# Patient Record
Sex: MALE | Race: WHITE | Employment: OTHER | URBAN - NONMETROPOLITAN AREA
[De-identification: names, ages, dates, MRNs, and addresses within clinical notes are randomized per-mention and may not be internally consistent; named-entity substitution may affect disease eponyms.]

---

## 2018-12-21 ENCOUNTER — HOSPITAL ENCOUNTER (OUTPATIENT)
Dept: CT IMAGING | Age: 63
Discharge: HOME | End: 2018-12-21
Payer: COMMERCIAL

## 2018-12-21 DIAGNOSIS — F17.209 NICOTINE DEPENDENCE WITH NICOTINE-INDUCED DISORDER, UNSPECIFIED NICOTINE PRODUCT TYPE: ICD-10-CM

## 2018-12-21 PROCEDURE — G0297 LDCT FOR LUNG CA SCREEN: HCPCS

## 2019-12-30 ENCOUNTER — HOSPITAL ENCOUNTER (OUTPATIENT)
Dept: CT IMAGING | Age: 64
Discharge: HOME | End: 2019-12-30
Payer: COMMERCIAL

## 2019-12-30 DIAGNOSIS — Z87.891 PERSONAL HISTORY OF TOBACCO USE, PRESENTING HAZARDS TO HEALTH: ICD-10-CM

## 2019-12-30 PROCEDURE — G0297 LDCT FOR LUNG CA SCREEN: HCPCS

## 2020-07-24 ENCOUNTER — HOSPITAL ENCOUNTER (OUTPATIENT)
Dept: AUDIOLOGY | Age: 65
Discharge: HOME | End: 2020-07-24
Payer: COMMERCIAL

## 2020-07-24 PROCEDURE — 92557 COMPREHENSIVE HEARING TEST: CPT | Performed by: AUDIOLOGIST

## 2020-07-24 PROCEDURE — 92567 TYMPANOMETRY: CPT | Performed by: AUDIOLOGIST

## 2020-07-24 NOTE — LETTER
[address] [facility]  [address] [place] [address] [address]  Phone: [address], Au[place]        July 24, 2020     Bekah Pennington, [address]    Patient: Piyush Garcia   MR Number: [id number]   YOB: 1955   Date of Visit: 7/24/2020       Dear Dr. Tete Casas:    Thank you for referring Angelito Flores to me for evaluation. Below are the relevant portions of my assessment and plan of care. AUDIOLOGICAL EVALUATION      REASON FOR TESTING:  The patient stated that he has a known bilateral high frequency hearing loss that was identified with occupational audiology testing. He is noticing increasing hearing difficulties during the last several years. Hearing in groups and background noise is difficult. He denies tinnitus, otalgia and dizziness. OTOSCOPY: Clear canal, bilaterally. AUDIOGRAM    Reliability: Good  Audiometer Used:  [id number]        COMMENTS: Normal hearing sloping to a slight to mild mostly high frequency sensorineural hearing loss, bilaterally. Good word recognition ability in both ears. Tympanometry revealed normal middle ear function in both ears. RECOMMENDATION(S): Bilateral hearing aids are recommended. Annual hearing testing is recommended to monitor hearing levels. The patient stated that he will consider scheduling an appointment for a hearing aid evaluation. If you have questions, please do not hesitate to call me. I look forward to following Samuel Garcia along with you.     Sincerely,          Betsy Stein, AuD

## 2020-07-24 NOTE — PROGRESS NOTES
AUDIOLOGICAL EVALUATION      REASON FOR TESTING:  The patient stated that he has a known bilateral high frequency hearing loss that was identified with occupational audiology testing. He is noticing increasing hearing difficulties during the last several years. Hearing in groups and background noise is difficult. He denies tinnitus, otalgia and dizziness. OTOSCOPY: Clear canal, bilaterally. AUDIOGRAM    Reliability: Good  Audiometer Used:  [id number]        COMMENTS: Normal hearing sloping to a slight to mild mostly high frequency sensorineural hearing loss, bilaterally. Good word recognition ability in both ears. Tympanometry revealed normal middle ear function in both ears. RECOMMENDATION(S): Bilateral hearing aids are recommended. Annual hearing testing is recommended to monitor hearing levels. The patient stated that he will consider scheduling an appointment for a hearing aid evaluation.

## 2020-09-15 ENCOUNTER — HOSPITAL ENCOUNTER (OUTPATIENT)
Dept: AUDIOLOGY | Age: 65
Discharge: HOME | End: 2020-09-15

## 2020-09-15 PROCEDURE — 9990000010 HC NO CHARGE VISIT: Performed by: AUDIOLOGIST

## 2020-09-15 NOTE — PROGRESS NOTES
New Hearing Aid Consultation:  Available hearing aid styles, technologies and options were discussed. The patient does have insurance coverage for hearing aids through [employer]. The patient is interested in pursuing Qwite hearing aids.   The patient's hearing aid fitting is scheduled for 10/14/2020

## 2020-09-25 ENCOUNTER — TELEPHONE (OUTPATIENT)
Dept: AUDIOLOGY | Age: 65
End: 2020-09-25

## 2020-09-25 NOTE — TELEPHONE ENCOUNTER
Patient needs to see an ENT for medical clearance for hearing aids as required by his insurance. Insurance will not cover this visit. I asked the patient to call us and we will help him schedule the appointment if needed. I will fit him with loaner hearing aids on the 14th if he cannot see a physician before than.

## 2020-10-07 ENCOUNTER — OFFICE VISIT (OUTPATIENT)
Dept: ENT CLINIC | Age: 65
End: 2020-10-07
Payer: COMMERCIAL

## 2020-10-07 VITALS
WEIGHT: 205.7 LBS | HEART RATE: 72 BPM | TEMPERATURE: 97.8 F | SYSTOLIC BLOOD PRESSURE: 112 MMHG | HEIGHT: 70 IN | BODY MASS INDEX: 29.45 KG/M2 | RESPIRATION RATE: 12 BRPM | DIASTOLIC BLOOD PRESSURE: 76 MMHG

## 2020-10-07 PROCEDURE — 99202 OFFICE O/P NEW SF 15 MIN: CPT | Performed by: OTOLARYNGOLOGY

## 2020-10-07 RX ORDER — FLUTICASONE PROPIONATE 50 MCG
SPRAY, SUSPENSION (ML) NASAL
COMMUNITY
Start: 2020-09-18

## 2020-10-07 RX ORDER — ALBUTEROL SULFATE 2.5 MG/3ML
2.5 SOLUTION RESPIRATORY (INHALATION) EVERY 6 HOURS PRN
COMMUNITY

## 2020-10-07 RX ORDER — DOXEPIN HYDROCHLORIDE 50 MG/1
50 CAPSULE ORAL NIGHTLY
COMMUNITY
End: 2022-01-10

## 2020-10-07 RX ORDER — NAPROXEN 500 MG/1
TABLET ORAL
COMMUNITY
Start: 2020-09-18 | End: 2020-10-07

## 2020-10-07 NOTE — PROGRESS NOTES
negative, except as noted in HPI. Objective:     /76 (Site: Left Upper Arm, Position: Sitting)   Pulse 72   Temp 97.8 °F (36.6 °C) (Infrared)   Resp 12   Ht 5' 10\" (1.778 m)   Wt 205 lb 11.2 oz (93.3 kg)   BMI 29.51 kg/m²     Physical Exam       On general physical exam the patient is a pleasant alert well oriented and cooperative adult male who appears approximately his stated age. His voice and speech pattern are both within normal limits for his age and gender. I heard no throat clearing coughing or inspiratory stridor. On otoscopy both the ears were within normal limits from his middle ear structures through his tympanic membranes, ear canals, and pinna. His extraocular movements were intact. Smooth pursuit and lateral gaze nystagmus were both within normal limits. Vitals reviewed. No results found. No results found for: NA, K, CL, CO2, BUN, CREATININE, CALCIUM, PROT, LABALBU, BILITOT, ALKPHOS, AST, ALT    All of the past medical history, past surgical history, family history,social history, allergies and current medications were reviewed with the patient. Assessment & Plan   Diagnoses and all orders for this visit:     Diagnosis Orders   1. Bilateral high frequency sensorineural hearing loss           The patient has a symmetrical high-frequency sensorineural hearing loss likely associated with age and loud noise exposure. He has no medical reason contraindicating amplification. He has no signs of retrocochlear pathology evident in his exam or in his audiologic evaluation. He is therefore cleared for hearing aids. I explained all this in detail to the patient to his satisfaction. I encouraged him to consider getting the hearing aids that are adjustable through his smart phone to increase the value of the technology and its utility under many noise environment circumstances. He will consider it. I will see the patient on an as-needed basis in the future.     I spent approximately 20 minutes of face-to-face time with the patient. Return if symptoms worsen or fail to improve; or new problems. **This report has been created using voice recognition software. It may contain minor errors which are inherent in voice recognition technology. **

## 2020-10-14 ENCOUNTER — HOSPITAL ENCOUNTER (OUTPATIENT)
Dept: AUDIOLOGY | Age: 65
Discharge: HOME | End: 2020-10-14
Payer: COMMERCIAL

## 2020-10-14 PROCEDURE — V5160 DISPENSING FEE BINAURAL: HCPCS | Performed by: AUDIOLOGIST

## 2020-10-14 PROCEDURE — V5261 HEARING AID, DIGIT, BIN, BTE: HCPCS | Performed by: AUDIOLOGIST

## 2020-10-14 NOTE — PROGRESS NOTES
[facility]#: [id number]    ACCT#: [de-identified]    DIAGNOSIS: H90.3    NEW HEARING AID FITTING: Dispensed Phonak P70-R JESSIE hearing aid(s) for the right and left ear(s). Explained care, use and insertion/removal.  Programmed. Hearing aids were paired to the patient's phone and TV streamer. AIDED TESTING:  Real ear to  measures were obtained during today's appointment. The [employer] Real Ear system was used to perform the RECD measures. The hearing aid was reprogrammed to match appropriate DSL targets for MPO, soft, medium and loud stimuli with speech mapping based on 07/24/2020 audiological data. The measures were as follows. Aided responses suggest: good audibility from appropriately fit amplification   Hearing aid fitting  recheck scheduled for 11/04/2020.

## 2020-10-22 ENCOUNTER — HOSPITAL ENCOUNTER (INPATIENT)
Age: 65
LOS: 1 days | Discharge: HOME | DRG: 247 | End: 2020-10-23
Attending: EMERGENCY MEDICINE | Admitting: INTERNAL MEDICINE
Payer: COMMERCIAL

## 2020-10-22 ENCOUNTER — APPOINTMENT (OUTPATIENT)
Dept: CARDIAC CATH/INVASIVE PROCEDURES | Age: 65
DRG: 247 | End: 2020-10-22
Payer: COMMERCIAL

## 2020-10-22 PROBLEM — I21.3 STEMI (ST ELEVATION MYOCARDIAL INFARCTION) (HCC): Status: ACTIVE | Noted: 2020-10-22

## 2020-10-22 LAB
ACTIVATED CLOTTING TIME: 241 SECONDS (ref 1–150)
ANION GAP SERPL CALCULATED.3IONS-SCNC: 14 MEQ/L (ref 8–16)
BASOPHILS # BLD: 0.3 %
BASOPHILS ABSOLUTE: 0 THOU/MM3 (ref 0–0.1)
BUN BLDV-MCNC: 21 MG/DL (ref 7–22)
CALCIUM SERPL-MCNC: 10.6 MG/DL (ref 8.5–10.5)
CHLORIDE BLD-SCNC: 105 MEQ/L (ref 98–111)
CO2: 24 MEQ/L (ref 23–33)
CREAT SERPL-MCNC: 1 MG/DL (ref 0.4–1.2)
EKG ATRIAL RATE: 62 BPM
EKG P AXIS: 64 DEGREES
EKG P-R INTERVAL: 142 MS
EKG Q-T INTERVAL: 394 MS
EKG QRS DURATION: 92 MS
EKG QTC CALCULATION (BAZETT): 399 MS
EKG R AXIS: 78 DEGREES
EKG T AXIS: 79 DEGREES
EKG VENTRICULAR RATE: 62 BPM
EOSINOPHIL # BLD: 1 %
EOSINOPHILS ABSOLUTE: 0.1 THOU/MM3 (ref 0–0.4)
ERYTHROCYTE [DISTWIDTH] IN BLOOD BY AUTOMATED COUNT: 13.4 % (ref 11.5–14.5)
ERYTHROCYTE [DISTWIDTH] IN BLOOD BY AUTOMATED COUNT: 48 FL (ref 35–45)
GFR SERPL CREATININE-BSD FRML MDRD: 75 ML/MIN/1.73M2
GLUCOSE BLD-MCNC: 119 MG/DL (ref 70–108)
HCT VFR BLD CALC: 45.7 % (ref 42–52)
HEMOGLOBIN: 15.7 GM/DL (ref 14–18)
IMMATURE GRANS (ABS): 0.03 THOU/MM3 (ref 0–0.07)
IMMATURE GRANULOCYTES: 0.3 %
LYMPHOCYTES # BLD: 35 %
LYMPHOCYTES ABSOLUTE: 3.6 THOU/MM3 (ref 1–4.8)
MCH RBC QN AUTO: 33.3 PG (ref 26–33)
MCHC RBC AUTO-ENTMCNC: 34.4 GM/DL (ref 32.2–35.5)
MCV RBC AUTO: 97 FL (ref 80–94)
MONOCYTES # BLD: 7.8 %
MONOCYTES ABSOLUTE: 0.8 THOU/MM3 (ref 0.4–1.3)
MRSA SCREEN RT-PCR: NEGATIVE
NUCLEATED RED BLOOD CELLS: 0 /100 WBC
OSMOLALITY CALCULATION: 289.1 MOSMOL/KG (ref 275–300)
PLATELET # BLD: 210 THOU/MM3 (ref 130–400)
PMV BLD AUTO: 10.1 FL (ref 9.4–12.4)
POTASSIUM REFLEX MAGNESIUM: 3.9 MEQ/L (ref 3.5–5.2)
PRO-BNP: 52.3 PG/ML (ref 0–900)
RBC # BLD: 4.71 MILL/MM3 (ref 4.7–6.1)
SEG NEUTROPHILS: 55.6 %
SEGMENTED NEUTROPHILS ABSOLUTE COUNT: 5.7 THOU/MM3 (ref 1.8–7.7)
SODIUM BLD-SCNC: 143 MEQ/L (ref 135–145)
TROPONIN T: < 0.01 NG/ML
VANCOMYCIN RESISTANT ENTEROCOCCUS: NEGATIVE
WBC # BLD: 10.3 THOU/MM3 (ref 4.8–10.8)

## 2020-10-22 PROCEDURE — B2111ZZ FLUOROSCOPY OF MULTIPLE CORONARY ARTERIES USING LOW OSMOLAR CONTRAST: ICD-10-PCS | Performed by: INTERNAL MEDICINE

## 2020-10-22 PROCEDURE — 87500 VANOMYCIN DNA AMP PROBE: CPT

## 2020-10-22 PROCEDURE — 6360000002 HC RX W HCPCS

## 2020-10-22 PROCEDURE — 93458 L HRT ARTERY/VENTRICLE ANGIO: CPT | Performed by: INTERNAL MEDICINE

## 2020-10-22 PROCEDURE — 85347 COAGULATION TIME ACTIVATED: CPT

## 2020-10-22 PROCEDURE — 6360000004 HC RX CONTRAST MEDICATION: Performed by: INTERNAL MEDICINE

## 2020-10-22 PROCEDURE — 92941 PRQ TRLML REVSC TOT OCCL AMI: CPT | Performed by: INTERNAL MEDICINE

## 2020-10-22 PROCEDURE — 36415 COLL VENOUS BLD VENIPUNCTURE: CPT

## 2020-10-22 PROCEDURE — 96374 THER/PROPH/DIAG INJ IV PUSH: CPT

## 2020-10-22 PROCEDURE — 6360000002 HC RX W HCPCS: Performed by: INTERNAL MEDICINE

## 2020-10-22 PROCEDURE — 87641 MR-STAPH DNA AMP PROBE: CPT

## 2020-10-22 PROCEDURE — C1769 GUIDE WIRE: HCPCS

## 2020-10-22 PROCEDURE — 93005 ELECTROCARDIOGRAM TRACING: CPT | Performed by: EMERGENCY MEDICINE

## 2020-10-22 PROCEDURE — 83880 ASSAY OF NATRIURETIC PEPTIDE: CPT

## 2020-10-22 PROCEDURE — 2709999900 HC NON-CHARGEABLE SUPPLY

## 2020-10-22 PROCEDURE — 99284 EMERGENCY DEPT VISIT MOD MDM: CPT

## 2020-10-22 PROCEDURE — 93010 ELECTROCARDIOGRAM REPORT: CPT | Performed by: NUCLEAR MEDICINE

## 2020-10-22 PROCEDURE — C1887 CATHETER, GUIDING: HCPCS

## 2020-10-22 PROCEDURE — 2580000003 HC RX 258: Performed by: INTERNAL MEDICINE

## 2020-10-22 PROCEDURE — C1894 INTRO/SHEATH, NON-LASER: HCPCS

## 2020-10-22 PROCEDURE — 2000000000 HC ICU R&B

## 2020-10-22 PROCEDURE — 4A023N7 MEASUREMENT OF CARDIAC SAMPLING AND PRESSURE, LEFT HEART, PERCUTANEOUS APPROACH: ICD-10-PCS | Performed by: INTERNAL MEDICINE

## 2020-10-22 PROCEDURE — B2151ZZ FLUOROSCOPY OF LEFT HEART USING LOW OSMOLAR CONTRAST: ICD-10-PCS | Performed by: INTERNAL MEDICINE

## 2020-10-22 PROCEDURE — C1874 STENT, COATED/COV W/DEL SYS: HCPCS

## 2020-10-22 PROCEDURE — 027034Z DILATION OF CORONARY ARTERY, ONE ARTERY WITH DRUG-ELUTING INTRALUMINAL DEVICE, PERCUTANEOUS APPROACH: ICD-10-PCS | Performed by: INTERNAL MEDICINE

## 2020-10-22 PROCEDURE — 6370000000 HC RX 637 (ALT 250 FOR IP)

## 2020-10-22 PROCEDURE — C1725 CATH, TRANSLUMIN NON-LASER: HCPCS

## 2020-10-22 PROCEDURE — 99223 1ST HOSP IP/OBS HIGH 75: CPT | Performed by: INTERNAL MEDICINE

## 2020-10-22 PROCEDURE — 85025 COMPLETE CBC W/AUTO DIFF WBC: CPT

## 2020-10-22 PROCEDURE — 87147 CULTURE TYPE IMMUNOLOGIC: CPT

## 2020-10-22 PROCEDURE — 87081 CULTURE SCREEN ONLY: CPT

## 2020-10-22 PROCEDURE — 6370000000 HC RX 637 (ALT 250 FOR IP): Performed by: INTERNAL MEDICINE

## 2020-10-22 PROCEDURE — 80048 BASIC METABOLIC PNL TOTAL CA: CPT

## 2020-10-22 PROCEDURE — 2500000003 HC RX 250 WO HCPCS

## 2020-10-22 PROCEDURE — 84484 ASSAY OF TROPONIN QUANT: CPT

## 2020-10-22 RX ORDER — HEPARIN SODIUM 1000 [USP'U]/ML
5000 INJECTION, SOLUTION INTRAVENOUS; SUBCUTANEOUS ONCE
Status: COMPLETED | OUTPATIENT
Start: 2020-10-22 | End: 2020-10-22

## 2020-10-22 RX ORDER — ASPIRIN 81 MG/1
81 TABLET ORAL DAILY
Status: DISCONTINUED | OUTPATIENT
Start: 2020-10-23 | End: 2020-10-23 | Stop reason: SDUPTHER

## 2020-10-22 RX ORDER — CARVEDILOL 3.12 MG/1
3.12 TABLET ORAL 2 TIMES DAILY WITH MEALS
Status: DISCONTINUED | OUTPATIENT
Start: 2020-10-22 | End: 2020-10-23 | Stop reason: HOSPADM

## 2020-10-22 RX ORDER — ONDANSETRON 2 MG/ML
4 INJECTION INTRAMUSCULAR; INTRAVENOUS EVERY 6 HOURS PRN
Status: DISCONTINUED | OUTPATIENT
Start: 2020-10-22 | End: 2020-10-23 | Stop reason: HOSPADM

## 2020-10-22 RX ORDER — ACETAMINOPHEN 325 MG/1
650 TABLET ORAL EVERY 4 HOURS PRN
Status: DISCONTINUED | OUTPATIENT
Start: 2020-10-22 | End: 2020-10-23 | Stop reason: HOSPADM

## 2020-10-22 RX ORDER — SODIUM CHLORIDE 0.9 % (FLUSH) 0.9 %
10 SYRINGE (ML) INJECTION EVERY 12 HOURS SCHEDULED
Status: DISCONTINUED | OUTPATIENT
Start: 2020-10-22 | End: 2020-10-23 | Stop reason: HOSPADM

## 2020-10-22 RX ORDER — ATORVASTATIN CALCIUM 80 MG/1
80 TABLET, FILM COATED ORAL NIGHTLY
Status: DISCONTINUED | OUTPATIENT
Start: 2020-10-22 | End: 2020-10-23 | Stop reason: HOSPADM

## 2020-10-22 RX ORDER — SODIUM CHLORIDE 0.9 % (FLUSH) 0.9 %
10 SYRINGE (ML) INJECTION PRN
Status: DISCONTINUED | OUTPATIENT
Start: 2020-10-22 | End: 2020-10-23 | Stop reason: HOSPADM

## 2020-10-22 RX ADMIN — HEPARIN SODIUM 5000 UNITS: 1000 INJECTION INTRAVENOUS; SUBCUTANEOUS at 15:08

## 2020-10-22 RX ADMIN — Medication 10 ML: at 20:17

## 2020-10-22 RX ADMIN — TICAGRELOR 90 MG: 90 TABLET ORAL at 20:15

## 2020-10-22 RX ADMIN — IOPAMIDOL 90 ML: 755 INJECTION, SOLUTION INTRAVENOUS at 15:57

## 2020-10-22 RX ADMIN — CARVEDILOL 3.12 MG: 3.12 TABLET, FILM COATED ORAL at 20:13

## 2020-10-22 ASSESSMENT — PAIN DESCRIPTION - PAIN TYPE: TYPE: ACUTE PAIN

## 2020-10-22 ASSESSMENT — ENCOUNTER SYMPTOMS
COLOR CHANGE: 0
PHOTOPHOBIA: 0
NAUSEA: 0
CHEST TIGHTNESS: 0
VOMITING: 0
ABDOMINAL PAIN: 0
BACK PAIN: 0
COUGH: 0
EYE REDNESS: 0
SINUS PRESSURE: 0
SORE THROAT: 0

## 2020-10-22 ASSESSMENT — PAIN DESCRIPTION - DESCRIPTORS: DESCRIPTORS: SHARP;SHOOTING

## 2020-10-22 ASSESSMENT — PAIN SCALES - GENERAL
PAINLEVEL_OUTOF10: 0
PAINLEVEL_OUTOF10: 8

## 2020-10-22 ASSESSMENT — PAIN DESCRIPTION - ORIENTATION: ORIENTATION: MID

## 2020-10-22 ASSESSMENT — PAIN DESCRIPTION - LOCATION: LOCATION: CHEST

## 2020-10-22 NOTE — ED PROVIDER NOTES
[facility] ENCOUNTER          Pt Name: Rajan Calix  MRN: [id number]  [place] 1955  Date of evaluation: 10/22/2020  Emergency Physician: Amaury Bolden, [facility]       Chief Complaint   Patient presents with    Chest Pain     History obtained from the patient. HISTORY OF PRESENT ILLNESS    HPI  Rajan Calix is a 63 y.o. male who presents to the emergency department for evaluation of chest pain. Patient with a past medical history of tobacco use. Denies history of hyperlipidemia hypertension or diabetes. No family history of coronary artery disease. No history of aneurysms or strokes. States chest pain started when he was pulling weeds outside today. Pain is midsternal.  It does not radiate  The patient has no other acute complaints at this time. REVIEW OF SYSTEMS   Review of Systems   Constitutional: Negative for activity change, chills and fever. HENT: Negative for congestion, sinus pressure and sore throat. Eyes: Negative for photophobia, redness and visual disturbance. Respiratory: Negative for cough and chest tightness. Cardiovascular: Positive for chest pain. Negative for palpitations and leg swelling. Gastrointestinal: Negative for abdominal pain, nausea and vomiting. Endocrine: Negative for polydipsia and polyuria. Genitourinary: Negative for decreased urine volume, difficulty urinating, dysuria, flank pain, frequency and urgency. Musculoskeletal: Negative for back pain, myalgias and neck pain. Skin: Negative for color change and rash. Neurological: Negative for weakness and headaches. Hematological: Negative for adenopathy. Does not bruise/bleed easily. Psychiatric/Behavioral: Negative for confusion and self-injury.          PAST MEDICAL AND SURGICAL HISTORY     Past Medical History:   Diagnosis Date    Hyperlipidemia      Past Surgical History:   Procedure Laterality Date  BACK SURGERY           MEDICATIONS   No current facility-administered medications for this encounter. Current Outpatient Medications:     albuterol (PROVENTIL) (2.5 MG/3ML) 0.083% nebulizer solution, Take 2.5 mg by nebulization every 6 hours as needed for Wheezing, Disp: , Rfl:     doxepin (SINEQUAN) 50 MG capsule, Take 50 mg by mouth nightly, Disp: , Rfl:     fluticasone (FLONASE) 50 MCG/ACT nasal spray, , Disp: , Rfl:     PROAIR  (90 Base) MCG/ACT inhaler, , Disp: , Rfl:     ADVAIR DISKUS 250-50 MCG/DOSE AEPB, , Disp: , Rfl:       SOCIAL HISTORY     Social History     Social History Narrative    Not on file     Social History     Tobacco Use    Smoking status: Passive Smoke Exposure - Never Smoker    Smokeless tobacco: Never Used   Substance Use Topics    Alcohol use: No    Drug use: Yes     Types: Marijuana     Comment: last used cannabis on 3/15/13         ALLERGIES   No Known Allergies      FAMILY HISTORY     Family History   Problem Relation Age of Onset    Heart Disease Maternal Grandmother     Heart Disease Maternal Grandfather     Cancer Maternal Grandfather     Heart Disease Paternal Grandmother     Substance Abuse Maternal Uncle          PHYSICAL EXAM     ED Triage Vitals [10/22/20 1507]   BP Temp Temp Source Pulse Resp SpO2 Height Weight   118/77 97.9 °F (36.6 °C) Oral 62 20 100 % -- 205 lb (93 kg)         Additional Vital Signs:  Vitals:    10/22/20 1507   BP: 118/77   Pulse: 62   Resp: 20   Temp: 97.9 °F (36.6 °C)   SpO2: 100%       Physical Exam  Vitals signs and nursing note reviewed. Constitutional:       General: He is in acute distress. Appearance: He is well-developed. He is not diaphoretic. HENT:      Head: Normocephalic. Eyes:      Pupils: Pupils are equal, round, and reactive to light. Neck:      Musculoskeletal: Normal range of motion and neck supple. Vascular: No JVD. Cardiovascular:      Rate and Rhythm: Normal rate and regular rhythm. Pulses: Normal pulses. Heart sounds: Normal heart sounds. No murmur. Comments: Equal radial pulses bilateral  Pulmonary:      Effort: Pulmonary effort is normal.      Breath sounds: Normal breath sounds. Abdominal:      General: Bowel sounds are normal. There is no distension. Palpations: Abdomen is soft. Tenderness: There is no abdominal tenderness. Musculoskeletal: Normal range of motion. General: No tenderness or deformity. Skin:     General: Skin is warm and dry. Capillary Refill: Capillary refill takes less than 2 seconds. Neurological:      Mental Status: He is alert and oriented to person, place, and time. GCS: GCS eye subscore is 4. GCS verbal subscore is 5. GCS motor subscore is 6. Cranial Nerves: No cranial nerve deficit. Sensory: No sensory deficit. Motor: No abnormal muscle tone. Initial vital signs and nursing assessment reviewed and normal. Pulsoximetry is normal per my interpretation. MEDICAL DECISION MAKING   Initial Assessment: Patient presented for evaluation of chest pain. The vitals signs and physical exam were notable for patient appears in discomfort. He has equal pulses bilateral.  No murmurs  Given these findings the emergent condition that needed addressed/further defined were ECG shows ST elevation MI. Given the patient ECG shows ST elevation MI, unlikely dissection, pulmonary embolus given history  Plan: To further define disposition and risk stratification will obtain CBC, BMP, ECG, troponin, activate Cath Lab.         ED RESULTS   Laboratory results:  Labs Reviewed   BASIC METABOLIC PANEL W/ REFLEX TO MG FOR LOW K   BRAIN NATRIURETIC PEPTIDE   CBC WITH AUTO DIFFERENTIAL   TROPONIN       Radiologic studies results:  No orders to display       ED Medications administered this visit:   Medications   heparin (porcine) injection 5,000 Units (5,000 Units Intravenous Given 10/22/20 5774)         ED COURSE      Dr. Yaneli Chowdhury

## 2020-10-22 NOTE — PLAN OF CARE
Problem: Discharge Planning:  Goal: Discharged to appropriate level of care  Description: Discharged to appropriate level of care  Outcome: Ongoing  Note: Discharge planning in process and discussed with patient/family. Social work consulted for any additional needs. Care manager aware of discharge needs. Problem: Pain - Acute:  Goal: Pain level will decrease  Description: Pain level will decrease  Outcome: Ongoing     Problem: Fluid Volume - Imbalance:  Goal: Will show no signs and symptoms of excessive bleeding  Description: Will show no signs and symptoms of excessive bleeding  Outcome: Ongoing  Note: No signs of excessive bleeding observed. Vascband remains in place, 4ml remain at the end of this shift. Continue to monitor for signs of bleeding and for fluid volume imbalance. Problem: Fluid Volume - Imbalance:  Goal: Absence of imbalanced fluid volume signs and symptoms  Description: Absence of imbalanced fluid volume signs and symptoms  Outcome: Ongoing     Problem: Anxiety:  Goal: Level of anxiety will decrease  Description: Level of anxiety will decrease  Outcome: Ongoing  Note: No anxiety observed. Continue to monitor. Problem: Cardiac Output - Decreased:  Goal: Cardiac output within specified parameters  Description: Cardiac output within specified parameters  Outcome: Ongoing  Note: CO adequate at this time. BP adequate, UOP adequate. Continue to monitor. Problem: Serum Glucose Level - Abnormal:  Goal: Ability to maintain appropriate glucose levels will improve  Description: Ability to maintain appropriate glucose levels will improve  Outcome: Ongoing  Note: Glucose levels within normal limits at bedside. Problem: Tissue Perfusion - Cardiopulmonary, Altered:  Goal: Absence of angina  Description: Absence of angina  Outcome: Ongoing  Note: No chest pain for this shift.       Problem: Tissue Perfusion - Cardiopulmonary, Altered:  Goal: Hemodynamic stability will improve  Description: Hemodynamic stability will improve  Outcome: Ongoing  Note: Remains hemodynamically stable for this shift. Problem: Tissue Perfusion - Peripheral, Altered:  Goal: Absence of hematoma at arterial access site  Description: Absence of hematoma at arterial access site  Outcome: Ongoing  Note: No hematoma at access site. Vascband remains in place. Removing air per protocol. Problem: Tissue Perfusion - Renal, Altered:  Goal: Electrolytes within specified parameters  Description: Electrolytes within specified parameters  Outcome: Ongoing  Note: No issues with electrolytes for this shift. Care plan reviewed with patient. Patient verbalizes understanding of the plan of care and contribute to goal setting.

## 2020-10-22 NOTE — H&P
[facility]'s  Cardiology Consult        Patient:  April Banegas  YOB: 1955  MRN: [id number]     Acct: [de-identified]    PCP: Beth Calles MD    Date of Admission: 10/22/2020      Reason for Consultation:  Chest pain/STEMI      History Of Present Illness:    63 y.o. pleasant male c hx of HLD who presented to the hospital with complaints of chest pain. The chest pain started about 2 hours prior to the arrival.  The pain was substernal, pressure like, associated with 10/10 pain, nonradiating, associated shortness of breath and diaphoresis, no aggravating or alleviating factors, no previous similar episodes. No prior cardiac work up. EKG in the field and in ED showed ST elevations in the inferior leads and ST depression in the anterior leads. Past Medical History:          Diagnosis Date    Hyperlipidemia        Past Surgical History:          Procedure Laterality Date    BACK SURGERY         Medications Prior to Admission:      Prior to Admission medications    Medication Sig Start Date End Date Taking? Authorizing Provider   albuterol (PROVENTIL) (2.5 MG/3ML) 0.083% nebulizer solution Take 2.5 mg by nebulization every 6 hours as needed for Wheezing    Historical Provider, MD   doxepin (SINEQUAN) 50 MG capsule Take 50 mg by mouth nightly    Historical Provider, MD   fluticasone [facility]) 50 MCG/ACT nasal spray  9/18/20   Historical Provider, MD   PROAIR  (30 Base) MCG/ACT inhaler  7/20/20   Historical Provider, MD Rosana Jones 250-50 MCG/DOSE AEPB  9/25/20   Historical Provider, MD       No current facility-administered medications for this encounter.       Current Outpatient Medications   Medication Sig Dispense Refill    albuterol (PROVENTIL) (2.5 MG/3ML) 0.083% nebulizer solution Take 2.5 mg by nebulization every 6 hours as needed for Wheezing      doxepin (SINEQUAN) 50 MG capsule Take 50 mg by mouth nightly      fluticasone (FLONASE) 50 MCG/ACT rashes, no suspicious skin lesions noted      LABS:    No results for input(s): CKTOTAL, CKMB, CKMBINDEX, TROPONINT in the last 72 hours. CBC: No results found for: WBC, RBC, HGB, HCT, MCV, MCH, MCHC, RDW, PLT, MPV  BMP:  No results found for: NA, K, CL, CO2, BUN, LABALBU, CREATININE, CALCIUM, GFRAA, LABGLOM, GLUCOSE  Hepatic Function Panel:  No results found for: ALKPHOS, ALT, AST, PROT, BILITOT, BILIDIR, IBILI, LABALBU  Magnesium:  No results found for: MG  Warfarin PT/INR:  No components found for: PTPATWAR, PTINRWAR  HgBA1c:  No results found for: LABA1C  FLP:  No results found for: TRIG, HDL, LDLCALC, LDLDIRECT, LABVLDL  TSH:  No results found for: TSH  BNP: No components found for: PRO-BNP      Assessment/Plan:    Patient Active Problem List   Diagnosis    Marijuana dependence ([facility] [address]     Chest pain/STEMI  HLD  Family hx of CAD    IV heparin  Emergent cath to evaluate coronaries  Aspirin  P2Y12 inhibitor  High intensity statin  Transfer to CCU/ICU post procedure  2D Echo  IV fluids  The indication, risks and benefits of the procedure and possible therapeutic consequences and alternatives were discussed with the patient. The patient was given the opportunity to ask questions and to have them answered to his/her satisfaction. Risks of the procedure include but are not limited to the following: Bleeding, hematoma including retroperitoneal hematoma, infection, pain and discomfort, injury to the aorta and other blood vessels, rhythm disturbance, low blood pressure, myocardial infarction, stroke, kidney damage/failure, myocardial perforation, allergic reactions to sedatives/contrast material, loss of pulse/vascular compromise requiring surgery, aneurysm/pseudoaneurysm formation, possible loss of a limb/hand/leg, death. Alternatives to and omission of the suggested procedure were discussed. The patient had no further questions and wished to proceed; the consent form was signed.   Discussed with daughter who was

## 2020-10-22 NOTE — PROCEDURES
[address] [place] [address]                            CARDIAC CATHETERIZATION    PATIENT NAME: Elizabeth Torre               :        1955  MED REC NO:   [id number]                           ROOM:       0016  ACCOUNT NO:   [de-identified]                           ADMIT DATE: 10/22/2020  PROVIDER:     Rick Campos MD    DATE OF PROCEDURE:  10/22/2020    PROCEDURES PERFORMED:  Left heart catheterization, LV gram, PCI of left  circumflex. INDICATION FOR STUDY:  STEMI. DESCRIPTION OF PROCEDURE:  After informed consent was obtained, the  patient was brought to the cardiac catheterization laboratory emergently  due to ongoing chest pain and EKG showing ST-segment elevation in the  inferolateral leads. Preprocedure timeout was performed and 2%  lidocaine was used to anesthetize the subcutaneous tissue overlying the  right radial artery. Using a modified Seldinger technique, a 6-Mosotho  Slender arterial sheath was placed in the right radial artery. Once the  sheath was in place, radial cocktail was given to reduce radial artery  spasm/occlusion. EQUIPMENTS USED:  Standard 6-Mosotho EBU 3.5 guide catheter, JL4 guide  catheter, pigtail catheter. ESTIMATED BLOOD LOSS:  Less than 20 mL. MEDICATIONS:  Please see EMR. CORONARY ANATOMY:  1. Right coronary artery appears to be a codominant system. There are  mild luminal irregularities in the proximal, mid, and distal portions of  the vessel. Distally, there is a moderate-size PDA and PL branches,  both of which are patent. 2.  Left main is patent and gives rise to LAD and left circumflex  arteries. 3.  Left circumflex artery is patent in the proximal portion beyond the  OM1 vessel. There is total occlusion thrombotic of the left circumflex  artery. 4.   LAD has a mildly calcified proximal to mid stenosis of about 70% to  80% followed by mild luminal irregularities at the distal portion of the  LAD. Based on angiographic findings, we decided to pursue percutaneous  coronary intervention of the left circumflex artery. IV heparin was  given. ACT was maintained above 250 seconds. Due to the extensive  nature of the clot, IV Aggrastat bolus was also given. Coronary  Runthrough wire was used to cross the critical stenosis in the left  circumflex artery. Tip of the wire was placed in the distal OM vessel. We first predilated the lesion with a 2.5 x 12 mm compliant balloon. After adequate predilatation, we then placed a 3.5 x 23 mm Xience Asmita  drug-eluting stent from normal-to-normal segment fashion. Once the  stent was deployed, it was postdilated at high pressures with a 4.0 x 20  mm noncompliant balloon at high pressures to ensure good stent  apposition. Repeat angiogram showed good CRISTOBAL-3 flow throughout the entire main  vessel as well as the side branches. There was no dissection,  perforation, distal embolizations, or side branch closures anywhere. There was good postdilatation of the stent. LVEDP was measured to be 11 mmHg. There is no obvious gradient across  the aortic valve and LV systolic function was estimated at 40% to 45%  with inferior apical hypokinesis. Hemostasis of the right radial artery was achieved with a Vasc Band  device. He was transferred to ICU for further monitoring. SUMMARY:  Successful PCI of totally occluded thrombotic left circumflex  artery with Xience Asmita drug-eluting stent. RECOMMENDATIONS:  1. DAPT with aspirin plus Brilinta for one year. 2.  Lipid-lowering therapy. 3.  Get 2D echocardiogram.  4.  IV fluids. 5.  Monitor radial artery access site for bleeding. 6.  Monitor for arrhythmias. 7.  Needs staged intervention of the proximal to mid LAD in a.m. All procedure details and management plans were discussed with the  patient and family members. They were in agreement with plan.         [facility]

## 2020-10-23 ENCOUNTER — TELEPHONE (OUTPATIENT)
Dept: CARDIOLOGY CLINIC | Age: 65
End: 2020-10-23

## 2020-10-23 ENCOUNTER — APPOINTMENT (OUTPATIENT)
Dept: CARDIAC CATH/INVASIVE PROCEDURES | Age: 65
DRG: 247 | End: 2020-10-23
Payer: COMMERCIAL

## 2020-10-23 VITALS
BODY MASS INDEX: 28.41 KG/M2 | SYSTOLIC BLOOD PRESSURE: 126 MMHG | WEIGHT: 197.97 LBS | RESPIRATION RATE: 18 BRPM | HEART RATE: 62 BPM | OXYGEN SATURATION: 95 % | DIASTOLIC BLOOD PRESSURE: 79 MMHG | TEMPERATURE: 98 F

## 2020-10-23 LAB
ACTIVATED CLOTTING TIME: 285 SECONDS (ref 1–150)
ACTIVATED CLOTTING TIME: 450 SECONDS (ref 1–150)
CHOLESTEROL, TOTAL: 200 MG/DL (ref 100–199)
HDLC SERPL-MCNC: 38 MG/DL
LDL CHOLESTEROL CALCULATED: 125 MG/DL
LV EF: 43 %
LVEF MODALITY: NORMAL
TRIGL SERPL-MCNC: 184 MG/DL (ref 0–199)

## 2020-10-23 PROCEDURE — 85347 COAGULATION TIME ACTIVATED: CPT

## 2020-10-23 PROCEDURE — C1769 GUIDE WIRE: HCPCS

## 2020-10-23 PROCEDURE — 6360000004 HC RX CONTRAST MEDICATION: Performed by: INTERNAL MEDICINE

## 2020-10-23 PROCEDURE — 99238 HOSP IP/OBS DSCHRG MGMT 30/<: CPT | Performed by: NURSE PRACTITIONER

## 2020-10-23 PROCEDURE — 6370000000 HC RX 637 (ALT 250 FOR IP): Performed by: INTERNAL MEDICINE

## 2020-10-23 PROCEDURE — 2500000003 HC RX 250 WO HCPCS

## 2020-10-23 PROCEDURE — 6370000000 HC RX 637 (ALT 250 FOR IP): Performed by: NURSE PRACTITIONER

## 2020-10-23 PROCEDURE — 80061 LIPID PANEL: CPT

## 2020-10-23 PROCEDURE — C1887 CATHETER, GUIDING: HCPCS

## 2020-10-23 PROCEDURE — 36415 COLL VENOUS BLD VENIPUNCTURE: CPT

## 2020-10-23 PROCEDURE — 92928 PRQ TCAT PLMT NTRAC ST 1 LES: CPT | Performed by: INTERNAL MEDICINE

## 2020-10-23 PROCEDURE — 93306 TTE W/DOPPLER COMPLETE: CPT

## 2020-10-23 PROCEDURE — C1874 STENT, COATED/COV W/DEL SYS: HCPCS

## 2020-10-23 PROCEDURE — 2709999900 HC NON-CHARGEABLE SUPPLY

## 2020-10-23 PROCEDURE — C1725 CATH, TRANSLUMIN NON-LASER: HCPCS

## 2020-10-23 PROCEDURE — 6360000002 HC RX W HCPCS

## 2020-10-23 PROCEDURE — C1894 INTRO/SHEATH, NON-LASER: HCPCS

## 2020-10-23 RX ORDER — ASPIRIN 81 MG/1
81 TABLET ORAL DAILY
Qty: 30 TABLET | Refills: 3 | Status: SHIPPED | OUTPATIENT
Start: 2020-10-24 | End: 2021-02-25

## 2020-10-23 RX ORDER — LISINOPRIL 5 MG/1
5 TABLET ORAL DAILY
Qty: 30 TABLET | Refills: 3 | Status: SHIPPED | OUTPATIENT
Start: 2020-10-24 | End: 2021-02-25

## 2020-10-23 RX ORDER — ASPIRIN 81 MG/1
81 TABLET ORAL DAILY
Status: DISCONTINUED | OUTPATIENT
Start: 2020-10-23 | End: 2020-10-23 | Stop reason: HOSPADM

## 2020-10-23 RX ORDER — ATORVASTATIN CALCIUM 80 MG/1
80 TABLET, FILM COATED ORAL NIGHTLY
Qty: 30 TABLET | Refills: 3 | Status: SHIPPED | OUTPATIENT
Start: 2020-10-23 | End: 2021-02-25

## 2020-10-23 RX ORDER — CARVEDILOL 3.12 MG/1
3.12 TABLET ORAL 2 TIMES DAILY WITH MEALS
Qty: 60 TABLET | Refills: 3 | Status: SHIPPED | OUTPATIENT
Start: 2020-10-23 | End: 2021-02-25

## 2020-10-23 RX ORDER — NITROGLYCERIN 0.4 MG/1
TABLET SUBLINGUAL
Qty: 25 TABLET | Refills: 1 | Status: SHIPPED | OUTPATIENT
Start: 2020-10-23

## 2020-10-23 RX ORDER — LISINOPRIL 5 MG/1
5 TABLET ORAL DAILY
Status: DISCONTINUED | OUTPATIENT
Start: 2020-10-23 | End: 2020-10-23 | Stop reason: HOSPADM

## 2020-10-23 RX ADMIN — CARVEDILOL 3.12 MG: 3.12 TABLET, FILM COATED ORAL at 10:29

## 2020-10-23 RX ADMIN — ATORVASTATIN CALCIUM 80 MG: 80 TABLET, FILM COATED ORAL at 00:21

## 2020-10-23 RX ADMIN — LISINOPRIL 5 MG: 5 TABLET ORAL at 13:09

## 2020-10-23 RX ADMIN — IOPAMIDOL 60 ML: 755 INJECTION, SOLUTION INTRAVENOUS at 07:41

## 2020-10-23 RX ADMIN — ASPIRIN 81 MG: 81 TABLET ORAL at 06:25

## 2020-10-23 RX ADMIN — TICAGRELOR 90 MG: 90 TABLET ORAL at 06:25

## 2020-10-23 ASSESSMENT — PAIN SCALES - GENERAL
PAINLEVEL_OUTOF10: 0
PAINLEVEL_OUTOF10: 0

## 2020-10-23 NOTE — FLOWSHEET NOTE
[facility] PROGRESS NOTE      Patient: Kira Hampton  Room #: 4F-20/346-N            YOB: 1955  Age: 63 y.o. Gender: male            Admit Date & Time: 10/22/2020  3:02 PM    Assessment:  Nilda Elder is a 63year old male who is in bed on 3B. His wife was a former nurse and once she saw me in the hallway she told me of her 's condition. Nilda Elder had his children in the room with him. He is in good spirits but wants to go home soon  Interventions:  Prayer with his family    Outcomes:  encouraged    Plan: 1. Care Plan:  Continue spiritual and emotional care for patient and family. Including prayers.      Electronically signed by Odalys Suh on 10/23/2020 at 2:20 PM.  Messi Gao  [phone]

## 2020-10-23 NOTE — CARE COORDINATION
10/23/20, 9:55 AM EDT  DISCHARGE PLANNING EVALUATION:    Kira Hampton       Admitted from: ED 10/22/2020/ [address] day: 1   Location: [address] [place] [address] Reason for admit: STEMI (ST elevation myocardial infarction) ([facility] [address] [I21.3] Status: IP  Admit order signed?: no  PMH:  has a past medical history of Hyperlipidemia. Procedure:   10/22 Cardiac Cath with PCI of totally occluded thrombotic LCX with TYRA  10/23 Cardiac Cath with PCI to LAD  Pertinent abnormal Imaging:none  Medications:  Scheduled Meds:   aspirin  81 mg Oral Daily    sodium chloride flush  10 mL Intravenous 2 times per day    carvedilol  3.125 mg Oral BID WC    atorvastatin  80 mg Oral Nightly    ticagrelor  90 mg Oral BID     Continuous Infusions:   Pertinent Info/Orders/Treatment Plan: Presented with c/o chest pain with SOB and diaphoresis that began 2 hours prior to presentation. STEMI and taken to cath lab with stents to LCX. Returned to cath lab today for staged PCI to LAD. Echo ordered. Afebrile. NSR. On room air. Ox4. Puncture site WNL. Cardiac Rehab and Dietitian consulted. Telemetry, I&O, daily weight, n/v checks, wound care. Asa, lipitor, coreg, lisinopril, brilinta. Trop neg. Diet: DIET CARDIAC;   Smoking status:  reports that he is a non-smoker but has been exposed to tobacco smoke. He has never used smokeless tobacco.   PCP: Roger Corrigan MD  Readmission 30 days or less: no  Readmission Risk Score: 9%    Discharge Planning Evaluation  Current Residence:     Living Arrangements:      Support Systems:     Current Services PTA:     Potential Assistance Needed:     Potential Assistance Purchasing Medications:     Does patient want to participate in local refill/ meds to beds program?     Type of Home Care Services:     Patient expects to be discharged to:     Expected Discharge date:      Follow Up Appointment: Best Day/ Time:      Patient Goals/Plan/Treatment Preferences: Spoke with Enedina Reagan; states he lives at home with his wife and did not use any DME or have any HH services PTA. He drives, cares for himself independently, and has a PCP. Jackelin Birch states he plans to return home with his wife at discharge, denies needs, and declines HH. Transportation/Food Security/Housekeeping Addressed:  No issues identified.  Evaluation: no     10/23/20, 12:34 PM EDT    Anticipated discharge home later today or tomorrow. Patient goals/plan/ treatment preferences discussed by  and . Patient goals/plan/ treatment preferences reviewed with patient/ family. Patient/ family verbalize understanding of discharge plan and are in agreement with goal/plan/treatment preferences. Understanding was demonstrated using the teach back method. AVS provided by RN at time of discharge, which includes all necessary medical information pertaining to the patients current course of illness, treatment, post-discharge goals of care, and treatment preferences.

## 2020-10-23 NOTE — PROGRESS NOTES
Cardiology Progress Note      Patient:  Giovanni Silver  YOB: 1955  MRN: [id number]   Acct: [de-identified]  Admit Date:  10/22/2020  Primary Cardiologist: none seen by Dr. Yaneli Chowdhury    Per prior cardiology consult note-  Reason for Consultation:  Chest pain/STEMI        History Of Present Illness:    63 y.o. pleasant male c hx of HLD who presented to the hospital with complaints of chest pain. The chest pain started about 2 hours prior to the arrival.  The pain was substernal, pressure like, associated with 10/10 pain, nonradiating, associated shortness of breath and diaphoresis, no aggravating or alleviating factors, no previous similar episodes. No prior cardiac work up.   EKG in the field and in ED showed ST elevations in the inferior leads and ST depression in the anterior leads      Subjective (Events in last 24 hours):     Pt is sp staged LAD PCI this am     Pt is up in chair - he has no cardiac c/o  No SOB     RT radial cath site - no ecchymosis or hematoma noted - vascband in place - Pulses present - neurovascular check WNL       Family in room - questions answered     Objective:   /81   Pulse 65   Temp 98.4 °F (36.9 °C) (Oral)   Resp 18   Wt 197 lb 15.6 oz (89.8 kg)   SpO2 94%   BMI 28.41 kg/m²        TELEMETRY: SR no ectopy    Physical Exam:  General Appearance: alert and oriented to person, place and time, in no acute distress  Cardiovascular: normal rate, regular rhythm, normal S1 and S2, no murmurs, rubs, clicks, or gallops, distal pulses intact,   Pulmonary/Chest: clear to auscultation bilaterally- no wheezes, rales or rhonchi, normal air movement, no respiratory distress  Abdomen: soft, non-tender, non-distended, normal bowel sounds, no masses Extremities: no cyanosis, clubbing or edema, pulses present    Skin: warm and dry, no rash or erythema  Head: normocephalic and atraumatic   Musculoskeletal: normal range of motion, no joint swelling, deformity or tenderness  Neurological: alert, oriented, normal speech, no focal findings or movement disorder noted    Medications:    aspirin  81 mg Oral Daily    sodium chloride flush  10 mL Intravenous 2 times per day    carvedilol  3.125 mg Oral BID WC    atorvastatin  80 mg Oral Nightly    ticagrelor  90 mg Oral BID       sodium chloride flush, 10 mL, PRN  acetaminophen, 650 mg, Q4H PRN  ondansetron, 4 mg, Q6H PRN        Diagnostics:  EK-OCT-2020 15:05:01 [facility] ROUTINE RETRIEVAL  Sinus rhythm with Premature supraventricular complexes  Inferior infarct Possibly acute  Lateral injury pattern  **  ACUTE MI / STEMI **   Abnormal ECG  No previous ECGs available  Confirmed by Silas Mckeon ([id number]) on 10/22/2020 3:39:38 PM        Echo: pending    Left Heart Cath:   CORONARY ANATOMY:  1. Right coronary artery appears to be a codominant system. There are  mild luminal irregularities in the proximal, mid, and distal portions of  the vessel. Distally, there is a moderate-size PDA and PL branches,  both of which are patent. 2.  Left main is patent and gives rise to LAD and left circumflex  arteries. 3.  Left circumflex artery is patent in the proximal portion beyond the  OM1 vessel. There is total occlusion thrombotic of the left circumflex  artery. 4. LAD has a mildly calcified proximal to mid stenosis of about 70% to  80% followed by mild luminal irregularities at the distal portion of the  LAD.     SUMMARY:  Successful PCI of totally occluded thrombotic left circumflex  artery with Xience Asmita drug-eluting stent.     RECOMMENDATIONS:  1. DAPT with aspirin plus Brilinta for one year. 2.  Lipid-lowering therapy. 3.  Get 2D echocardiogram.  4.  IV fluids. 5.  Monitor radial artery access site for bleeding. 6.  Monitor for arrhythmias.   7.  Needs staged intervention of the proximal to mid LAD in a.m.     All procedure details and management plans were discussed with the  patient and family members. They were in agreement with plan.           Arleen Casillas MD  D: 10/22/2020        - STEMI, Staged PCI of LAD    Lab Data:    Cardiac Enzymes:  No results for input(s): CKTOTAL, CKMB, CKMBINDEX, TROPONINI in the last 72 hours.     CBC:   Lab Results   Component Value Date    WBC 10.3 10/22/2020    RBC 4.71 10/22/2020    HGB 15.7 10/22/2020    HCT 45.7 10/22/2020     10/22/2020       CMP:    Lab Results   Component Value Date     10/22/2020    K 3.9 10/22/2020     10/22/2020    CO2 24 10/22/2020    BUN 21 10/22/2020    CREATININE 1.0 10/22/2020    LABGLOM 75 10/22/2020    GLUCOSE 119 10/22/2020    CALCIUM 10.6 10/22/2020       Hepatic Function Panel:  No results found for: ALKPHOS, ALT, AST, PROT, BILITOT, BILIDIR, IBILI, LABALBU    Magnesium:  No results found for: MG    PT/INR:  No results found for: PROTIME, INR    HgBA1c:  No results found for: LABA1C    FLP:  No results found for: TRIG, HDL, LDLCALC, LDLDIRECT, LABVLDL    TSH:  No results found for: TSH      Assessment:    Inferior-lateral STEMI    S\p cardiac cath 10/22/2020: Successful PCI of totally occluded thrombotic left circumflex artery with Xience Asmita drug-eluting stent    S\p cardiac cath 10/23/2020:   Staged PCI of LAD    ICDMP EF 40-45%    HLP        Plan:  · Echo pending   · Lipid pending   · BP stable Tele SR -stable  · Add ACE for ICDMP   · Pt may be able to DC later this afternoon if stable - will re-assess later          Electronically signed by JON Newsome CNP on 10/23/2020 at 10:41 AM

## 2020-10-23 NOTE — DISCHARGE SUMMARY
Cardiology Discharge Note        Patient:  Sandro Delgado  YOB: 1955  MRN: [id number]         Acct: [de-identified]  Admit Date:  10/22/2020  Primary Cardiologist: none seen by Dr. Yeni Rodriguez     Per prior cardiology consult note-  Reason for Consultation:  Chest pain/STEMI        History Of Present Illness:    63 y. o. pleasant male c hx of HLD who presented to the hospital with complaints of chest pain.  The chest pain started about 2 hours prior to the arrival.  The pain was substernal, pressure like, associated with 10/10 pain, nonradiating, associated shortness of breath and diaphoresis, no aggravating or alleviating factors, no previous similar episodes.  No prior cardiac work up. [facility] [place] in the field and in ED showed ST elevations in the inferior leads and ST depression in the anterior leads        Subjective (Events in last 24 hours):      Pt is sp staged LAD PCI this am      Pt is up in chair - he has no cardiac c/o  No SOB      RT radial cath site - no ecchymosis or hematoma noted - vascband in place - Pulses present - neurovascular check WNL         Family in room - questions answered      Objective:   /81   Pulse 65   Temp 98.4 °F (36.9 °C) (Oral)   Resp 18   Wt 197 lb 15.6 oz (89.8 kg)   SpO2 94%   BMI 28.41 kg/m²          TELEMETRY: SR no ectopy     Physical Exam:  General Appearance: alert and oriented to person, place and time, in no acute distress  Cardiovascular: normal rate, regular rhythm, normal S1 and S2, no murmurs, rubs, clicks, or gallops, distal pulses intact,   Pulmonary/Chest: clear to auscultation bilaterally- no wheezes, rales or rhonchi, normal air movement, no respiratory distress  Abdomen: soft, non-tender, non-distended, normal bowel sounds, no masses Extremities: no cyanosis, clubbing or edema, pulses present    Skin: warm and dry, no rash or erythema  Head: normocephalic and atraumatic   Musculoskeletal: normal range of motion, no joint swelling, deformity or procedure details and management plans were discussed with the  patient and family members. Jose Epaula Morris were in agreement with plan.           Braulio Richardson MD  D: 10/22/2020           - STEMI, Staged PCI of LAD     Lab Data:     Cardiac Enzymes:  No results for input(s): CKTOTAL, CKMB, CKMBINDEX, TROPONINI in the last 72 hours.     CBC:         Lab Results   Component Value Date     WBC 10.3 10/22/2020     RBC 4.71 10/22/2020     HGB 15.7 10/22/2020     HCT 45.7 10/22/2020      10/22/2020         CMP:          Lab Results   Component Value Date      10/22/2020     K 3.9 10/22/2020      10/22/2020     CO2 24 10/22/2020     BUN 21 10/22/2020     CREATININE 1.0 10/22/2020     LABGLOM 75 10/22/2020     GLUCOSE 119 10/22/2020     CALCIUM 10.6 10/22/2020         Hepatic Function Panel:  No results found for: ALKPHOS, ALT, AST, PROT, BILITOT, BILIDIR, IBILI, LABALBU     Magnesium:  No results found for: MG     PT/INR:  No results found for: PROTIME, INR     HgBA1c:  No results found for: LABA1C     FLP:  No results found for: TRIG, HDL, LDLCALC, LDLDIRECT, LABVLDL     TSH:  No results found for: TSH        Assessment:     Inferior-lateral STEMI     S\p cardiac cath 10/22/2020: Successful PCI of totally occluded thrombotic left circumflex artery with Xience Asmita drug-eluting stent     S\p cardiac cath 10/23/2020:   Staged PCI of LAD     ICDMP EF 40-45%     HLP           Plan:  Ok to DC to home   Follow with Dr. Dmitriy Mccartney 2 weeks   No work for 2 weeks          Electronically signed by JON Hanks CNP on 10/23/2020 at 10:41 AM

## 2020-10-23 NOTE — PROGRESS NOTES
Sedation/Analgesia History & Physical      Pt Name: Xochilt Silver  MRN: [id number]  YOB: 1955  Provider Performing Procedure: Wenceslao Purcell MD  Primary Care Physician: Amber Guillory MD      PRE-PROCEDURE   DNR-CCA/DNR-CC []Yes [x]No      PLANNED PROCEDURE     [x]Cath  [x]PCI                []Pacemaker/AICD  []POWER             []Cardioversion []Peripheral angiography/PTA  []Other:        Consent:   The indication, risks and benefits of the procedure and possible therapeutic consequences and alternatives were discussed with the patient. The patient was given the opportunity to ask questions and to have them answered to his/her satisfaction. Risks of the procedure include but are not limited to the following: Bleeding, hematoma including retroperitoneal hematoma, infection, pain and discomfort, injury to the aorta and other blood vessels, rhythm disturbance, low blood pressure, myocardial infarction, stroke, kidney damage/failure, myocardial perforation, allergic reactions to sedatives/contrast material, loss of pulse/vascular compromise requiring surgery, aneurysm/pseudoaneurysm formation, possible loss of a limb/hand/leg, death. Alternatives to and omission of the suggested procedure were discussed. The patient had no further questions and wished to proceed; the consent form was signed.       Indications for the Procedure:     CAD Presentation:  STEMI, Staged PCI of LAD    Anginal Classification within 2 weeks:  CCS III - Symptoms with everyday living activities, i.e., moderate limitation    NYHA Heart Failure Class within 2 weeks: No symptoms      Anti- Anginal Meds within 2 weeks:   ANTI-ANGINAL MEDS: Yes: Beta Blockers, Aspirin and Statin (Any)    Stress or Imaging Studies Performed:  None    CABG:no            MEDICAL HISTORY        Past Medical History:   Diagnosis Date    Hyperlipidemia          Past Surgical History:   Procedure Laterality Date    BACK SURGERY No Known Allergies      MEDICATIONS   Coumadin Use Last 5 Days [x]No []Yes  Antiplatelet drug therapy use last 5 days  []No [x]Yes  Other anticoagulant use last 5 days  [x]No []Yes      No current facility-administered medications on file prior to encounter.       Current Outpatient Medications on File Prior to Encounter   Medication Sig Dispense Refill    albuterol (PROVENTIL) (2.5 MG/3ML) 0.083% nebulizer solution Take 2.5 mg by nebulization every 6 hours as needed for Wheezing      doxepin (SINEQUAN) 50 MG capsule Take 50 mg by mouth nightly      fluticasone (FLONASE) 50 MCG/ACT nasal spray       PROAIR  (90 Base) MCG/ACT inhaler       ADVAIR DISKUS 250-50 MCG/DOSE AEPB          Current Facility-Administered Medications   Medication Dose Route Frequency Provider Last Rate Last Dose    aspirin EC tablet 81 mg  81 mg Oral Daily Félix De Jesus MD   81 mg at 10/23/20 6053    sodium chloride flush 0.9 % injection 10 mL  10 mL Intravenous 2 times per day Betty Enciso MD   10 mL at 10/22/20 2017    sodium chloride flush 0.9 % injection 10 mL  10 mL Intravenous PRN Betty Enciso MD        acetaminophen (TYLENOL) tablet 650 mg  650 mg Oral Q4H PRN Betty Enciso MD        ondansetron (ZOFRAN) injection 4 mg  4 mg Intravenous Q6H PRN Betty Enciso MD        carvedilol (COREG) tablet 3.125 mg  3.125 mg Oral BID  Félix De Jesus MD   3.125 mg at 10/22/20 2013    atorvastatin (LIPITOR) tablet 80 mg  80 mg Oral Nightly Félix De Jesus MD   80 mg at 10/23/20 0021    ticagrelor (BRILINTA) tablet 90 mg  90 mg Oral BID Betty Enciso MD   90 mg at 10/23/20 9061             PHYSICAL:     /76   Pulse 64   Temp 98.1 °F (36.7 °C) (Oral)   Resp 11   Wt 197 lb 15.6 oz (89.8 kg)   SpO2 93%   BMI 28.41 kg/m²     Heart:  [x]Regular rate and rhythm  []Other:    Lungs:  [x]Clear    []Other:    Abdomen: [x]Soft    []Other:    Mental Status: [x]Alert & Oriented  []Other:   Ext:                [x]No edema       []Other:         No results for input(s): CKTOTAL, CKMB, CKMBINDEX, TROPONINI in the last 72 hours. Lab Results   Component Value Date    WBC 10.3 10/22/2020    RBC 4.71 10/22/2020    HGB 15.7 10/22/2020    HCT 45.7 10/22/2020    MCV 97.0 10/22/2020    MCH 33.3 10/22/2020    MCHC 34.4 10/22/2020     10/22/2020    MPV 10.1 10/22/2020       Lab Results   Component Value Date     10/22/2020    K 3.9 10/22/2020     10/22/2020    CO2 24 10/22/2020    BUN 21 10/22/2020    CREATININE 1.0 10/22/2020    CALCIUM 10.6 10/22/2020    LABGLOM 75 10/22/2020    GLUCOSE 119 10/22/2020       No results found for: ALKPHOS, ALT, AST, PROT, BILITOT, BILIDIR, IBILI, LABALBU    No results found for: MG    No components found for: PTPATWAR, PTINRWAR    No results found for: LABA1C    No results found for: TRIG, HDL, LDLCALC, LDLDIRECT, LABVLDL    No results found for: TSH         SEDATION  Planned agent:[x]Midazolam []Meperidine [x]Sublimaze []Morphine []Diazepam  []Other:         ASA Classification:  []1 [x]2 []3 []4 []5  Class 1: A normal healthy patient  Class 2: Pt with mild to moderate systemic disease  Class 3: Severe systemic disease or disturbance  Class 4: Severe systemic disorders that are already life threatening. Class 5: Moribund pt with little chances of survival, for more than 24 hours. Mallampati I Airway Classification:   []1 [x]2 []3 []4      [x]Pre-procedure diagnostic studies complete and results available. Comment:    [x]Previous sedation/anesthesia experiences assessed. Comment:    [x]The patient is an appropriate candidate to undergo the planned procedure sedation and anesthesia. (Refer to nursing sedation/analgesia documentation record)  [x]Formulation and discussion of sedation/procedure plan, risks, and expectations with patient and/or responsible adult completed. [x]Patient examined immediately prior to the procedure.  (Refer to nursing sedation/analgesia documentation

## 2020-10-23 NOTE — PROGRESS NOTES
Patient and wife given discharge instructions via teach back method. Patient and wife verbalize understanding of new medications and side effects. They verbalize understanding of follow up appointments and care of his cath site. MI folder given and reviewed. Patient taken to the discharge lobby in stable condition with belongings.

## 2020-10-23 NOTE — FLOWSHEET NOTE
Pt resting in bed. A&Ox4. Respirations regular and unlabored. Bilateral lung sounds clear. Pt denies pain at this time. Pt requests to go on a walk when assessment complete. Bed alarm on. Call light within reach.

## 2020-10-23 NOTE — PLAN OF CARE
Problem: Pain - Acute:  Goal: Pain level will decrease  Description: Pain level will decrease  10/23/2020 0441 by Jessica Lerma RN  Outcome: Met This Shift  Note: Pt denies pain. Problem: Fluid Volume - Imbalance:  Goal: Will show no signs and symptoms of excessive bleeding  Description: Will show no signs and symptoms of excessive bleeding  10/23/2020 0441 by Jessica Lerma RN  Outcome: Met This Shift  Note: Pt shows no s/s of excessive bleeding. Problem: Fluid Volume - Imbalance:  Goal: Absence of imbalanced fluid volume signs and symptoms  Description: Absence of imbalanced fluid volume signs and symptoms  10/23/2020 0441 by Jessica Lerma RN  Outcome: Met This Shift  Note: Pt is maintaining adequate urinary output and shows no evidence of edema. Lung sounds remain clear. Problem: Anxiety:  Goal: Level of anxiety will decrease  Description: Level of anxiety will decrease  10/23/2020 0441 by Jessica Lerma RN  Outcome: Met This Shift  Note: Pt denies anxiety at this time. Problem: Tissue Perfusion - Cardiopulmonary, Altered:  Goal: Absence of angina  Description: Absence of angina  10/23/2020 0441 by Jessica Lerma RN  Outcome: Met This Shift  Note: Pt denies angina at this time. Problem: Tissue Perfusion - Cardiopulmonary, Altered:  Goal: Hemodynamic stability will improve  Description: Hemodynamic stability will improve  10/23/2020 0441 by Jessica Lerma RN  Outcome: Met This Shift  Note: Pt's blood pressure remains WDL. Heart rate ranges from 45 (when asleep) to low/mid 70's when awake. Problem: Tissue Perfusion - Peripheral, Altered:  Goal: Absence of hematoma at arterial access site  Description: Absence of hematoma at arterial access site  10/23/2020 0441 by Jessica Lerma RN  Outcome: Met This Shift  Note: There is no evidence of a hematoma at the arterial access site at this time.       Problem: Tissue Perfusion - Peripheral, Altered:  Goal: Circulatory function of lower extremities is within specified parameters  Description: Circulatory function of lower extremities is within specified parameters  Outcome: Met This Shift  Note: Circulatory function is WDL in all extremities. Problem: Discharge Planning:  Goal: Discharged to appropriate level of care  Description: Discharged to appropriate level of care  10/23/2020 0441 by Curtis Madera RN  Outcome: Ongoing  Note: Working with interdisciplinary team to determine appropriate level of care after discharge from ICU. Care plan reviewed with patient. Patient verbalizes understanding and acceptance of the plan of care and contributes to goal setting.

## 2020-10-23 NOTE — PLAN OF CARE
Problem: Discharge Planning:  Goal: Discharged to appropriate level of care  Description: Discharged to appropriate level of care  10/23/2020 1528 by Astrid Schumacher RN  Outcome: Ongoing  Note: Patient lives at home with wife, denies additional needs at this time. Problem: Pain - Acute:  Goal: Pain level will decrease  Description: Pain level will decrease  10/23/2020 1528 by Astrid Schumacher RN  Outcome: Ongoing  Note: Patient denies pain will continue to assess       Problem: Fluid Volume - Imbalance:  Goal: Will show no signs and symptoms of excessive bleeding  Description: Will show no signs and symptoms of excessive bleeding  10/23/2020 1528 by Astrid Schumacher RN  Outcome: Ongoing  Note: No signs of bleeding, will continue to assess     Problem: Tissue Perfusion - Cardiopulmonary, Altered:  Goal: Absence of angina  Description: Absence of angina  10/23/2020 1528 by Astrid Schumacher RN  Outcome: Ongoing  Note: Denies chest pain, will continue to assess. Problem: Tissue Perfusion - Peripheral, Altered:  Goal: Absence of hematoma at arterial access site  Description: Absence of hematoma at arterial access site  10/23/2020 1528 by Astrid Schumacher RN  Outcome: Ongoing  Note: No signs of hematoma, will continue to assess  Care plan reviewed with patient and family. Patient and family verbalize understanding of the plan of care and contribute to goal setting.

## 2020-10-23 NOTE — TELEPHONE ENCOUNTER
JULISSA [facility] 10/24/20 ALEX DX STEMI RS 9% DC HOME     PATIENT NEEDS TO BE SEEN WITHIN 2 WEEKS PLEASE ADVISE [phone]

## 2020-10-23 NOTE — PROGRESS NOTES
Ambulated patient in [place], denies chest pain, dizziness of lightheadedness. Will continue to assess.

## 2020-10-23 NOTE — CONSULTS
Nutrition Education    Provided patient and family with Heart Healthy diet education. Reviewed lipid panel with patient from 10/23. Pt reports planned weight loss over the past year or so of ~50 lbs and has maintained his weight around ~200 lbs over the past year. Encouraged patient to consume low-fat dairy products, lean protein sources, whole grains, and ~5 servings of fruits and vegetables daily. Pt states he usually eats one meal daily around supper time and skips breakfast and lunch. Pt states he is active ad enjoys playing golf. Spouse states they usually eat sausage & pancakes or grilled chicken with mashed potatoes for supper. Pt states he uses Michelle spray instead of frying food in butter or oil. Encouraged patient to reduce foods high in saturated and trans fat and replace it with heart healthy fats. Discussed drinking enough water throughout the day and ways to reduce sodium in the diet. Answered all questions at this time. · Verbally reviewed information with Patient & Family  · Educated on Heart Healthy, Low Sodium diet education. · Written educational materials provided. · Contact name and number provided. · Refer to Patient Education activity for more details.     Electronically signed by Farzaneh Castillo RD, LD on 10/23/20 at 2:46 PM EDT    Contact: [phone]

## 2020-10-24 LAB — MRSA SCREEN: NORMAL

## 2020-10-26 NOTE — PLAN OF CARE
Hospital Facility-Based Program  [facility]/Traditional Cardiac Rehab  PHYSICIAN ORDER  Class I Level B based on research  Medical Director:  Dr. Kyle Simons MD     Patient Name: Nevin Flores : 1955  Referring Physician: Dr. Leander Malloy  Date: 10/26/2020  Allergies: Allergies as of 2020    (No Known Allergies)        Diagnosis:  PCI STEMI on 10/22/2020    [] [facility] with telemetry monitoring, resting and exercise        BPs & HRs with each session. Hospital setting for patient safety. [] 72 sessions: 36 exercise sessions, 36 education sessions   [] 36 sessions: 18 exercise sessions, 18 education sessions  [x] Traditional Cardiac Rehab with telemetry monitoring, resting and exercise BPs &       HRs with each session. Hospital setting for patient safety. [x] 36 sessions:  32 exercise sessions, 4 education sessions     Per Patient symptoms, proceed with:   [x]Nitroglycerine 0.4mg SL every 5 minutes prn, maximum of 3, for chest pain   [x]12-lead EKG for symptoms of chest pain or noted change in heart rhythm   [x]Administer O2 per nasal cannula for symptoms of chest pain or acute dyspnea    Physician Prescribed Exercise:  Plan of Care:  Patient to attend exercise sessions with aerobic endurance and strength training for a total of 31-60 min/day, 3 days/week with supplemented 30+ minutes of aerobic exercise at home on days not participating in Cardiac Rehab. Aerobic Endurance Training  Aerobic Endurance mode (TM, AD, NS) starting at 5-8 minutes progressing by 2-3 minutes each week to a total of 15-30 minutes 2-3x/week. Arms only 5 min  Stair step increasing to 2 min  Resistance/strength training:  Hand weights starting at 1-5 lbs increasing in weight by 1-2 lbs and/or per patient tolerance weekly. Start with 8 repetitions and increase the repetitions each exercise session per patient tolerance for a total of 15 repetitions.   Measurable Endurance Goal:  Aerobic endurance goal to be measured in minutes. Start endurance training per patient tolerance at 1-8 minutes per exercise type, progressing to a total of 31-60 minutes using various modes of training (see Exercise Prescription). Progression:    [x] Weekly 5-10% intensity progression, as tolerated, during cardiac rehab sessions. [x] 13-17 Rate of Perceived Exertion on the Deisy Scale (6-20). Measurable Muscular Strength Goal:  Starting at 1-5 lbs x 8 reps, progressing to 5-25 lbs x 15 reps per patient tolerance.       Electronically signed by Kalpana Riojas on 10/26/2020 at 3:11 PM    Exercise Physiologist/Date/Time

## 2020-10-30 ENCOUNTER — OFFICE VISIT (OUTPATIENT)
Dept: CARDIOLOGY CLINIC | Age: 65
End: 2020-10-30
Payer: COMMERCIAL

## 2020-10-30 VITALS
WEIGHT: 206 LBS | HEART RATE: 59 BPM | SYSTOLIC BLOOD PRESSURE: 118 MMHG | DIASTOLIC BLOOD PRESSURE: 60 MMHG | HEIGHT: 70 IN | BODY MASS INDEX: 29.49 KG/M2

## 2020-10-30 PROCEDURE — 99213 OFFICE O/P EST LOW 20 MIN: CPT | Performed by: INTERNAL MEDICINE

## 2020-10-30 PROCEDURE — 93000 ELECTROCARDIOGRAM COMPLETE: CPT | Performed by: INTERNAL MEDICINE

## 2020-10-30 NOTE — PROGRESS NOTES
[address] [place] [address] [address] [place] [address]  Dept: [phone]  Dept Fax: [phone]  Loc: [phone]    Visit Date: 10/30/2020    Mr. Suhas Malloy is a 63 y.o. male  who presented for:  Chief Complaint   Patient presents with    Follow-Up from [address] 2 stents       HPI:   64 yo M c hx HLD with recent STEMI of RCA, EF 40-45%, smoker is here for afollow up. States he is feeling well. Denies any chest pain, sob, palpitations, lightheadedness, dizziness, orthopnea, PND or pedal edema. Current Outpatient Medications:     aspirin 81 MG EC tablet, Take 1 tablet by mouth daily, Disp: 30 tablet, Rfl: 3    atorvastatin (LIPITOR) 80 MG tablet, Take 1 tablet by mouth nightly, Disp: 30 tablet, Rfl: 3    lisinopril (PRINIVIL;ZESTRIL) 5 MG tablet, Take 1 tablet by mouth daily, Disp: 30 tablet, Rfl: 3    carvedilol (COREG) 3.125 MG tablet, Take 1 tablet by mouth 2 times daily (with meals), Disp: 60 tablet, Rfl: 3    ticagrelor (BRILINTA) 90 MG TABS tablet, Take 1 tablet by mouth 2 times daily, Disp: 60 tablet, Rfl: 3    nitroGLYCERIN (NITROSTAT) 0.4 MG SL tablet, up to max of 3 total doses. If no relief after 1 dose, call 911., Disp: 25 tablet, Rfl: 1    albuterol (PROVENTIL) (2.5 MG/3ML) 0.083% nebulizer solution, Take 2.5 mg by nebulization every 6 hours as needed for Wheezing, Disp: , Rfl:     doxepin (SINEQUAN) 50 MG capsule, Take 50 mg by mouth nightly, Disp: , Rfl:     fluticasone (FLONASE) 50 MCG/ACT nasal spray, , Disp: , Rfl:     PROAIR  (90 Base) MCG/ACT inhaler, , Disp: , Rfl:     ADVAIR DISKUS 250-50 MCG/DOSE AEPB, , Disp: , Rfl:     Past Medical History  Del Munoz  has a past medical history of Hyperlipidemia. Social History  Del Munoz  reports that he is a non-smoker but has been exposed to tobacco smoke. He has never used smokeless tobacco. He reports current drug use. Drug: Marijuana.  He Abdominal: Soft. Bowel sounds are normal. No distension. There is no tenderness. Musculoskeletal: Normal range of motion. No edema. Neurological: Alert and oriented to person, place, and time. No cranial nerve deficit. Coordination normal.   Skin: Skin is warm and dry. Psychiatric: Normal mood and affect.        No results found for: CKTOTAL, CKMB, CKMBINDEX    Lab Results   Component Value Date    WBC 10.3 10/22/2020    RBC 4.71 10/22/2020    HGB 15.7 10/22/2020    HCT 45.7 10/22/2020    MCV 97.0 10/22/2020    MCH 33.3 10/22/2020    MCHC 34.4 10/22/2020     10/22/2020    MPV 10.1 10/22/2020       Lab Results   Component Value Date     10/22/2020    K 3.9 10/22/2020     10/22/2020    CO2 24 10/22/2020    BUN 21 10/22/2020    CREATININE 1.0 10/22/2020    CALCIUM 10.6 10/22/2020    LABGLOM 75 10/22/2020    GLUCOSE 119 10/22/2020       No results found for: ALKPHOS, ALT, AST, PROT, BILITOT, BILIDIR, IBILI, LABALBU    No results found for: MG    No results found for: INR, PROTIME      No results found for: LABA1C    Lab Results   Component Value Date    TRIG 184 10/23/2020    HDL 38 10/23/2020    LDLCALC 125 10/23/2020       No results found for: TSH      Testing Reviewed:      I haveindividually reviewed the below cardiac tests    EKG:    ECHO:   Results for orders placed during the hospital encounter of 10/22/20   ECHO Complete 2D W Doppler W Color    Narrative Transthoracic Echocardiography Report (TTE)     Demographics      Patient Name   [facility]        Gender              Male                  Camila Buitrago      MR #           [id number]         Race                                                       Ethnicity      Account #      [de-identified]         Room Number         1158      Accession      [id number]        Date of Study       10/23/2020   Number      Date of Birth  1955        Referring Physician Basia Clark MD Reba Swain MD      Age            63 year(s)        Sonographer         Cody Dale [facility],                                                        T                                       Interpreting        Reba Swain MD                                    Physician     Procedure    Type of Study      TTE procedure:ECHOCARDIOGRAM COMPLETE 2D W DOPPLER W COLOR. Procedure Date  Date: 10/23/2020 Start: 06:28 AM    Study Location: Bedside  Technical Quality: Limited visualization due to poor acoustical window. Indications:STEMI. Additional Medical History:Chest Pain, Marijuana Dependence. Patient Status: Routine    Height: 70 inches Weight: 200 pounds BSA: 2.09 m^2 BMI: 28.7 kg/m^2    BP: 112/76 mmHg    Allergies    - No Known Allergies.      - No Known Allergies. Conclusions      Summary   Technically difficult study due to poor acoustic windows. Left ventricular size is normal and systolic function is moderately   reduced. Ejection fraction was estimated at 40-45%. LV wall thickness is   within normal limits. There is mild inferolateral wall hypokinesis. The right ventricular size appears normal with normal systolic function   and wall thickness. Mild mitral regurgitation is present. Signature      ----------------------------------------------------------------   Electronically signed by Reba Swain MD (Interpreting   physician) on 10/25/2020 at 12:48 PM   ----------------------------------------------------------------      Findings      Mitral Valve   The mitral valve structure is normal with normal leaflet separation. DOPPLER: The transmitral velocity was within the normal range with no   evidence for mitral stenosis. Mild mitral regurgitation is present. Aortic Valve   The aortic valve leaflets were not well visualized. DOPPLER: Transaortic velocity was within the normal range with no evidence   of aortic stenosis. There was no evidence of aortic regurgitation. Tricuspid Valve   The tricuspid valve structure is normal with normal leaflet separation. DOPPLER: There is no evidence of tricuspid stenosis. There was no evidence   of tricuspid regurgitation. Pulmonic Valve   The pulmonic valve was not well visualized. Left Atrium   Left atrial size is normal.      Left Ventricle   Left ventricular size is normal and systolic function is moderately   reduced. Ejection fraction was estimated at 40-45%. LV wall thickness is   within normal limits. There is mild inferolateral wall hypokinesis. Right Atrium   Right atrial size was normal.      Right Ventricle   The right ventricular size appears normal with normal systolic function   and wall thickness. Pericardial Effusion   The pericardium appears normal with no evidence of a pericardial effusion. Pleural Effusion   No evidence of pleural effusion. Aorta / Great Vessels   -Aortic root dimension within normal limits. -IVC size is within normal limits with normal respiratory phasic changes.      M-Mode/2D Measurements & Calculations      LV Diastolic   LV Systolic Dimension:    AV Cusp Separation: 2.9 cmLA   Dimension: 5.1 3.8 cm                    Dimension: 3.6 cmAO Root   cm             LV Volume Diastolic: 56.9 Dimension: 3.6 cmLA Area: 15.6   LV FS:25.5 %   ml                        cm^2   LV PW          LV Volume Systolic: 42.0   Diastolic: 1.4 ml   cm             LV EDV/LV EDV Index: 73.3   Septum         ml/35 m^2LV ESV/LV ESV    RV Diastolic Dimension: 2.6 cm   Diastolic: 1   Index: 93.4 ml/18 m^2   cm             EF Calculated: 47.8 %     LA/Aorta: 1                     LV Length: 8 cm           LA volume/Index: 36.3 ml /17m^2      LV Area   Diastolic:   19.3 cm^2   LV Area   Systolic: 54.2   cm^2     Doppler Measurements & Calculations      MV Peak E-Wave: 62.6  AV Peak Velocity: 140  LVOT Peak Velocity: 91.8 cm/s   cm/s                  cm/s                   LVOT Mean Velocity: 64 cm/s   MV Peak A-Wave: 42.8  AV Peak Gradient: 7.84 LVOT Peak Gradient: 3   cm/s                  mmHg                   mmHgLVOT Mean Gradient: 2   MV E/A Ratio: 1.46    AV Mean Velocity: 86.5 mmHg   MV Peak Gradient:     cm/s   1.57 mmHg             AV Mean Gradient: 4    TV Peak E-Wave: 54.4 cm/s                         mmHg                   TV Peak A-Wave: 38.6 cm/s   MV Deceleration Time: AV VTI: 27.9 cm   373 msec                                     TV Peak Gradient: 1.18 mmHg                            LVOT VTI: 18.6 cm      PV Peak Velocity: 55.3 cm/s   MV E' Septal          IVRT: 116 msec         PV Peak Gradient: 1.22 mmHg   Velocity: 7.7 cm/s   MV A' Septal   Velocity: 8.8 cm/s    AV DVI (VTI): 0.67AV   MV E' Lateral         DVI (Vmax):0.66   Velocity: 10.1 cm/s   MV A' Lateral   Velocity: 11.4 cm/s   E/E' septal: 8.13   E/E' lateral: 6.2   MR Velocity: 481 cm/s     [id number]       STRESS:    CATH:    Assessment/Plan       Diagnosis Orders   1. ST elevation myocardial infarction (STEMI), unspecified artery (HCC)  EKG 12 lead    Echo 2D w doppler w color complete     Recent STEMI 10/22/20 LCX and LAD PCI  EF 40-45%  Smoker    Doing well  Continue Aspirin, Brilinta, Statin  Continue lisinopirl, Coreg  Continue Statin  Will repeat Echo in 4 months, follow up  The patient is asked to make an attempt to improve diet and exercise patterns to aid in medical management of this problem. Advised more plant based nutrition/meditarrean diet   Advised patient to call office or seek immediate medical attention if there is any new onset of  any chest pain, sob, palpitations, lightheadedness, dizziness, orthopnea, PND or pedal edema. All medication side effects were discussed in details. Thank youfor allowing me to participate in the care of this patient.    Please do not hesitate to contact me for any further questions. Return in about 4 months (around 2/28/2021) for Regular follow up, Review testing.        Electronically signed by Jonh Meade MD [facility]  10/30/2020 at 1:18 PM EDT

## 2020-11-04 ENCOUNTER — HOSPITAL ENCOUNTER (OUTPATIENT)
Dept: AUDIOLOGY | Age: 65
Discharge: HOME | End: 2020-11-04

## 2020-11-04 PROCEDURE — 9990000010 HC NO CHARGE VISIT: Performed by: AUDIOLOGIST

## 2020-11-04 NOTE — PROGRESS NOTES
Hearing Aid Fitting Recheck:  The patient is very happy with his hearing aids. I reinstructed him on how to change his filters and he was able to change them. He is not currently using his TV streamer. He will follow up as needed.

## 2020-11-12 ENCOUNTER — HOSPITAL ENCOUNTER (OUTPATIENT)
Dept: CARDIAC REHAB | Age: 65
Setting detail: THERAPIES SERIES
Discharge: HOME | End: 2020-11-12
Payer: COMMERCIAL

## 2020-11-12 VITALS — HEIGHT: 69 IN | BODY MASS INDEX: 30.48 KG/M2 | WEIGHT: 205.8 LBS

## 2020-11-12 PROCEDURE — 93798 PHYS/QHP OP CAR RHAB W/ECG: CPT

## 2020-11-12 NOTE — PROGRESS NOTES
[facility]  Individualized Cardiac Treatment Plan    Patient Name:  Gayle Maldonado  :  1955  Age:  63 y.o. MRN: [id number]  Diagnosis: STEMI/PCI to LCX  Date of Event: 10/22/2020   Physician:  Dr. Beverly Ji  Next Office Visit:  2021  Date Entered Program: 20  Risk Stratifications: [] Low [] Intermediate [x] High  Allergies: No Known Allergies    COVID -19 Screen  Do you have any of the following symptoms:  [] Fever [] Cough [] SOB [] Muscle/Body Ache [] Loss of taste/smell [x] None    Have you traveled outside of the [place]? [] Yes      [x] No    Have you been around anyone who has tested Positive for COVID 19?  [] Yes      [x] No    The following Education has been completed with patient. [x] Wait in the lobby until we call you back to rehab. [x] You must wear a mask while in the medical center, and in cardiac rehab. Please bring your own. [x] Bring your own bottle of water with you. [x] You can bring a visitor with you, however, they will need to sit in the waiting room while you are in cardiac rehab.     Individual Cardiac Treatment Plan -EXERCISE  INITIAL 30 DAY 60 DAY 90 DAY FINAL DAY   EXERCISE  ASSESSMENT/PLAN EXERCISE  REASSESSMENT EXERCISE   REASSESSMENT EXERCISE   REASSESSMENT EXERCISE  DISCHARGE/FOLLOW-UP   Date: 2020 Date: Date: Date: Date:   Session #1 Session # Session # Session # Session #   Stages of Change Stages of Change Stages of Change Stages of Change Stages of Change   [] Pre Contemplation  [] Contemplation  [x] Preparation  [] Action  [] Maintenance  [] Relapse [] Pre Contemplation  [] Contemplation  [] Preparation  [] Action  [] Maintenance  [] Relapse [] Pre Contemplation  [] Contemplation  [] Preparation  [] Action  [] Maintenance  [] Relapse [] Pre Contemplation  [] Contemplation  [] Preparation  [] Action  [] Maintenance  [] Relapse [] Pre Contemplation  [] Contemplation  [] Preparation  [] Action  [] Maintenance  [] Relapse   EXERCISE ASSESSMENT EXERCISE ASSESSMENT EXERCISE ASSESSMENT EXERCISE ASSESSMENT EXERCISE ASSESSMENT   6 Min Walk Test  Distance walked:   0.33 miles  1742 ft  3.53 METs  Max HR:88 BPM     RPE:  13    THR:  103-121  Rhythm:  NSR    6 Min Walk Test  Distance walked:   ** miles  ** ft  ** METs  Max HR:** BPM      RPE:  **    %Changeft = **  Rhythm:  **   DASI: 6.70 METs DASI: ** METs DASI: ** METs DASI: ** METs DASI: ** METs   Return to Work  [place] on returning to work? [x] Yes              [] No   [] Disabled     [] Retired    If yes:  *Job description: [employer]   Return to work: Has Mackenzie Newberry returned to work? [] Yes    [] No    Return to work date set? [] Yes, **    [] No    Mackenzie Newberry is doing ** at work. Return to work: Has Mackenzie Newberry returned to work? [] Yes    [] No    Return to work date set? [] Yes, **    [] No    Mackenzie Newberry is doing ** at work. Return to work: Has Mackenzie Newberry returned to work? [] Yes    [] No    Return to work date set? [] Yes, **    [] No    Mackenzie Newberry is doing ** at work. Return to work: Has Mackenzie Newberry returned to work? [] Yes    [] No    Return to work date set? [] Yes, **    [] No    *Required MET Level achieved for job duties? [] Yes    [] No   Orthopedic Limitations/  [] Yes    [x] No       Orthopedic Limitations  *If patient has orthopedic issue:   Actions/  accomodations needed to make Mackenzie Newberry successful : Orthopedic Limitations   Orthopedic Limitations   Orthopedic Limitations     Fall Risk  Fall risk assessed? [x] Yes      [] No    Balance Issues?   [] Yes      [x] No     [] Walker [] Cane    [x] Safety issues reviewed      Fall Risk  *If patient is a fall risk, action needed to accommodate:  Fall Risk Fall Risk Fall Risk   Home Exercise  [] Yes    [x] No   Home Exercise  [] Yes    [] No  Type: **  Frequency:**  Duration: ** Home Exercise  [] Yes    [] No  Type: **  Frequency: **  Duration: ** Home Exercise  [] Yes    [] No  Type: **  Frequency: **  Duration: ** Home Exercise  [] Yes    [] No  Type: **  Frequency: **  Duration: **   Angina with Activity? [] Yes    [x] No   Angina with Activity? [] Yes    [] No  Angina Management: ** Angina with Activity? [] Yes    [] No  Angina Management: ** Angina with Activity? [] Yes    [] No  Angina Management: ** Angina with Activity?   [] Yes    [] No  Angina Management: **   EXERCISE PLAN EXERCISE PLAN EXERCISE PLAN EXERCISE PLAN EXERCISE PLAN   *Interventions* *Interventions* *Interventions* *Interventions* *Interventions*   Exercise Prescription  (per physician & CR staff) Exercise Prescription  (per physician & CR staff) Exercise Prescription  (per physician & CR staff) Exercise Prescription  (per physician & CR staff) Exercise Prescription  (per physician & CR staff)   Cardiovascular Cardiovascular Cardiovascular Cardiovascular Cardiovascular   Mode:    [x] Treadmill (TM)  [x] Schwinn Airdyne (AD)  [x] Arms Ergometer (AE)  [] NuStep  [] Elliptical (E) MODE:    [] Treadmill (TM)  [] Schwinn Airdyne (AD)  [] Arms Ergometer (AE)  [] NuStep  [] Elliptical (E) MODE:    [] Treadmill (TM)  [] Schwinn Airdyne (AD)  [] Arms Ergometer (AE)  [] NuStep  [] Elliptical (E) MODE:    [] Treadmill (TM)  [] Schwinn Airdyne (AD)  [] Arms Ergometer (AE)  [] NuStep  [] Elliptical (E) MODE:    [] Treadmill (TM)  [] Schwinn Airdyne (AD)  [] Arms Ergometer (AE)  [] NuStep  [] Elliptical (E)   Initial Workloads  TM: [email or web address] 3.6 METs  AD: 1.6 level = 3.5 METs  NS: 104  Marino= 3.5 METs  AE: 0.8 level = 2.2 METs Current Workloads  TM: @ %=  METs  AD:  level =  METs  NS:  Marino=  METs  AE:  level =  METs Current Workloads  TM: @ %=  METs  AD:  level =  METs  NS:  Marino=  METs  AE:  level =  METs Current Workloads  TM: @ %=  METs  AD:  level =  METs  NS:  Marino=  METs  AE:  level =  METs Current Workloads  TM: @ %=  METs  AD:  level =  METs  NS:  Marino=  METs  AE:  level =  METs   Frequency:    ICR: 3x/week  Home: 2-3x/wk Frequency:   ICR: 3x/week  Home: 3x/wk Frequency:  ICR: 3x/week  Home: 3-4x/wk Frequency:  ICR: 3x/week  Home: 3-4x/wk Frequency:  Emerald Cooper will continue exercise at  5-7 days/week   Duration:   Total aerobic exercise = 30-45 min    8 min/mode Duration:  Total aerobic exercises = ** min     **min/mode Duration:  Total aerobic exercises = ** min     **min/mode Duration:  Total aerobic exercises = ** min     **min/mode Duration:  Total erobic exercise =  60-90 min   Intensity:   MET Level = 3.6  RPE = 12-15 Intensity:  Max MET Level = **  RPE = 12-15 Intensity:  Max MET Level = **  RPE = 12-15 Intensity:  Max MET Level = **  RPE = 12-15 Intensity:  Max MET Level = ** RPE = 12-15   Progression: increase aerobic activity up to 15 min over next 4 weeks by increasing time 2-3 min/week. Progression:   Progression:   Progression: Progression:  Increase time/intensity when RPE <13, and HR is in [facility]   [x] Yes      [] No  Upper and Lower body strength training 2x/wk    Wt: 2#       Reps:  8-15    *Increase wt. after completing 15 reps with an RPE of <12/13. [] Yes      [] No  Upper and Lower body strength training 2x/wk    Wt: **#       Reps:  8-15    *Increase wt. after completing 15 reps with an RPE of <12/13. [] Yes      [] No  Upper and Lower body strength training 2x/wk    Wt: **#       Reps:  8-15    *Increase wt. after completing 15 reps with an RPE of <12/13. [] Yes      [] No  Upper and Lower body strength training 2x/wk    Wt: **#       Reps:  8-15    *Increase wt. after completing 15 reps with an RPE of <12/13. Continue Strength Training at home   [] Exercise Log & Strength training handout given     Wt: **#       Reps:  8-15    *Increase wt. after completing 15 reps with an RPE of <12/13.    Flexibility Flexibility Flexibility Flexibility Flexibility   [x] Yes      [] No  25 min session of Core Strength & Flexibility 1x/per week  Attends Core Strength & Flexibility   [] Yes      [] No Attends Core Strength & Flexibility   [] Yes      [] No Attends Core Strength & Flexibility   [] Yes      [] No Continue Core Strength & Flexibility at home   Home Exercise  *Milton verbalizes planning to initiate home walking 3-4 days/week for 20-30 min on days not in rehab. Home Exercise  *Milton verbalizes planning to ** ** days/week for ** min on days not in rehab. Home Exercise  *Milton verbalizes planning to ** ** days/week for ** min on days not in rehab. Home Exercise  *Milton verbalizes planning to ** ** days/week for ** min on days not in rehab. Home Exercise  *Milton verbalizes his/her plan to ** ** days/week for ** min @ **   *Education* *Education* *Education* *Education* *Education*   RPE Scale  [x] Yes      [] No  Exercise Safety  [x] Yes      [] No  Equipment Orientation  [x] Yes      [] No  S/S to Report  [x] Yes      [] No  Warm Up/Cool Down  [x] Yes      [] No  Home Exercise  [x] Yes      [] No    All Exercise Education Completed  [] Yes      [] No   *Goals* *Goals* *Goals* *Goals* *Goals*   Milton plans to:  [x] Attend exercise sessions 3x/wk  [x] initiate home exercise 2-3x/wk for 10-20 min  [x] Increase 6 min walk distance by 10%  [] ** Milton plans to:  [] Attend exercise sessions 3x/wk  [] continue home exercise 2-3x/wk for 20-30 min  [] ** Milton's plans to:  [x] Attend exercise sessions 3x/wk  [x] continue home exercise 2-3x/wk for 30-45 min  [x] Determine plan of exercise following rehab  [] ** Milton plans to:  [] Attend exercise sessions 3x/wk  [] continue home exercise 2-3x/wk for 20-30 min  [] ** Milton achieved exercise goals?    Yes    [] No  If no, why?  **  [] Increased 6 min walk distance by 10%  [] Currently exercising 30-60 min/day, 5-7days/wk   [] Plans to continue exercise on own  [] Plans to join a local fitness center to continue exercise  [] Does not plan to continue to exercise after Plan  Changes: Eating Plan  Changes: Eating Plan Improvements:   Alcohol Use  [] none          [] daily  [] weekly      [x] special          Diet Assessment Tool:  RATE YOUR PLATE  *Given to patient to complete and return. Diet Assessment Tool:    Score: **/69      Diet Assessment Tool: RATE YOUR PLATE  Score: **/20   NUTRITION PLAN NUTRITION PLAN NUTRITION PLAN NUTRITION PLAN NUTRITION PLAN   *Interventions* *Interventions* *Interventions* *Interventions* *Interventions*   Initial Survey given Goal Setting Discussion:   [] Yes      [] No       Follow Up Survey Reviewed & Goals Updated:     Professional Referral  Please check if needed. []Dietician Consult   []Wt. Management Referral  []Other:  Professional Referral  Please check if needed. []Dietician Consult   []Wt. Management Referral  []Other:  Professional Referral  Please check if needed. []Dietician Consult   []Wt. Management Referral  []Other:  Professional Referral  Please check if needed. []Dietician Consult   []Wt. Management Referral  []Other: Professional Referral  Please check if needed. []Dietician Consult   []Wt. Management Referral  []Other:    *Education* *Education* *Education* *Education* *Education*   Nutritional Education Recommended    [x] Overview of The Pritikin Eating Plan video Nutritional Education Attended/Date    See Education Videos under Risk Factors Nutritional Education Attended/Date    See Education Videos under Risk Factors Nutritional Education Attended/Date    See Education Videos under Risk Factors Video Completed? [] Yes  [] No   *Goals* *Goals* *Goals* *Goals* *Goals*   Milton's nutritional goals are as follows:  Complete and return diet survey [] Completed Video  [] Complete and return diet survey [] Completed Video  [] Complete and return diet survey  Milton achieved nutritional goals   [] Yes    [] No  If no, why?      Individual Cardiac Treatment Plan - Psychosocial  PSYCHOSOCIAL  ASSESSMENT/PLAN PSYCHOSOCIAL  REASSESSMENT PSYCHOSOCIAL   REASSESSMENT PSYCHOSOCIAL   REASSESSMENT PSYCHOSOCIAL  DISCHARGE/FOLLOW-UP   Stages of Change Stages of Change Stages of Change Stages of Change Stages of Change   [] Pre Contemplation  [] Contemplation  [] Preparation  [] Action  [x] Maintenance  [] Relapse [] Pre Contemplation  [] Contemplation  [] Preparation  [] Action  [] Maintenance  [] Relapse [] Pre Contemplation  [] Contemplation  [] Preparation  [] Action  [] Maintenance  [] Relapse [] Pre Contemplation  [] Contemplation  [] Preparation  [] Action  [] Maintenance  [] Relapse [] Pre Contemplation  [] Contemplation  [] Preparation  [] Action  [] Maintenance  [] Relapse   PSYCHOSOCIAL ASSESSMENT PSYCHOSOCIAL ASSESSMENT PSYCHOSOCIAL ASSESSMENT PSYCHOSOCIAL ASSESSMENT PSYCHOSOCIAL ASSESSMENT   Behavioral Outcomes Behavioral Outcomes Behavioral Outcomes Behavioral Outcomes Behavioral Outcomes   Tool Used:  Sharad & Alexander, Quality of Life Index, Cardiac Version IV  *Given to patient to complete. Tool Used:    Sharad Munoz, Quality of Life Index, Cardiac Version IV   QOL Index Score: **    HF:**  S&E:**  P&S: **  Family: **   Tool Used:     Sharad & Alexander, Quality of Life Index, Cardiac Version IV  QOL Index Score: **    HF:**  S&E:**  P&S: **  Family: **   PHQ-9 score 0  Depression Severity  [x]Minimal  []Mild   []Moderate  []Moderately Severe  []Severe    PHQ-9 score **  Depression Severity  []Minimal  []Mild   []Moderate  []Moderately Severe   []Severe   Does patient have Family Support? [x] Yes      [] No  No signs of marital/family distress       Within the Past Month:  *Have you wished you were dead or wished you could go to sleep and not wake up? [] Yes      [x] No  *Have you had any thoughts of killing yourself?   [] Yes      [x] No         Using a scale of 0-10, 0=none, 10=very:   Rate your depression: 0  Rate your anxiety:  0  Using a scale of 0-10, 0=none, 10=very:   Rate your depression: **  Rate your anxiety:  ** Using a scale of 0-10, 0=none, 10=very:   Rate your depression: **  Rate your anxiety:  ** Using a scale of 0-10, 0=none, 10=very:   Rate your depression: **  Rate your anxiety:  ** Using a scale of 0-10, 0=none, 10=very:   Rate your depression: **  Rate your anxiety:  **   Signs and Symptoms of Depression Present? [] Yes      [x] No   Signs and Symptoms of Depression Present? [] Yes      [] No  If yes, please explain:  ** Signs and Symptoms of Depression Present? [] Yes      [] No  If yes, please explain:  ** Signs and Symptoms of Depression Present? [] Yes      [] No  If yes, please explain:  ** Signs and Symptoms of Depression Present? [] Yes      [] No  If yes, please explain:  **   Signs and Symptoms of Anxiety Present? [] Yes      [x] No  If yes, please explain:  Thinks he may have had a couple anxiety attacks, but no issues on regular basis Signs and Symptoms of Anxiety Present? [] Yes      [] No  If yes, please explain:  ** Signs and Symptoms of Anxiety Present? [] Yes      [] No  If yes, please explain:  ** Signs and Symptoms of Anxiety Present? [] Yes      [] No  If yes, please explain:  ** Signs and Symptoms of Anxiety Present? [] Yes      [] No  If yes, please explain:  **   [] Patient has poor eye contact   [] Flat affect present. [] Signs of anxiety, anger or hostility    [] Signs social isolation present.    []  Signs of alcohol or substance abuse       PSYCHOSOCIAL PLAN PSYCHOSOCIAL PLAN PSYCHOSOCIAL PLAN PSYCHOSOCIAL PLAN PSYCHOSOCIAL PLAN   *Interventions* *Interventions* *Interventions* *Interventions* *Interventions*   *Please check if needed  [] Psych Consult  [] Physician Referral  [] Stress Management Skills *Please check if needed  [] Psych Consult  [] Physician Referral  [] Stress Management Skills *Please check if needed  [] Psych Consult  [] Physician Referral  [] Stress Management Skills *Please check if needed  [] Psych Consult  [] Physician Referral  [] Stress Management Skills *Please check if needed  [] Psych Consult  [] Physician Referral  [] Stress Management Skills   Is patient currently taking anti-depressant or anti-anxiety medications? [x] Yes      [] No  If yes, please list medications:  doxepin Change in anti-depressant or anti-anxiety medications? [] Yes      [] No  If yes, please list medications:  ** Change in anti-depressant or anti-anxiety medications? [] Yes      [] No  If yes, please list medications:  ** Change in anti-depressant or anti-anxiety medications? [] Yes      [] No  If yes, please list medications:  ** Change in anti-depressant or anti-anxiety medications? [] Yes      [] No  If yes, please list medications:  **   *Education* *Education* *Education* *Education* *Education*   Healthy Mind Education Recommended    [x] Healthy Minds, Bodies,Hearts video See Education Videos under Risk Factor See Education Videos under Risk Factor See Education Videos under Risk Factor Video Completed? [] Yes  [] No   *Goals* *Goals* *Goals* *Goals* *Goals*   Milton's psychosocial goals are as follows:  Complete HADS & Sharad & Alexander, Quality of Life Index, Cardiac Version IV [] Reduce depression symptom severity by 1 level [] Reduce depression symptom severity by 1 level [] Reduce depression symptom severity by 1 level Milton achieved psychosocial goals?   [] Yes    [] No  If no, why?  **  [] Use methods learned to continue to reduce depression symptom severity by 1 level     Individual Cardiac Treatment Plan - Other:  Risk Factor/Education  RISK FACTOR  ASSESSMENT/PLAN RISK FACTOR  REASSESSMENT  RISK FACTOR  REASSESSMENT RISK FACTOR  REASSESSMENT RISK FACTOR   DISCHARGE/FOLLOW-UP   Stages of Change Stages of Change Stages of Change Stages of Change Stages of Change   [] Pre Contemplation  [] Contemplation  [x] Preparation  [] Action  [] Maintenance  [] Relapse [] Pre Contemplation  [] Contemplation  [] Preparation  [] Action  [] Maintenance  [] Relapse [] Pre Contemplation  [] Contemplation  [] Preparation  [] Action  [] Maintenance  [] Relapse [] Pre Contemplation  [] Contemplation  [] Preparation  [] Action  [] Maintenance  [] Relapse [] Pre Contemplation  [] Contemplation  [] Preparation  [] Action  [] Maintenance  [] Relapse   RISK FACTOR/EDUCATION ASSESSMENT RISK FACTOR/EDUCATION ASSESSMENT RISK FACTOR/EDUCATION ASSESSMENT RISK FACTOR/EDUCATION ASSESSMENT RISK FACTOR /EDUCATION ASSESSMENT   Hypertension  [x] Yes      [] No    Resting BP: 122/80  Peak Ex BP:140/76  Medication: lisinopril, carvedilol   Hypertension  Resting BP: **  Peak Ex BP:**  Medication Changes:  [] Yes      [] No Hypertension  Resting BP: **  Peak Ex BP:**  Medication Changes:  [] Yes      [] No Hypertension  Resting BP: **  Peak Ex BP:**  Medication Changes:  [] Yes      [] No Hypertension  Resting BP: **  Peak Ex BP:**  Medication Changes:  [] Yes      [] No   Lipids  HLD/DLD  [x] Yes      [] No  TOTAL CHOL: 200  HDL:  38  LDL:  125  TRI  Medication: atorvastatin Lipids  Medication Changes:  [] Yes      [] No     Lipids  Medication Changes:  [] Yes      [] No     Lipids  Medication Changes:  [] Yes      [] No     Lipids    TOTAL CHOL: **  HDL:  **  LDL:  **  TRIG:  **  Medication Changes:  [] Yes      [] No   Diabetes  [] Yes      [x] No  FBS: 117            Diabetes  Most Recent BS:  BS have been in range  [] Yes      [] No  Medication Changes  [] Yes      [] No   Diabetes  Most Recent BS:  BS have been in range  [] Yes      [] No  Medication Changes  [] Yes      [] No     Diabetes  Most Recent BS:  BS have been in range  [] Yes      [] No  Medication Changes  [] Yes      [] No     Diabetes  Most Recent BS:  BS have been in range  [] Yes      [] No  Medication Changes  [] Yes      [] No       Tobacco Use  [] Current  [] Former  [x] Never    Smokeless Tobacco use:   [] Yes      [x] No   Tobacco Use  Change in smoking status   [] Yes      [] No    Quit date: **   Tobacco Use  Change in smoking status   [] Yes      [] No    Quit date: **   Tobacco Use  Change in smoking status   [] Yes      [] No    Quit date: ** Tobacco Use  Change in smoking status   [] Yes      [] No    Quit date: **            Learning Barriers  Please select one:  []Speech  []Literacy  []Hearing  []Cognitive  []Vision  [x]Ready to Learn Learning Barriers Addressed:   [] Yes      [] No   Learning Barriers Addressed:   [] Yes      [] No   Learning Barriers Addressed:  [] Yes      [] No Learning Barriers Addressed:  [] Yes      [] No     RISK FACTOR/EDUCATION PLAN RISK FACTOR/EDUCATION PLAN RISK FACTOR/EDUCATION PLAN RISK FACTOR/EDUCATION PLAN RISK FACTOR/EDUCATION PLAN   *Interventions* *Interventions* *Interventions* *Interventions* *Interventions*   Recommended Educational Videos    [x] Heart Disease Risk Reduction  [x] Overview of The Pritikin Eating Plan  [x] Move It! [x] Healthy Minds, Bodies, Hearts       Completed Videos       Completed Videos Completed Videos Recommended Educational Videos Completed    [] Yes      [] No    **If not completed, Why? **          Smoking Cessation/Relaspe Prevention Intervention needed? [] Yes      [x] No        Smoking Counseling attended  [] Yes      [] No  **If not completed, Why? **   Professional Referrals:  *Please check if needed  [] Diabetes Clinic  [] Lipid Clinic   [] Other:     Professional Referrals:  *Please check if needed  [] Diabetes Clinic  [] Lipid Clinic   [] Other:   Preventative Medication Preventative Medication Preventative Medication Preventative Medication Preventative Medication   Aspirin  [x] Yes    [] No  Blood Thinner: Clopidogrel/Effient/Brillinta  [x] Yes    [] No  Beta Blocker  [x] Yes    [] No  Ace Inhibitor  [x] Yes    [] No  Statin/Lipid Lowering  [x] Yes    [] No Medication Changes? [] Yes    [] No Medication Changes? [] Yes    [] No Medication Changes? [] Yes    [] No Medication Changes?   [] Yes    [] No   *Education* *Education* *Education* *Education* *Education*   Does Lenny Aguilera require any additional education? [] Yes    [x] No   Does Lenny Aguilera require any additional education? [] Yes    [] No Does Lenny Aguilera require any additional education? [] Yes    [] No Does Lenny Aguilera require any additional education? [] Yes    [] No Does Lenny Aguilera require any additional education?   [] Yes    [] No   Recommended Additional Educational Videos    Medical  [] Diseases of Our Time-Part 1  [] Diseases of Our Times-Part 2  [] Metabolic Syndrome & Belly Fat  [] Hypertension & Heart Disease  [] Decoding Lab Results  [] Aging Enhancing Your QoL  [] Sleep Disorders  Exercise  [] Biomechanical Limitations  [] Body Composition  [] Improve Performance  [] Exercise Action Plan  [] Intro to Yoga  Behavioral  [] How Our Thoughts Can Heal Our Hearts  [] Smoking Cessation  Nutrition  [] Becoming A Pritikin   [] Calorie Density  [] Label Reading-Part 1  [] Facts on Fat  [] Biology of Weight Control  [] Nurtition Action Plan  [] Planning Your Eating Strategy  [] Lable Reading- Part 2  [] Dining Out-Part 1  [] Dining Out - Part 2  [] Targeting Your Nutrition Priorities  [] Fueling a Healthy Body  [] Menu Workshop  [employer]   [] Breakfast & Snacks  [] [employer] Salads & Dressing  [] [address] [place] [address]  [] Soups & Desserts Additional Educational Videos Completed           Additional Educational Videos Completed Additional Educational Videos Completed Additional Educational Videos Completed    [] Yes    [] No   *Goals* *Goals* *Goals* *Goals* *Goals*   Milton's risk factor/education goals are as follows:    [x] Optimal BP <140/90  [] Blood Sugar <120  [x] Attend recommended video education sessions  [x] Takes medications as prescribed 100% of the time   [] **   Milton's risk factor/education goals are as follows:    [x] Optimal BP <140/90  [] Blood Sugar <120  [x] Attend recommended video education sessions  [x] Takes medications as prescribed 100% of the time   [] ** Milton's risk factor/education goals are as follows:    [x] Optimal BP <140/90  [] Blood Sugar <120  [x] Attend recommended video education sessions  [x] Takes medications as prescribed 100% of the time   [] ** Milton's risk factor/education goals are as follows:    [x] Optimal BP <140/90  [] Blood Sugar <120  [x] Attend recommended video education sessions  [x] Takes medications as prescribed 100% of the time   [] ** Milton achieved risk factor goals?   [] Yes    [] No  If no, why?  **       Monitored telemetry has revealed nsr  [] documented arrhythmia at increasing workloads  [] associated symptoms  Monitored telemetry has revealed **  [] documented arrhythmia at increasing workloads  [] associated symptoms ** Monitored telemetry has revealed  [] documented arrhythmia at increasing workloads  [] associated symptoms ** Monitored telemetry has revealed **  [] documented arrhythmia at increasing workloads  [] associated symptoms ** Monitored telemetry has revealed **  [] documented arrhythmia at increasing workloads  [] associated symptoms **   Physician Response    [x] Cardiac rehab is reasonably and medically necessary for continuous cardiac monitoring surveillance  of patient's cardiac activity  [x] Initiate continuous telemerty monitoring and notify me with any concerns  [] Other   Physician Response    [x] Cardiac rehab is reasonably and medically necessary for continuous cardiac monitoring surveillance  of patient's cardiac activity  [x] Continue continuous telemerty monitoring and notify me with any concerns  [] Other     Physician Response      [x] Cardiac rehab is reasonably and medically necessary for continuous cardiac monitoring surveillance  of patient's cardiac activity  [x] Continue continuous telemerty monitoring and notify me with any concerns   [] Other     Physician Response    [x] Cardiac rehab is reasonably and medically necessary for continuous cardiac monitoring surveillance of patient's cardiac activity  [x] Continue continuous telemerty monitoring and notify me with any concerns   [] Other

## 2020-11-16 ENCOUNTER — HOSPITAL ENCOUNTER (OUTPATIENT)
Dept: CARDIAC REHAB | Age: 65
Setting detail: THERAPIES SERIES
Discharge: HOME | End: 2020-11-16
Payer: COMMERCIAL

## 2020-11-16 PROCEDURE — 93798 PHYS/QHP OP CAR RHAB W/ECG: CPT

## 2020-11-16 PROCEDURE — 93797 PHYS/QHP OP CAR RHAB WO ECG: CPT

## 2020-11-16 NOTE — PROGRESS NOTES
Video Education Report - ICR/CR  Name:  Giovanni Silver     Date:  11/16/2020  MRN: [id number]     Session #:  3  Session Length: 40 min    Core Videos        [x]Heart Disease Risk Reduction       []Overview of Pritikin Eating Plan      []Move it          []Calorie Density         []Healthy Minds, Bodies, Hearts        []Label Reading - Part 1       []Metabolic Syndrome and Belly Fat        []How Our Thoughts Can Heal Our Hearts   []Dining Out - Part 1      []Biomechanical Limitations  []Facts on Fat        []Hypertension & Heart Disease    []Diseases of Our Time - Focusing on Diabetes   []Body Composition  []Nutrition Action Plan    []Exercise Action Plan    Exercise      Healthy Mind-Set  []Improving Performance    []Smoking Cessation    []Introduction to [address]  []Aging-Enhancing the Quality of Your Life  []Becoming a Pritikin   []Biology of Weight Control    []Cooking Breakfasts   []Decoding Lab Results    and Snacks  []Diseases of Our Time - Overview   []Cooking Dinner and   []Sleep Disorders     Sides  []Targeting Your Nutrition Priorities   []Healthy Salads &   Dressings  Nutrition      []Cooking Soups and   []Dining Out - Part 2     Desserts  []Fueling a Healthy Body   []Menu Workshop     Overview  []Planning Your Eating Strategy   []The Pritikin Solution  []Vitamins and Minerals    Comments:  Video completed, group discussion

## 2020-11-18 ENCOUNTER — HOSPITAL ENCOUNTER (OUTPATIENT)
Dept: CARDIAC REHAB | Age: 65
Setting detail: THERAPIES SERIES
Discharge: HOME | End: 2020-11-18
Payer: COMMERCIAL

## 2020-11-18 PROCEDURE — 93798 PHYS/QHP OP CAR RHAB W/ECG: CPT

## 2020-11-20 ENCOUNTER — HOSPITAL ENCOUNTER (OUTPATIENT)
Dept: CARDIAC REHAB | Age: 65
Setting detail: THERAPIES SERIES
Discharge: HOME | End: 2020-11-20
Payer: COMMERCIAL

## 2020-11-20 PROCEDURE — 93798 PHYS/QHP OP CAR RHAB W/ECG: CPT

## 2020-11-23 ENCOUNTER — HOSPITAL ENCOUNTER (OUTPATIENT)
Dept: CARDIAC REHAB | Age: 65
Setting detail: THERAPIES SERIES
Discharge: HOME | End: 2020-11-23
Payer: COMMERCIAL

## 2020-11-23 PROCEDURE — 93798 PHYS/QHP OP CAR RHAB W/ECG: CPT

## 2020-11-23 NOTE — PROGRESS NOTES
[facility] Facility[facility] Program  Phase 2 Cardiac Rehab Weekly Progress Report      Patient prescribed exercise:  12:00 class. 3 times per week in rehab, 1-4 times per week at home for the amount of sessions/weeks specified by insurance. Current Levels: Treadmill: 2. 8mph/1% for 10 minutes, Schwinn Airdyne: 64 mcclure for 10 minutes, UBE: 44 mcclure for 5 minutes. Progression Discussion: Maintain/Increase Aerobic exercise 12 minutes to work on endurance. Attempt to increase intensity by 5-20% for each modality this week. Try to increase intensities until Cher Abbott rates the exercises a 13-17 on Deisy RPE.

## 2020-11-25 ENCOUNTER — HOSPITAL ENCOUNTER (OUTPATIENT)
Dept: CARDIAC REHAB | Age: 65
Setting detail: THERAPIES SERIES
Discharge: HOME | End: 2020-11-25
Payer: COMMERCIAL

## 2020-11-25 PROCEDURE — 93798 PHYS/QHP OP CAR RHAB W/ECG: CPT

## 2020-11-27 ENCOUNTER — HOSPITAL ENCOUNTER (OUTPATIENT)
Dept: CARDIAC REHAB | Age: 65
Setting detail: THERAPIES SERIES
End: 2020-11-27
Payer: COMMERCIAL

## 2020-11-30 ENCOUNTER — HOSPITAL ENCOUNTER (OUTPATIENT)
Dept: CARDIAC REHAB | Age: 65
Setting detail: THERAPIES SERIES
Discharge: HOME | End: 2020-11-30
Payer: COMMERCIAL

## 2020-11-30 PROCEDURE — 93798 PHYS/QHP OP CAR RHAB W/ECG: CPT

## 2020-11-30 NOTE — PROGRESS NOTES
[facility]ospital Facility-Based Program  Phase 2 Cardiac Rehab Weekly Progress Report      Patient prescribed exercise:  12:00 class. 3 times per week in rehab, 1-4 times per week at home for the amount of sessions/weeks specified by insurance. Current Levels: Treadmill: 3mph/1% for 15 minutes, Schwinn Airdyne: 64w for 15 minutes, UBE: 50w for 5 minutes. Progression Discussion: Maintain/Increase Aerobic exercise 35 minutes to work on endurance. Attempt to increase intensity by 5-20% for each modality this week. Try to increase intensities until Mackenzie Newberry rates the exercises a 13-17 on Deisy RPE.

## 2020-12-02 ENCOUNTER — HOSPITAL ENCOUNTER (OUTPATIENT)
Dept: CARDIAC REHAB | Age: 65
Setting detail: THERAPIES SERIES
Discharge: HOME | End: 2020-12-02
Payer: COMMERCIAL

## 2020-12-02 PROCEDURE — 93798 PHYS/QHP OP CAR RHAB W/ECG: CPT

## 2020-12-04 ENCOUNTER — HOSPITAL ENCOUNTER (OUTPATIENT)
Dept: CARDIAC REHAB | Age: 65
Setting detail: THERAPIES SERIES
Discharge: HOME | End: 2020-12-04
Payer: COMMERCIAL

## 2020-12-04 PROCEDURE — 93798 PHYS/QHP OP CAR RHAB W/ECG: CPT

## 2020-12-07 ENCOUNTER — HOSPITAL ENCOUNTER (OUTPATIENT)
Dept: CARDIAC REHAB | Age: 65
Setting detail: THERAPIES SERIES
Discharge: HOME | End: 2020-12-07
Payer: COMMERCIAL

## 2020-12-07 PROCEDURE — 93798 PHYS/QHP OP CAR RHAB W/ECG: CPT

## 2020-12-07 NOTE — PROGRESS NOTES
[facility]ospital Facility-Based Program  Phase 2 Cardiac Rehab Weekly Progress Report      Patient prescribed exercise:  12:00 class. 3 times per week in rehab, 1-4 times per week at home for the amount of sessions/weeks specified by insurance. Current Levels: Treadmill: 3. 2mph/1% for 15 minutes, Schwinn Airdyne: Level 70 for 15 minutes, UBE: 56W for 5 minutes. Progression Discussion: Maintain/Increase Aerobic exercise 15 minutes to work on endurance. Attempt to increase intensity by 5-20% for each modality this week. Try to increase intensities until Chad Dandy rates the exercises a 13-17 on Deisy RPE.

## 2020-12-09 ENCOUNTER — HOSPITAL ENCOUNTER (OUTPATIENT)
Dept: CARDIAC REHAB | Age: 65
Setting detail: THERAPIES SERIES
Discharge: HOME | End: 2020-12-09
Payer: COMMERCIAL

## 2020-12-09 PROCEDURE — 93798 PHYS/QHP OP CAR RHAB W/ECG: CPT

## 2020-12-11 ENCOUNTER — HOSPITAL ENCOUNTER (OUTPATIENT)
Dept: CARDIAC REHAB | Age: 65
Setting detail: THERAPIES SERIES
Discharge: HOME | End: 2020-12-11
Payer: COMMERCIAL

## 2020-12-11 PROCEDURE — 93798 PHYS/QHP OP CAR RHAB W/ECG: CPT

## 2020-12-14 ENCOUNTER — HOSPITAL ENCOUNTER (OUTPATIENT)
Dept: CARDIAC REHAB | Age: 65
Setting detail: THERAPIES SERIES
Discharge: HOME | End: 2020-12-14
Payer: COMMERCIAL

## 2020-12-14 PROCEDURE — 93798 PHYS/QHP OP CAR RHAB W/ECG: CPT

## 2020-12-16 ENCOUNTER — HOSPITAL ENCOUNTER (OUTPATIENT)
Dept: CARDIAC REHAB | Age: 65
Setting detail: THERAPIES SERIES
Discharge: HOME | End: 2020-12-16
Payer: COMMERCIAL

## 2020-12-16 PROCEDURE — 93798 PHYS/QHP OP CAR RHAB W/ECG: CPT

## 2020-12-18 ENCOUNTER — HOSPITAL ENCOUNTER (OUTPATIENT)
Dept: CARDIAC REHAB | Age: 65
Setting detail: THERAPIES SERIES
Discharge: HOME | End: 2020-12-18
Payer: COMMERCIAL

## 2020-12-18 PROCEDURE — 93798 PHYS/QHP OP CAR RHAB W/ECG: CPT

## 2020-12-21 ENCOUNTER — HOSPITAL ENCOUNTER (OUTPATIENT)
Dept: CARDIAC REHAB | Age: 65
Setting detail: THERAPIES SERIES
Discharge: HOME | End: 2020-12-21
Payer: COMMERCIAL

## 2020-12-21 PROCEDURE — 93798 PHYS/QHP OP CAR RHAB W/ECG: CPT

## 2020-12-21 NOTE — PROGRESS NOTES
Hospital Facility-Based Program  Phase 2 Cardiac Rehab Weekly Progress Report      Patient prescribed exercise:  12:00 class. 3 times per week in rehab, 1-4 times per week at home for the amount of sessions/weeks specified by insurance. Current Levels: Treadmill: 3. 3mph/2% for 15 minutes, Schwinn Airdyne: 70w for 12 minutes,  UBE: Level 0.8 for 5 minutes. Progression Discussion: Maintain/Increase Aerobic exercise 32 minutes to work on endurance. Attempt to increase intensity by 5-20% for each modality this week. Try to increase intensities until [place] rates the exercises a 13-17 on Deisy RPE.

## 2020-12-23 ENCOUNTER — HOSPITAL ENCOUNTER (OUTPATIENT)
Dept: CARDIAC REHAB | Age: 65
Setting detail: THERAPIES SERIES
End: 2020-12-23
Payer: COMMERCIAL

## 2020-12-25 ENCOUNTER — HOSPITAL ENCOUNTER (OUTPATIENT)
Dept: CARDIAC REHAB | Age: 65
Setting detail: THERAPIES SERIES
End: 2020-12-25
Payer: COMMERCIAL

## 2020-12-28 ENCOUNTER — HOSPITAL ENCOUNTER (OUTPATIENT)
Dept: CARDIAC REHAB | Age: 65
Setting detail: THERAPIES SERIES
Discharge: HOME | End: 2020-12-28
Payer: COMMERCIAL

## 2020-12-28 NOTE — PROGRESS NOTES
Hospital Facility-Based Program  Phase 2 Cardiac Rehab Weekly Progress Report      Patient prescribed exercise:  12:00 class. 3 times per week in rehab, 1-4 times per week at home for the amount of sessions/weeks specified by insurance. Current Levels: Treadmill: 3. 3mph/2% for 15 minutes, Schwinn Airdyne: 76 mcclure for 15 minutes, UBE: 60 mcclure for 5 minutes. Progression Discussion: Maintain/Increase Aerobic exercise 15 minutes to work on endurance. Attempt to increase intensity by 5-20% for each modality this week. Try to increase intensities until Mary [place] rates the exercises a 13-17 on Deisy RPE.

## 2020-12-30 ENCOUNTER — HOSPITAL ENCOUNTER (OUTPATIENT)
Dept: CARDIAC REHAB | Age: 65
Setting detail: THERAPIES SERIES
Discharge: HOME | End: 2020-12-30
Payer: COMMERCIAL

## 2020-12-30 PROCEDURE — 93798 PHYS/QHP OP CAR RHAB W/ECG: CPT

## 2021-01-01 ENCOUNTER — APPOINTMENT (OUTPATIENT)
Dept: CARDIAC REHAB | Age: 66
End: 2021-01-01
Payer: COMMERCIAL

## 2021-01-04 ENCOUNTER — HOSPITAL ENCOUNTER (OUTPATIENT)
Dept: CARDIAC REHAB | Age: 66
Setting detail: THERAPIES SERIES
Discharge: HOME | End: 2021-01-04
Payer: COMMERCIAL

## 2021-01-04 NOTE — PROGRESS NOTES
Hospital Facility-Based Program  Phase 2 Cardiac Rehab Weekly Progress Report      Patient prescribed exercise:  12:00 class. 3 times per week in rehab, 1-4 times per week at home for the amount of sessions/weeks specified by insurance. Current Levels: Treadmill: 3. 3mph/2% for 20 minutes, Schwinn Airdyne: 76watts for 10 minutes,  UBE: Level 0.8 for 5 minutes. Progression Discussion: Maintain/Increase Aerobic exercise 30 minutes to work on endurance. Attempt to increase intensity by 5-20% for each modality this week. Try to increase intensities until Peg Artis rates the exercises a 13-17 on Deisy RPE.

## 2021-01-06 ENCOUNTER — APPOINTMENT (OUTPATIENT)
Dept: CARDIAC REHAB | Age: 66
End: 2021-01-06
Payer: COMMERCIAL

## 2021-01-06 NOTE — PROGRESS NOTES
[facility]  Individualized Cardiac Treatment Plan    Patient Name:  Pb Austin  :  1955  Age:  63 y.o. MRN: [id number]  Diagnosis: STEMI/PCI to LCX  Date of Event: 10/22/2020   Physician:  Dr. Gabi Aleman  Next Office Visit:  2021  Date Entered Program: 20  Risk Stratifications: [] Low [] Intermediate [x] High  Allergies: No Known Allergies    COVID -19 Screen  Do you have any of the following symptoms:  [] Fever [] Cough [] SOB [] Muscle/Body Ache [] Loss of taste/smell [x] None    Have you traveled outside of the [place]? [] Yes      [x] No    Have you been around anyone who has tested Positive for COVID 19?  [] Yes      [x] No    The following Education has been completed with patient. [x] Wait in the lobby until we call you back to rehab. [x] You must wear a mask while in the medical center, and in cardiac rehab. Please bring your own. [x] Bring your own bottle of water with you. [x] You can bring a visitor with you, however, they will need to sit in the waiting room while you are in cardiac rehab.     Individual Cardiac Treatment Plan -EXERCISE  INITIAL 30 DAY 60 DAY 90 DAY FINAL DAY   EXERCISE  ASSESSMENT/PLAN EXERCISE  REASSESSMENT EXERCISE   REASSESSMENT EXERCISE   REASSESSMENT EXERCISE  DISCHARGE/FOLLOW-UP   Date: 2020 Date: 20 Date: 21    Pt is currently on hold due to testing positive for Covid 19   Date: Date:   Session #1 Session #11 Session # Session # Session #   Stages of Change Stages of Change Stages of Change Stages of Change Stages of Change   [] Pre Contemplation  [] Contemplation  [x] Preparation  [] Action  [] Maintenance  [] Relapse [] Pre Contemplation  [] Contemplation  [x] Preparation  [] Action  [] Maintenance  [] Relapse [] Pre Contemplation  [] Contemplation  [] Preparation  [] Action  [] Maintenance  [] Relapse [] Pre Contemplation  [] Contemplation  [] Preparation  [] Action  [] Maintenance  [] Relapse [] Pre Contemplation  [] Contemplation  [] Preparation  [] Action  [] Maintenance  [] Relapse   EXERCISE ASSESSMENT EXERCISE ASSESSMENT EXERCISE ASSESSMENT EXERCISE ASSESSMENT EXERCISE ASSESSMENT   6 Min Walk Test  Distance walked:   0.33 miles  1742 ft  3.53 METs  Max HR:88 BPM     RPE:  13    THR:  103-121  Rhythm:  NSR    6 Min Walk Test  Distance walked:   ** miles  ** ft  ** METs  Max HR:** BPM      RPE:  **    %Changeft = **  Rhythm:  **   DASI: 6.70 METs DASI: 8.97 METs DASI: ** METs DASI: ** METs DASI: ** METs   Return to Work  [place] on returning to work? [x] Yes              [] No   [] Disabled     [] Retired    If yes:  *Job description: [employer]   Return to work: Has Nanette Tam returned to work? [x] Yes    [] No    .     Return to work: Has Nanette Tam returned to work? [x] Yes    [] No      Return to work: Has Nanette Tam returned to work? [] Yes    [] No    Return to work date set? [] Yes, **    [] No    Nanette Tam is doing ** at work. Return to work: Has Nanette Tam returned to work? [] Yes    [] No    Return to work date set? [] Yes, **    [] No    *Required MET Level achieved for job duties? [] Yes    [] No   Orthopedic Limitations/  [] Yes    [x] No       Orthopedic Limitations  *If patient has orthopedic issue:   Actions/  accomodations needed to make Nanette Tam successful : na Orthopedic Limitations  na Orthopedic Limitations   Orthopedic Limitations     Fall Risk  Fall risk assessed? [x] Yes      [] No    Balance Issues?   [] Yes      [x] No     [] Walker [] Cane    [x] Safety issues reviewed      Fall Risk  *If patient is a fall risk, action needed to accommodate: na Fall Risk  na Fall Risk Fall Risk   Home Exercise  [] Yes    [x] No   Home Exercise  [] Yes    [x] No  Type: **  Frequency:**  Duration: ** Home Exercise  [] Yes    [] No   Home Exercise  [] Yes    [] No  Type: **  Frequency: **  Duration: ** Home Exercise  [] Yes    [] No  Type: **  Frequency: **  Duration: **   Angina with Activity? [] Yes    [x] No   Angina with Activity? [] Yes    [x] No  Angina Management: na Angina with Activity? [] Yes    [] No   Angina with Activity? [] Yes    [] No  Angina Management: ** Angina with Activity?   [] Yes    [] No  Angina Management: **   EXERCISE PLAN EXERCISE PLAN EXERCISE PLAN EXERCISE PLAN EXERCISE PLAN   *Interventions* *Interventions* *Interventions* *Interventions* *Interventions*   Exercise Prescription  (per physician & CR staff) Exercise Prescription  (per physician & CR staff) Exercise Prescription  (per physician & CR staff) Exercise Prescription  (per physician & CR staff) Exercise Prescription  (per physician & CR staff)   Cardiovascular Cardiovascular Cardiovascular Cardiovascular Cardiovascular   Mode:    [x] Treadmill (TM)  [x] Schwinn Airdyne (AD)  [x] Arms Ergometer (AE)  [] NuStep  [] Elliptical (E) MODE:    [x] Treadmill (TM)  [x] Schwinn Airdyne (AD)  [x] Arms Ergometer (AE)  [] NuStep  [] Elliptical (E) MODE:    [x] Treadmill (TM)  [x] Schwinn Airdyne (AD)  [x] Arms Ergometer (AE)  [] NuStep  [] Elliptical (E) MODE:    [] Treadmill (TM)  [] Schwinn Airdyne (AD)  [] Arms Ergometer (AE)  [] NuStep  [] Elliptical (E) MODE:    [] Treadmill (TM)  [] Schwinn Airdyne (AD)  [] Arms Ergometer (AE)  [] NuStep  [] Elliptical (E)   Initial Workloads  TM: [email or web address] 3.6 METs  AD: 1.6 level = 3.5 METs  NS: 104  [place]= 3.5 METs  AE: 0.8 level = 2.2 METs Current Workloads  TM: 3.2@ 1%= 3.9 METs  AD: 70Watts  = 4.3 METs    AE: 58 mcclure = 2.2 METs Current Workloads  TM: [email or web address] %=4.5  METs  AD: 76Watts = 4.5 METs    AE: 60 Mccl[place] = 2.2 METs Current Workloads  TM: @ %=  METs  AD:  level =  METs  NS:  [place]cl[place]=  METs  AE:  level =  METs Current Workloads  TM: @ %=  METs  AD:  level =  METs  NS:  [place]=  METs  AE:  level =  METs   Frequency:    ICR: 3x/week  Home: 2-3x/wk Frequency:   ICR: 3x/week  Home: 3x/wk Frequency:  ICR: 3x/week  Home: 3-4x/wk exercise  [] Does not plan to continue to exercise after rehab   Return to ADL or Hobbies:  Pearley Session would like to improve strength and endurance so he is able to return to hunt and play disc golf Return to ADL or Hobbies:  Pearley Session would like to improve strength and endurance so he is able to return to hunt and play disc golf Return to ADL or Hobbies:  Pearley Session would like to improve strength and endurance so he is able to return to all previous activites Return to ADL or Hobbies:  Pearley Session would like to improve strength and endurance so he/she is able to return to ** Return to ADL or Hobbies:  Pearley Session would like to improve strength and endurance so he/she is able to return to **    *MET level required for above goal:  3.0-5.0 METs MET level Achieved:  4. 3METs MET level Achieved:  4.5METs MET level Achieved:  **METs MET level Achieved:  **METs     Individual Cardiac Treatment Plan - Nutrition  NUTRITION  ASSESSMENT/PLAN NUTRITION  REASSESSMENT NUTRITION   REASSESSMENT NUTRITION   REASSESSMENT NUTRITION  DISCHARGE/FOLLOW-UP   Stages of Change Stages of Change Stages of Change Stages of Change Stages of Change   [] Pre Contemplation  [] Contemplation  [x] Preparation  [] Action  [] Maintenance  [] Relapse [x] Pre Contemplation  [] Contemplation  [] Preparation  [] Action  [] Maintenance  [] Relapse [] Pre Contemplation  [] Contemplation  [] Preparation  [] Action  [] Maintenance  [] Relapse [] Pre Contemplation  [] Contemplation  [] Preparation  [] Action  [] Maintenance  [] Relapse [] Pre Contemplation  [] Contemplation  [] Preparation  [] Action  [] Maintenance  [] Relapse   NUTRITION ASSESSMENT NUTRITION ASSESSMENT NUTRITION ASSESSMENT NUTRITION ASSESSMENT NUTRITION ASSESSMENT   Weight Management  Weight: 205.8        Height: 5'9   BMI: 30.5  Weight Management  Weight: 206.6                  Weight Management  Weight: 206.4                 Weight Management  Weight: ** Weight Management  Weight: ** BMI: **   Eating Plan  Current eating habits: more fruits and veggies Eating Plan  Changes: none reported Eating Plan  Changes: Eating Plan  Changes: Eating Plan Improvements:   Alcohol Use  [] none          [] daily  [] weekly      [x] special          Diet Assessment Tool:  RATE YOUR PLATE  *Given to patient to complete and return. Diet Assessment Tool:    Score: 43/69      Diet Assessment Tool: RATE YOUR PLATE  Score: **/87   NUTRITION PLAN NUTRITION PLAN NUTRITION PLAN NUTRITION PLAN NUTRITION PLAN   *Interventions* *Interventions* *Interventions* *Interventions* *Interventions*   Initial Survey given Goal Setting Discussion:   [x] Yes      [] No       Follow Up Survey Reviewed & Goals Updated:     Professional Referral  Please check if needed. []Dietician Consult   []Wt. Management Referral  []Other:  Professional Referral  Please check if needed. []Dietician Consult   []Wt. Management Referral  []Other:  Professional Referral  Please check if needed. []Dietician Consult   []Wt. Management Referral  []Other:  Professional Referral  Please check if needed. []Dietician Consult   []Wt. Management Referral  []Other: Professional Referral  Please check if needed. []Dietician Consult   []Wt. Management Referral  []Other:    *Education* *Education* *Education* *Education* *Education*   Nutritional Education Recommended    [x] Overview of [employer] Eating Plan video Nutritional Education Attended/Date    See Education Videos under Risk Factors Nutritional Education Attended/Date    See Education Videos under Risk Factors Nutritional Education Attended/Date    See Education Videos under Risk Factors Video Completed?     [] Yes  [] No   *Goals* *Goals* *Goals* *Goals* *Goals*   Milton's nutritional goals are as follows:  Complete and return diet survey [x] Completed Video  [x] Complete and return diet survey [x] Complete Video  [x] Complete and return diet survey  Milton achieved nutritional goals   [] Yes [] No  If no, why? Individual Cardiac Treatment Plan - Psychosocial  PSYCHOSOCIAL  ASSESSMENT/PLAN PSYCHOSOCIAL  REASSESSMENT PSYCHOSOCIAL   REASSESSMENT PSYCHOSOCIAL   REASSESSMENT PSYCHOSOCIAL  DISCHARGE/FOLLOW-UP   Stages of Change Stages of Change Stages of Change Stages of Change Stages of Change   [] Pre Contemplation  [] Contemplation  [] Preparation  [] Action  [x] Maintenance  [] Relapse [] Pre Contemplation  [] Contemplation  [] Preparation  [] Action  [x] Maintenance  [] Relapse [] Pre Contemplation  [] Contemplation  [] Preparation  [] Action  [] Maintenance  [] Relapse [] Pre Contemplation  [] Contemplation  [] Preparation  [] Action  [] Maintenance  [] Relapse [] Pre Contemplation  [] Contemplation  [] Preparation  [] Action  [] Maintenance  [] Relapse   PSYCHOSOCIAL ASSESSMENT PSYCHOSOCIAL ASSESSMENT PSYCHOSOCIAL ASSESSMENT PSYCHOSOCIAL ASSESSMENT PSYCHOSOCIAL ASSESSMENT   Behavioral Outcomes Behavioral Outcomes Behavioral Outcomes Behavioral Outcomes Behavioral Outcomes   Tool Used:  Sharad & Alexander, Quality of Life Index, Cardiac Version IV  *Given to patient to complete. Tool Used:    Sharad & Alexander, Quality of Life Index, Cardiac Version IV   QOL Index Score: 27.88    HF:26.5  S&E:29.06  P&S: 28.29  Family: 30   Tool Used:     Sharad & Alexander, Quality of Life Index, Cardiac Version IV  QOL Index Score: **    HF:**  S&E:**  P&S: **  Family: **   PHQ-9 score 0  Depression Severity  [x]Minimal  []Mild   []Moderate  []Moderately Severe  []Severe    PHQ-9 score **  Depression Severity  []Minimal  []Mild   []Moderate  []Moderately Severe   []Severe   Does patient have Family Support? [x] Yes      [] No  No signs of marital/family distress       Within the Past Month:  *Have you wished you were dead or wished you could go to sleep and not wake up? [] Yes      [x] No  *Have you had any thoughts of killing yourself?   [] Yes      [x] No         Using a scale of 0-10, 0=none, 10=very:   Rate your depression: 0  Rate your anxiety:  0  Using a scale of 0-10, 0=none, 10=very:   Rate your depression: 0  Rate your anxiety:  4 Using a scale of 0-10, 0=none, 10=very:   Rate your depression: **  Rate your anxiety:  ** Using a scale of 0-10, 0=none, 10=very:   Rate your depression: **  Rate your anxiety:  ** Using a scale of 0-10, 0=none, 10=very:   Rate your depression: **  Rate your anxiety:  **   Signs and Symptoms of Depression Present? [] Yes      [x] No   Signs and Symptoms of Depression Present? [] Yes      [x] No   Signs and Symptoms of Depression Present? [] Yes      [] No  If yes, please explain:  ** Signs and Symptoms of Depression Present? [] Yes      [] No  If yes, please explain:  ** Signs and Symptoms of Depression Present? [] Yes      [] No  If yes, please explain:  **   Signs and Symptoms of Anxiety Present? [] Yes      [x] No  If yes, please explain:  Thinks he may have had a couple anxiety attacks, but no issues on regular basis Signs and Symptoms of Anxiety Present? [] Yes      [x] No   Signs and Symptoms of Anxiety Present? [] Yes      [] No  If yes, please explain:  ** Signs and Symptoms of Anxiety Present? [] Yes      [] No  If yes, please explain:  ** Signs and Symptoms of Anxiety Present? [] Yes      [] No  If yes, please explain:  **   [] Patient has poor eye contact   [] Flat affect present. [] Signs of anxiety, anger or hostility    [] Signs social isolation present.    []  Signs of alcohol or substance abuse       PSYCHOSOCIAL PLAN PSYCHOSOCIAL PLAN PSYCHOSOCIAL PLAN PSYCHOSOCIAL PLAN PSYCHOSOCIAL PLAN   *Interventions* *Interventions* *Interventions* *Interventions* *Interventions*   *Please check if needed  [] Psych Consult  [] Physician Referral  [] Stress Management Skills *Please check if needed  [] Psych Consult  [] Physician Referral  [x] Stress Management Skills *Please check if needed  [] Psych Consult  [] Physician Referral  [x] Stress Management Skills *Please check if needed  [] Psych Consult  [] Physician Referral  [] Stress Management Skills *Please check if needed  [] Psych Consult  [] Physician Referral  [] Stress Management Skills   Is patient currently taking anti-depressant or anti-anxiety medications? [x] Yes      [] No  If yes, please list medications:  doxepin Change in anti-depressant or anti-anxiety medications? [] Yes      [x] No   Change in anti-depressant or anti-anxiety medications? [] Yes      [x] No   Change in anti-depressant or anti-anxiety medications? [] Yes      [] No  If yes, please list medications:  ** Change in anti-depressant or anti-anxiety medications? [] Yes      [] No  If yes, please list medications:  **   *Education* *Education* *Education* *Education* *Education*   Healthy Mind Education Recommended    [x] Healthy Minds, Bodies,Hearts video See Education Videos under Risk Factor See Education Videos under Risk Factor See Education Videos under Risk Factor Video Completed? [] Yes  [] No   *Goals* *Goals* *Goals* *Goals* *Goals*   Milton's psychosocial goals are as follows:  Complete HADS & Sharad & Alexander, Quality of Life Index, Cardiac Version IV [x] Reduce depression symptom severity by 1 level [x] Reduce depression symptom severity by 1 level [] Reduce depression symptom severity by 1 level Milton achieved psychosocial goals?   [] Yes    [] No  If no, why?  **  [] Use methods learned to continue to reduce depression symptom severity by 1 level     Individual Cardiac Treatment Plan - Other:  Risk Factor/Education  RISK FACTOR  ASSESSMENT/PLAN RISK FACTOR  REASSESSMENT  RISK FACTOR  REASSESSMENT RISK FACTOR  REASSESSMENT RISK FACTOR   DISCHARGE/FOLLOW-UP   Stages of Change Stages of Change Stages of Change Stages of Change Stages of Change   [] Pre Contemplation  [] Contemplation  [x] Preparation  [] Action  [] Maintenance  [] Relapse [] Pre Contemplation  [x] Contemplation  [] Preparation  [] Action  [] Maintenance  [] Relapse [] Pre Contemplation  [] Contemplation  [] Preparation  [] Action  [] Maintenance  [] Relapse [] Pre Contemplation  [] Contemplation  [] Preparation  [] Action  [] Maintenance  [] Relapse [] Pre Contemplation  [] Contemplation  [] Preparation  [] Action  [] Maintenance  [] Relapse   RISK FACTOR/EDUCATION ASSESSMENT RISK FACTOR/EDUCATION ASSESSMENT RISK FACTOR/EDUCATION ASSESSMENT RISK FACTOR/EDUCATION ASSESSMENT RISK FACTOR /EDUCATION ASSESSMENT   Hypertension  [x] Yes      [] No    Resting BP: 122/80  Peak Ex BP:140/76  Medication: lisinopril, carvedilol   Hypertension  Resting BP: 112/72  Peak Ex BP:144/72  Medication Changes:  [] Yes      [x] No Hypertension  Resting BP: 110/68    Peak Ex BP:162/74    Medication Changes:  [] Yes      [x] No Hypertension  Resting BP: **  Peak Ex BP:**  Medication Changes:  [] Yes      [] No Hypertension  Resting BP: **  Peak Ex BP:**  Medication Changes:  [] Yes      [] No   Lipids  HLD/DLD  [x] Yes      [] No  TOTAL CHOL: 200  HDL:  38  LDL:  125  TRI  Medication: atorvastatin Lipids  Medication Changes:  [] Yes      [x] No     Lipids  Medication Changes:  [] Yes      [x] No     Lipids  Medication Changes:  [] Yes      [] No     Lipids    TOTAL CHOL: **  HDL:  **  LDL:  **  TRIG:  **  Medication Changes:  [] Yes      [] No   Diabetes  [] Yes      [x] No  FBS: 117            Diabetes  Most Recent BS:  BS have been in range  [] Yes      [x] No  Medication Changes  [] Yes      [x] No   Diabetes  na     Diabetes  Most Recent BS:  BS have been in range  [] Yes      [] No  Medication Changes  [] Yes      [] No     Diabetes  Most Recent BS:  BS have been in range  [] Yes      [] No  Medication Changes  [] Yes      [] No       Tobacco Use  [x] Current  [] Former  [] Never    Smokeless Tobacco use:   [] Yes      [x] No   Tobacco Use  Change in smoking status   [] Yes      [x] No    Smoking 4-5 cig per day   Tobacco Use  Change in smoking status   [] Yes [x] No       Tobacco Use  Change in smoking status   [] Yes      [] No    Quit date: ** Tobacco Use  Change in smoking status   [] Yes      [] No    Quit date: **            Learning Barriers  Please select one:  []Speech  []Literacy  []Hearing  []Cognitive  []Vision  [x]Ready to Learn Learning Barriers Addressed:   [] Yes      [] No  na Learning Barriers Addressed:   [] Yes      [x] No   Learning Barriers Addressed:  [] Yes      [] No Learning Barriers Addressed:  [] Yes      [] No     RISK FACTOR/EDUCATION PLAN RISK FACTOR/EDUCATION PLAN RISK FACTOR/EDUCATION PLAN RISK FACTOR/EDUCATION PLAN RISK FACTOR/EDUCATION PLAN   *Interventions* *Interventions* *Interventions* *Interventions* *Interventions*   Recommended Educational Videos    [x] Heart Disease Risk Reduction  [x] Overview of The [employer] Eating Plan  [x] Move It! [x] Healthy Minds, Bodies, Hearts       Completed Videos      11/16/20  Heart Disease Risk Reduction Completed Videos Completed Videos Recommended Educational Videos Completed    [] Yes      [] No    **If not completed, Why? **          Smoking Cessation/Relaspe Prevention Intervention needed? [] Yes      [x] No   Smoking Cessation/Relaspe Prevention Intervention needed? [x] Yes      Smoking Counseling attended  [] Yes      [] No  **If not completed, Why? **   Professional Referrals:  *Please check if needed  [] Diabetes Clinic  [] Lipid Clinic   [] Other:     Professional Referrals:  *Please check if needed  [] Diabetes Clinic  [] Lipid Clinic   [] Other:   Preventative Medication Preventative Medication Preventative Medication Preventative Medication Preventative Medication   Aspirin  [x] Yes    [] No  Blood Thinner: Clopidogrel/Effient/Brillinta  [x] Yes    [] No  Beta Blocker  [x] Yes    [] No  Ace Inhibitor  [x] Yes    [] No  Statin/Lipid Lowering  [x] Yes    [] No Medication Changes? [] Yes    [x] No Medication Changes? [] Yes    [x] No Medication Changes?   [] Yes    [] No Medication Takes medications as prescribed 100% of the time   [] ** Milton's risk factor/education goals are as follows:    [x] Optimal BP <140/90  [] Blood Sugar <120  [x] Attend recommended video education sessions  [x] Takes medications as prescribed 100% of the time   [] ** Milton's risk factor/education goals are as follows:    [x] Optimal BP <140/90  [] Blood Sugar <120  [x] Attend recommended video education sessions  [x] Takes medications as prescribed 100% of the time   [] ** Milton achieved risk factor goals?   [] Yes    [] No  If no, why?  **       Monitored telemetry has revealed nsr  [] documented arrhythmia at increasing workloads  [] associated symptoms  Monitored telemetry has revealed NSR[]  Monitored telemetry has revealed NSR   Monitored telemetry has revealed **  [] documented arrhythmia at increasing workloads  [] associated symptoms ** Monitored telemetry has revealed **  [] documented arrhythmia at increasing workloads  [] associated symptoms **   Physician Response    [x] Cardiac rehab is reasonably and medically necessary for continuous cardiac monitoring surveillance  of patient's cardiac activity  [x] Initiate continuous telemerty monitoring and notify me with any concerns  [] Other   Physician Response    [x] Cardiac rehab is reasonably and medically necessary for continuous cardiac monitoring surveillance  of patient's cardiac activity  [x] Continue continuous telemerty monitoring and notify me with any concerns  [] Other     Physician Response      [x] Cardiac rehab is reasonably and medically necessary for continuous cardiac monitoring surveillance  of patient's cardiac activity  [x] Continue continuous telemerty monitoring and notify me with any concerns   [] Other     Physician Response    [x] Cardiac rehab is reasonably and medically necessary for continuous cardiac monitoring surveillance  of patient's cardiac activity  [x] Continue continuous telemerty monitoring and notify me with any concerns   [] Other        Cosigned by:  Getachew Cobos MD at 11/13/2020 10:24 AM    Revision History     Date/Time  User  Provider Type  Action    11/13/2020 10:24 AM  Getachew Cobos MD  Physician  Cosign    11/12/2020  1:26 PM  Veronica Roque  Exercise Physiologist  Sign      Cosigned by: Getachew Cobos MD at 12/10/2020  8:11 AM   Revision History  Date/Time User Provider Type Action   12/10/2020  8:11 AM Getachew Cobos MD Physician Cosign   12/9/2020 12:34 PM Hodan Rothman Exercise Physiologist Sign

## 2021-01-08 ENCOUNTER — APPOINTMENT (OUTPATIENT)
Dept: CARDIAC REHAB | Age: 66
End: 2021-01-08
Payer: COMMERCIAL

## 2021-01-11 ENCOUNTER — HOSPITAL ENCOUNTER (OUTPATIENT)
Dept: CARDIAC REHAB | Age: 66
Setting detail: THERAPIES SERIES
Discharge: HOME | End: 2021-01-11
Payer: COMMERCIAL

## 2021-01-11 NOTE — PROGRESS NOTES
[facility]  Phase 2 Cardiac Rehab Weekly Progress Report      Patient prescribed exercise:  12:00 class. 3 times per week in rehab, 1-4 times per week at home for the amount of sessions/weeks specified by insurance.     Patient remains on hold due to COVID-19 positive test.

## 2021-01-13 ENCOUNTER — HOSPITAL ENCOUNTER (OUTPATIENT)
Dept: CARDIAC REHAB | Age: 66
Setting detail: THERAPIES SERIES
Discharge: HOME | End: 2021-01-13
Payer: COMMERCIAL

## 2021-01-13 PROCEDURE — 93798 PHYS/QHP OP CAR RHAB W/ECG: CPT

## 2021-01-14 ENCOUNTER — HOSPITAL ENCOUNTER (OUTPATIENT)
Dept: CT IMAGING | Age: 66
Discharge: HOME | End: 2021-01-14
Payer: COMMERCIAL

## 2021-01-14 DIAGNOSIS — Z87.891 PERSONAL HISTORY OF TOBACCO USE, PRESENTING HAZARDS TO HEALTH: ICD-10-CM

## 2021-01-14 PROCEDURE — 71271 CT THORAX LUNG CANCER SCR C-: CPT

## 2021-01-15 ENCOUNTER — HOSPITAL ENCOUNTER (OUTPATIENT)
Dept: CARDIAC REHAB | Age: 66
Setting detail: THERAPIES SERIES
Discharge: HOME | End: 2021-01-15
Payer: COMMERCIAL

## 2021-01-15 PROCEDURE — 93798 PHYS/QHP OP CAR RHAB W/ECG: CPT

## 2021-01-18 ENCOUNTER — HOSPITAL ENCOUNTER (OUTPATIENT)
Dept: CARDIAC REHAB | Age: 66
Setting detail: THERAPIES SERIES
Discharge: HOME | End: 2021-01-18
Payer: COMMERCIAL

## 2021-01-18 PROCEDURE — 93798 PHYS/QHP OP CAR RHAB W/ECG: CPT

## 2021-01-18 NOTE — PROGRESS NOTES
[facility]  Phase 2 Cardiac Rehab Weekly Progress Report      Patient prescribed exercise:  12:00 class. 3 times per week in rehab, 1-4 times per week at home for the amount of sessions/weeks specified by insurance. Current Levels: Treadmill: 3.0mph/1% for 15 minutes, Schwinn Airdyne: Level 76W for 15 minutes,bUBE: Level 60W for 5 minutes. Progression Discussion: Maintain/Increase Aerobic exercise 15 minutes to work on endurance. Attempt to increase intensity by 5-20% for each modality this week. Try to increase intensities until [place]j [place] rates the exercises a 13-17 on Deisy RPE.

## 2021-01-20 ENCOUNTER — HOSPITAL ENCOUNTER (OUTPATIENT)
Dept: CARDIAC REHAB | Age: 66
Setting detail: THERAPIES SERIES
Discharge: HOME | End: 2021-01-20
Payer: COMMERCIAL

## 2021-01-20 PROCEDURE — 93798 PHYS/QHP OP CAR RHAB W/ECG: CPT

## 2021-01-22 ENCOUNTER — HOSPITAL ENCOUNTER (OUTPATIENT)
Dept: CARDIAC REHAB | Age: 66
Setting detail: THERAPIES SERIES
Discharge: HOME | End: 2021-01-22
Payer: COMMERCIAL

## 2021-01-22 PROCEDURE — 93798 PHYS/QHP OP CAR RHAB W/ECG: CPT

## 2021-01-25 ENCOUNTER — HOSPITAL ENCOUNTER (OUTPATIENT)
Dept: CARDIAC REHAB | Age: 66
Setting detail: THERAPIES SERIES
Discharge: HOME | End: 2021-01-25
Payer: COMMERCIAL

## 2021-01-25 PROCEDURE — 93798 PHYS/QHP OP CAR RHAB W/ECG: CPT

## 2021-01-25 NOTE — PROGRESS NOTES
[facility]  Phase 2 Cardiac Rehab Weekly Progress Report      Patient prescribed exercise:  12:00 class. 3 times per week in rehab, 1-4 times per week at home for the amount of sessions/weeks specified by insurance. Current Levels: Treadmill: 3. 3mph/2% for 15 minutes, Schwinn Airdyne: Level 1.6 for 15 minutes,  UBE: Level 0.8 for 5 minutes. Progression Discussion: Maintain/Increase Aerobic exercise 35 minutes to work on endurance. Attempt to increase intensity by 5-20% for each modality this week. Try to increase intensities until [place] rates the exercises a 13-17 on Deisy RPE.

## 2021-01-27 ENCOUNTER — HOSPITAL ENCOUNTER (OUTPATIENT)
Dept: CARDIAC REHAB | Age: 66
Setting detail: THERAPIES SERIES
Discharge: HOME | End: 2021-01-27
Payer: COMMERCIAL

## 2021-01-27 PROCEDURE — 93798 PHYS/QHP OP CAR RHAB W/ECG: CPT

## 2021-01-29 ENCOUNTER — HOSPITAL ENCOUNTER (OUTPATIENT)
Dept: CARDIAC REHAB | Age: 66
Setting detail: THERAPIES SERIES
Discharge: HOME | End: 2021-01-29
Payer: COMMERCIAL

## 2021-01-29 PROCEDURE — 93798 PHYS/QHP OP CAR RHAB W/ECG: CPT

## 2021-02-01 ENCOUNTER — APPOINTMENT (OUTPATIENT)
Dept: CARDIAC REHAB | Age: 66
End: 2021-02-01
Payer: COMMERCIAL

## 2021-02-01 ENCOUNTER — HOSPITAL ENCOUNTER (OUTPATIENT)
Dept: CARDIAC REHAB | Age: 66
Setting detail: THERAPIES SERIES
Discharge: HOME | End: 2021-02-01
Payer: COMMERCIAL

## 2021-02-01 PROCEDURE — 93798 PHYS/QHP OP CAR RHAB W/ECG: CPT

## 2021-02-01 PROCEDURE — 93797 PHYS/QHP OP CAR RHAB WO ECG: CPT

## 2021-02-01 NOTE — PROGRESS NOTES
[facility] Program  Phase 2 Cardiac Rehab Weekly Progress Report      Patient prescribed exercise:  12:00 class. 3 times per week in rehab, 1-4 times per week at home for the amount of sessions/weeks specified by insurance. Current Levels: Treadmill: 3.5mph/2% for 20 minutes, [employer] Airdyne: 78watts for 8 minutes,  UBE: Level 0.8 for 5 minutes. Progression Discussion: Maintain/Increase Aerobic exercise 33 minutes to work on endurance. Attempt to increase intensity by 5-20% for each modality this week. Try to increase intensities until [place] rates the exercises a 13-17 on Deisy RPE.

## 2021-02-01 NOTE — PROGRESS NOTES
Video Education Report - ICR/CR  Name:  Pb Austin     Date:  2/1/2021  MRN: [id number]     Session #:    Session Length: 40 min    Core Videos        []Heart Disease Risk Reduction       [x]Overview of Pritikin Eating Plan      []Move it          []Calorie Density         []Healthy Minds, Bodies, Hearts        []Label Reading - Part 1       []Metabolic Syndrome and Belly Fat        []How Our Thoughts Can Heal Our Hearts   []Dining Out - Part 1      []Biomechanical Limitations  []Facts on Fat        []Hypertension & Heart Disease    []Diseases of Our Time - Focusing on Diabetes   []Body Composition  []Nutrition Action Plan    []Exercise Action Plan    Comments:  Video completed, group discussion

## 2021-02-03 ENCOUNTER — HOSPITAL ENCOUNTER (OUTPATIENT)
Dept: CARDIAC REHAB | Age: 66
Setting detail: THERAPIES SERIES
Discharge: HOME | End: 2021-02-03
Payer: COMMERCIAL

## 2021-02-03 ENCOUNTER — APPOINTMENT (OUTPATIENT)
Dept: CARDIAC REHAB | Age: 66
End: 2021-02-03
Payer: COMMERCIAL

## 2021-02-03 PROCEDURE — 93798 PHYS/QHP OP CAR RHAB W/ECG: CPT

## 2021-02-03 NOTE — PROGRESS NOTES
[facility]  Individualized Cardiac Treatment Plan    Patient Name:  Kalyn Jaime  :  1955  Age:  63 y.o. MRN: [id number]  Diagnosis: STEMI/PCI to LCX  Date of Event: 10/22/2020   Physician:  Dr. Juliet Rios  Next Office Visit:  2021  Date Entered Program: 20  Risk Stratifications: [] Low [] Intermediate [x] High  Allergies: No Known Allergies    COVID -19 Screen  Do you have any of the following symptoms:  [] Fever [] Cough [] SOB [] Muscle/Body Ache [] Loss of taste/smell [x] None    Have you traveled outside of the [place]? [] Yes      [x] No    Have you been around anyone who has tested Positive for COVID 19?  [] Yes      [x] No    The following Education has been completed with patient. [x] Wait in the lobby until we call you back to rehab. [x] You must wear a mask while in the medical center, and in cardiac rehab. Please bring your own. [x] Bring your own bottle of water with you. [x] You can bring a visitor with you, however, they will need to sit in the waiting room while you are in cardiac rehab.     Individual Cardiac Treatment Plan -EXERCISE  INITIAL 30 DAY 60 DAY 90 DAY FINAL DAY   EXERCISE  ASSESSMENT/PLAN EXERCISE  REASSESSMENT EXERCISE   REASSESSMENT EXERCISE   REASSESSMENT EXERCISE  DISCHARGE/FOLLOW-UP   Date: 2020 Date: 20 Date: 21    Pt is currently on hold due to testing positive for Covid 19   Date: 2/3/2021   Date:   Session #1 Session #11 Session # Session #:  34 Session #   Stages of Change Stages of Change Stages of Change Stages of Change Stages of Change   [] Pre Contemplation  [] Contemplation  [x] Preparation  [] Action  [] Maintenance  [] Relapse [] Pre Contemplation  [] Contemplation  [x] Preparation  [] Action  [] Maintenance  [] Relapse [] Pre Contemplation  [] Contemplation  [] Preparation  [x] Action  [] Maintenance  [] Relapse [] Pre Contemplation  [] Contemplation  [] Preparation  [] Action  [] Maintenance  [] Relapse [] Pre Contemplation  [] Contemplation  [] Preparation  [] Action  [] Maintenance  [] Relapse   EXERCISE ASSESSMENT EXERCISE ASSESSMENT EXERCISE ASSESSMENT EXERCISE ASSESSMENT EXERCISE ASSESSMENT   6 Min Walk Test  Distance walked:   0.33 miles  1742 ft  3.53 METs  Max HR:88 BPM     RPE:  13    THR:  103-121  Rhythm:  NSR    6 Min Walk Test  Distance walked:   ** miles  ** ft  ** METs  Max HR:** BPM      RPE:  **    %Changeft = **  Rhythm:  **   DASI: 6.70 METs DASI: 8.97 METs DASI:  METs DASI: 9.89 METs DASI: ** METs   Return to Work  Lane on returning to work? [x] Yes              [] No   [] Disabled     [] Retired    If yes:  *Job description: [employer]   Return to work: Has Paul Contrerasft returned to work? [x] Yes    [] No    .     Return to work: Has Juliaette Croft returned to work? [x] Yes    [] No      Return to work: Has Juliaette Croft returned to work? [x] Yes    [] No     Return to work: Has Juliaette Croft returned to work? [] Yes    [] No    Return to work date set? [] Yes, **    [] No    *Required MET Level achieved for job duties? [] Yes    [] No   Orthopedic Limitations/  [] Yes    [x] No       Orthopedic Limitations  *If patient has orthopedic issue:   Actions/  accomodations needed to make Julalbertte Croft successful : na Orthopedic Limitations  na Orthopedic Limitations  na   Orthopedic Limitations     Fall Risk  Fall risk assessed? [x] Yes      [] No    Balance Issues?   [] Yes      [x] No     [] Walker [] Cane    [x] Safety issues reviewed      Fall Risk  *If patient is a fall risk, action needed to accommodate: na Fall Risk  na Fall Risk  na Fall Risk   Home Exercise  [] Yes    [x] No   Home Exercise  [] Yes    [x] No  Type: **  Frequency:**  Duration: ** Home Exercise  [] Yes    [] No   Home Exercise  [] Yes    [x] No  Does play disc golf when weather is appropriate Home Exercise  [] Yes    [] No  Type: **  Frequency: **  Duration: **   Angina with Activity? [] Yes    [x] No   Angina with Activity? [] Yes    [x] No  Angina Management: na Angina with Activity? [] Yes    [] No   Angina with Activity? [] Yes    [x] No  Angina Management: ** Angina with Activity?   [] Yes    [] No  Angina Management: **   EXERCISE PLAN EXERCISE PLAN EXERCISE PLAN EXERCISE PLAN EXERCISE PLAN   *Interventions* *Interventions* *Interventions* *Interventions* *Interventions*   Exercise Prescription  (per physician & CR staff) Exercise Prescription  (per physician & CR staff) Exercise Prescription  (per physician & CR staff) Exercise Prescription  (per physician & CR staff) Exercise Prescription  (per physician & CR staff)   Cardiovascular Cardiovascular Cardiovascular Cardiovascular Cardiovascular   Mode:    [x] Treadmill (TM)  [x] Schwinn Airdyne (AD)  [x] Arms Ergometer (AE)  [] NuStep  [] Elliptical (E) MODE:    [x] Treadmill (TM)  [x] Schwinn Airdyne (AD)  [x] Arms Ergometer (AE)  [] NuStep  [] Elliptical (E) MODE:    [x] Treadmill (TM)  [x] Schwinn Airdyne (AD)  [x] Arms Ergometer (AE)  [] NuStep  [] Elliptical (E) MODE:    [x] Treadmill (TM)  [x] Schwinn Airdyne (AD)  [x] Arms Ergometer (AE)  [] NuStep  [] Elliptical (E) MODE:    [] Treadmill (TM)  [] Schwinn Airdyne (AD)  [] Arms Ergometer (AE)  [] NuStep  [] Elliptical (E)   Initial Workloads  TM: [email or web address] 3.6 METs  AD: 1.6 level = 3.5 METs  NS: 104  [place]cl[place]= 3.5 METs  AE: 0.8 level = 2.2 METs Current Workloads  TM: 3.2@ 1%= 3.9 METs  AD: 70Watts  = 4.3 METs    AE: 58 mcclure = 2.2 METs Current Workloads  TM: [email or web address] %=4.5  METs  AD: 76Watts = 4.5 METs    AE: 60 Mccl[place] = 2.2 METs Current Workloads  TM:3.5 @ 3%= 5.2 METs  AD: 1.6 level = 3.4 METs  AE: 0.8 level = 2.2 METs Current Workloads  TM: @ %=  METs  AD:  level =  METs  NS:  [place]=  METs  AE:  level =  METs   Frequency:    ICR: 3x/week  Home: 2-3x/wk Frequency:   ICR: 3x/week  Home: 3x/wk Frequency:  ICR: 3x/week  Home: 3-4x/wk Frequency:  ICR: 3x/week  Home: 3-4x/wk Frequency:  Jose Bruce will continue exercise at  5-7 days/week   Duration:   Total aerobic exercise = 30-45 min    8 min/mode Duration:  Total aerobic exercises = 35 min     15 min/mode Duration:  Total aerobic exercises = 35 min     10-20min/mode Duration:  Total aerobic exercises = 30-45 min     15min/mode Duration:  Total erobic exercise =  60-90 min   Intensity:   MET Level = 3.6  RPE = 12-15 Intensity:  Max MET Level = 4.3  RPE = 12-15 Intensity:  Max MET Level = 4.5  RPE = 12-15 Intensity:  Max MET Level = 5.2  RPE = 12-15 Intensity:  Max MET Level = ** RPE = 12-15   Progression: increase aerobic activity up to 15 min over next 4 weeks by increasing time 2-3 min/week. Progression: Increase intensity 5-10% over the next 4 weeks as tolerated   Progression:  Increase intensity 5-10% over the next 4 weeks as tolerated Progression:  Progressing well. Increase workloads weekly as tolerated. Progression:  Increase time/intensity when RPE <13, and HR is in [facility]   [x] Yes      [] No  Upper and Lower body strength training 2x/wk    Wt: 2#       Reps:  8-15    *Increase wt. after completing 15 reps with an RPE of <12/13. [x] Yes      [] No  Upper and Lower body strength training 2x/wk    Wt: 5#       Reps:  8-15    *Increase wt. after completing 15 reps with an RPE of <12/13. [x] Yes      [] No  Upper and Lower body strength training 2x/wk    Wt: 7#       Reps:  8-15    *Increase wt. after completing 15 reps with an RPE of <12/13. [x] Yes      [] No  Upper and Lower body strength training 2x/wk    Wt: 8#       Reps:  8-15    *Increase wt. after completing 15 reps with an RPE of <12/13. Continue Strength Training at home   [] Exercise Log & Strength training handout given     Wt: **#       Reps:  8-15    *Increase wt. after completing 15 reps with an RPE of <12/13.    Flexibility Flexibility Flexibility Flexibility Flexibility   [x] Yes      [] No  25 min session of Core Strength & Flexibility 1x/per week  Attends Core Strength & Flexibility   [x] Yes      [] No Attends Core Strength & Flexibility   [x] Yes      [] No Attends Core Strength & Flexibility   [x] Yes      [] No Continue Core Strength & Flexibility at home   Home Exercise  *Milton verbalizes planning to initiate home walking 3-4 days/week for 20-30 min on days not in rehab. Home Exercise  *Milton verbalizes planning to walk 3-4 days/week for 30- min on days not in rehab. Home Exercise  *Milton verbalizes planning to walk 3-4days/week for 30-40 min on days not in rehab. Home Exercise  *Milton verbalizes planning to get out and exercise more when weather is better. Encouraged to walk indoors somewhere. Home Exercise  *Milton verbalizes his/her plan to ** ** days/week for ** min @ **   *Education* *Education* *Education* *Education* *Education*   RPE Scale  [x] Yes      [] No  Exercise Safety  [x] Yes      [] No  Equipment Orientation  [x] Yes      [] No  S/S to Report  [x] Yes      [] No  Warm Up/Cool Down  [x] Yes      [] No  Home Exercise  [x] Yes      [] No    All Exercise Education Completed  [] Yes      [] No   *Goals* *Goals* *Goals* *Goals* *Goals*   Milton plans to:  [x] Attend exercise sessions 3x/wk  [x] initiate home exercise 2-3x/wk for 10-20 min  [x] Increase 6 min walk distance by 10%  [] ** Milton plans to:  [x] Attend exercise sessions 3x/wk  [x] continue home exercise 2-3x/wk for 20-30 min  [] ** Milton's plans to:  [x] Attend exercise sessions 3x/wk  [x] continue home exercise 2-3x/wk for 30-45 min  [x] Determine plan of exercise following rehab  [] ** Milton plans to:  [x] Attend exercise sessions 3x/wk  [x] continue home exercise 2-3x/wk for 20-30 min  [] ** Milton achieved exercise goals?    Yes    [] No  If no, why?  **  [] Increased 6 min walk distance by 10%  [] Currently exercising 30-60 min/day, 5-7days/wk   [] Plans to continue exercise on own  [] Plans to join a local fitness center to continue exercise  [] Does not plan to continue to exercise after rehab   Return to ADL or Hobbies:  Maria Fernanda Amin would like to improve strength and endurance so he is able to return to hunt and play disc golf Return to ADL or Hobbies:  Maria Fernanda Aimn would like to improve strength and endurance so he is able to return to hunt and play disc golf Return to ADL or Hobbies:  Maria Fernanda Amin would like to improve strength and endurance so he is able to return to all previous activites Return to ADL or Hobbies:  Maria Fernanda Amin has resumed all previous activities Return to ADL or Hobbies:  Maria Fernanda Amin would like to improve strength and endurance so he/she is able to return to **    *MET level required for above goal:  3.0-5.0 METs MET level Achieved:  4. 3METs MET level Achieved:  4.5METs MET level Achieved:  5. 2METs MET level Achieved:  **METs     Individual Cardiac Treatment Plan - Nutrition  NUTRITION  ASSESSMENT/PLAN NUTRITION  REASSESSMENT NUTRITION   REASSESSMENT NUTRITION   REASSESSMENT NUTRITION  DISCHARGE/FOLLOW-UP   Stages of Change Stages of Change Stages of Change Stages of Change Stages of Change   [] Pre Contemplation  [] Contemplation  [x] Preparation  [] Action  [] Maintenance  [] Relapse [x] Pre Contemplation  [] Contemplation  [] Preparation  [] Action  [] Maintenance  [] Relapse [] Pre Contemplation  [] Contemplation  [] Preparation  [] Action  [] Maintenance  [] Relapse [] Pre Contemplation  [] Contemplation  [x] Preparation  [] Action  [] Maintenance  [] Relapse [] Pre Contemplation  [] Contemplation  [] Preparation  [] Action  [] Maintenance  [] Relapse   NUTRITION ASSESSMENT NUTRITION ASSESSMENT NUTRITION ASSESSMENT NUTRITION ASSESSMENT NUTRITION ASSESSMENT   Weight Management  Weight: 205.8        Height: 5'9   BMI: 30.5  Weight Management  Weight: 206.6                  Weight Management  Weight: 206.4                 Weight Management  Weight: 204 Weight Management  Weight: **                  BMI: **   Eating Plan  Current eating habits: more fruits and veggies Eating Plan  Changes: none reported Eating Plan  Changes: Eating Plan  Changes: none Eating Plan Improvements:   Alcohol Use  [] none          [] daily  [] weekly      [x] special          Diet Assessment Tool:  RATE YOUR PLATE  *Given to patient to complete and return. Diet Assessment Tool:    Score: 43/69      Diet Assessment Tool: RATE YOUR PLATE  Score: **/10   NUTRITION PLAN NUTRITION PLAN NUTRITION PLAN NUTRITION PLAN NUTRITION PLAN   *Interventions* *Interventions* *Interventions* *Interventions* *Interventions*   Initial Survey given Goal Setting Discussion:   [x] Yes      [] No       Follow Up Survey Reviewed & Goals Updated:     Professional Referral  Please check if needed. []Dietician Consult   []Wt. Management Referral  []Other:  Professional Referral  Please check if needed. []Dietician Consult   []Wt. Management Referral  []Other:  Professional Referral  Please check if needed. []Dietician Consult   []Wt. Management Referral  []Other:  Professional Referral  Please check if needed. []Dietician Consult   []Wt. Management Referral  []Other: Professional Referral  Please check if needed. []Dietician Consult   []Wt. Management Referral  []Other:    *Education* *Education* *Education* *Education* *Education*   Nutritional Education Recommended    [x] Overview of [employer] Eating Plan video Nutritional Education Attended/Date    See Education Videos under Risk Factors Nutritional Education Attended/Date    See Education Videos under Risk Factors Nutritional Education Attended/Date    See Education Videos under Risk Factors Video Completed?     [] Yes  [] No   *Goals* *Goals* *Goals* *Goals* *Goals*   Amie nutritional goals are as follows:  Complete and return diet survey [x] Completed Video  [x] Complete and return diet survey [x] Complete Video  [x] Complete and return diet survey Nanette Yonatan achieved nutritional goals   [] Yes    [] No  If no, why? Individual Cardiac Treatment Plan - Psychosocial  PSYCHOSOCIAL  ASSESSMENT/PLAN PSYCHOSOCIAL  REASSESSMENT PSYCHOSOCIAL   REASSESSMENT PSYCHOSOCIAL   REASSESSMENT PSYCHOSOCIAL  DISCHARGE/FOLLOW-UP   Stages of Change Stages of Change Stages of Change Stages of Change Stages of Change   [] Pre Contemplation  [] Contemplation  [] Preparation  [] Action  [x] Maintenance  [] Relapse [] Pre Contemplation  [] Contemplation  [] Preparation  [] Action  [x] Maintenance  [] Relapse [] Pre Contemplation  [] Contemplation  [] Preparation  [] Action  [x] Maintenance  [] Relapse [] Pre Contemplation  [] Contemplation  [] Preparation  [] Action  [] Maintenance  [] Relapse [] Pre Contemplation  [] Contemplation  [] Preparation  [] Action  [] Maintenance  [] Relapse   PSYCHOSOCIAL ASSESSMENT PSYCHOSOCIAL ASSESSMENT PSYCHOSOCIAL ASSESSMENT PSYCHOSOCIAL ASSESSMENT PSYCHOSOCIAL ASSESSMENT   Behavioral Outcomes Behavioral Outcomes Behavioral Outcomes Behavioral Outcomes Behavioral Outcomes   Tool Used:  Ferrans & Alexander, Quality of Life Index, Cardiac Version IV  *Given to patient to complete. Tool Used:    Ferrans & Munoz, Quality of Life Index, Cardiac Version IV   QOL Index Score: 27.88    HF:26.5  S&E:29.06  P&S: 28.29  Family: 30   Tool Used:     Ferrans & Munoz, Quality of Life Index, Cardiac Version IV  QOL Index Score: **    HF:**  S&E:**  P&S: **  Family: **   PHQ-9 score 0  Depression Severity  [x]Minimal  []Mild   []Moderate  []Moderately Severe  []Severe    PHQ-9 score **  Depression Severity  []Minimal  []Mild   []Moderate  []Moderately Severe   []Severe   Does patient have Family Support? [x] Yes      [] No  No signs of marital/family distress       Within the Past Month:  *Have you wished you were dead or wished you could go to sleep and not wake up? [] Yes      [x] No  *Have you had any thoughts of killing yourself?   [] Yes      [x] No         Using *Please check if needed  [] Psych Consult  [] Physician Referral  [x] Stress Management Skills *Please check if needed  [] Psych Consult  [] Physician Referral  [] Stress Management Skills   Is patient currently taking anti-depressant or anti-anxiety medications? [x] Yes      [] No  If yes, please list medications:  doxepin Change in anti-depressant or anti-anxiety medications? [] Yes      [x] No   Change in anti-depressant or anti-anxiety medications? [] Yes      [x] No   Change in anti-depressant or anti-anxiety medications? [] Yes      [x] No  If yes, please list medications:  ** Change in anti-depressant or anti-anxiety medications? [] Yes      [] No  If yes, please list medications:  **   *Education* *Education* *Education* *Education* *Education*   Healthy Mind Education Recommended    [x] Healthy Minds, Bodies,Hearts video See Education Videos under Risk Factor See Education Videos under Risk Factor See Education Videos under Risk Factor Video Completed? [] Yes  [] No   *Goals* *Goals* *Goals* *Goals* *Goals*   Milton's psychosocial goals are as follows:  Complete HADS & Sharad & Alexander, Quality of Life Index, Cardiac Version IV [x] Reduce depression symptom severity by 1 level [x] Reduce depression symptom severity by 1 level [] Reduce depression symptom severity by 1 level Milton achieved psychosocial goals?   [] Yes    [] No  If no, why?  **  [] Use methods learned to continue to reduce depression symptom severity by 1 level     Individual Cardiac Treatment Plan - Other:  Risk Factor/Education  RISK FACTOR  ASSESSMENT/PLAN RISK FACTOR  REASSESSMENT  RISK FACTOR  REASSESSMENT RISK FACTOR  REASSESSMENT RISK FACTOR   DISCHARGE/FOLLOW-UP   Stages of Change Stages of Change Stages of Change Stages of Change Stages of Change   [] Pre Contemplation  [] Contemplation  [x] Preparation  [] Action  [] Maintenance  [] Relapse [] Pre Contemplation  [x] Contemplation  [] Preparation  [] Action  [] Maintenance  [] Relapse [] Pre Contemplation  [] Contemplation  [x] Preparation  [] Action  [] Maintenance  [] Relapse [] Pre Contemplation  [] Contemplation  [x] Preparation  [] Action  [] Maintenance  [] Relapse [] Pre Contemplation  [] Contemplation  [] Preparation  [] Action  [] Maintenance  [] Relapse   RISK FACTOR/EDUCATION ASSESSMENT RISK FACTOR/EDUCATION ASSESSMENT RISK FACTOR/EDUCATION ASSESSMENT RISK FACTOR/EDUCATION ASSESSMENT RISK FACTOR /EDUCATION ASSESSMENT   Hypertension  [x] Yes      [] No    Resting BP: 122/80  Peak Ex BP:140/76  Medication: lisinopril, carvedilol   Hypertension  Resting BP: 112/72  Peak Ex BP:144/72  Medication Changes:  [] Yes      [x] No Hypertension  Resting BP: 110/68    Peak Ex BP:162/74    Medication Changes:  [] Yes      [x] No Hypertension  Resting BP: 120/84  Peak Ex BP:162/80  Medication Changes:  [] Yes      [x] No Hypertension  Resting BP: **  Peak Ex BP:**  Medication Changes:  [] Yes      [] No   Lipids  HLD/DLD  [x] Yes      [] No  TOTAL CHOL: 200  HDL:  38  LDL:  125  TRI  Medication: atorvastatin Lipids  Medication Changes:  [] Yes      [x] No     Lipids  Medication Changes:  [] Yes      [x] No     Lipids  Medication Changes:  [] Yes      [x] No     Lipids    TOTAL CHOL: **  HDL:  **  LDL:  **  TRIG:  **  Medication Changes:  [] Yes      [] No   Diabetes  [] Yes      [x] No  FBS: 117            Diabetes  Most Recent BS:  BS have been in range  [] Yes      [x] No  Medication Changes  [] Yes      [x] No   Diabetes  na     Diabetes  M   Diabetes  Most Recent BS:  BS have been in range  [] Yes      [] No  Medication Changes  [] Yes      [] No       Tobacco Use  [x] Current  [] Former  [] Never    Smokeless Tobacco use:   [] Yes      [x] No   Tobacco Use  Change in smoking status   [] Yes      [x] No    Smoking 4-5 cig per day   Tobacco Use  Change in smoking status   [] Yes      [x] No       Tobacco Use  Change in smoking status   [] Yes      [x] No    Smoking 4-5 per day Tobacco Use  Change in smoking status   [] Yes      [] No    Quit date: **            Learning Barriers  Please select one:  []Speech  []Literacy  []Hearing  []Cognitive  []Vision  [x]Ready to Learn Learning Barriers Addressed:   [] Yes      [] No  na Learning Barriers Addressed:   [] Yes      [x] No   Learning Barriers Addressed:  [] Yes      [x] No Learning Barriers Addressed:  [] Yes      [] No     RISK FACTOR/EDUCATION PLAN RISK FACTOR/EDUCATION PLAN RISK FACTOR/EDUCATION PLAN RISK FACTOR/EDUCATION PLAN RISK FACTOR/EDUCATION PLAN   *Interventions* *Interventions* *Interventions* *Interventions* *Interventions*   Recommended Educational Videos    [x] Heart Disease Risk Reduction  [x] Overview of The [employer] Eating Plan  [x] Move It! [x] Healthy Minds, Bodies, Hearts       Completed Videos      11/16/20  Heart Disease Risk Reduction Completed Videos Completed Videos      2/1/21  Overview of The [employer] Eating Plan Recommended Educational Videos Completed    [] Yes      [] No    **If not completed, Why? **          Smoking Cessation/Relaspe Prevention Intervention needed? [] Yes      [x] No   Smoking Cessation/Relaspe Prevention Intervention needed? [x] Yes      Smoking Counseling attended  [] Yes      [] No  **If not completed, Why? **   Professional Referrals:  *Please check if needed  [] Diabetes Clinic  [] Lipid Clinic   [] Other:     Professional Referrals:  *Please check if needed  [] Diabetes Clinic  [] Lipid Clinic   [] Other:   Preventative Medication Preventative Medication Preventative Medication Preventative Medication Preventative Medication   Aspirin  [x] Yes    [] No  Blood Thinner: Clopidogrel/Effient/Brillinta  [x] Yes    [] No  Beta Blocker  [x] Yes    [] No  Ace Inhibitor  [x] Yes    [] No  Statin/Lipid Lowering  [x] Yes    [] No Medication Changes? [] Yes    [x] No Medication Changes? [] Yes    [x] No Medication Changes? [] Yes    [x] No Medication Changes?   [] Yes    [] No *Education* *Education* *Education* *Education* *Education*   Does Ana Msusana Covarrubiasors require any additional education? [] Yes    [x] No   Does Ana M Paz require any additional education? [x] Yes    [] No Does Ana M Paz require any additional education? [] Yes    [] No Does Ana M Paz require any additional education? [] Yes    [x] No Does Ana M Paz require any additional education?   [] Yes    [] No   Recommended Additional Educational Videos    Medical  [] Diseases of Our Time-Part 1  [] Diseases of Our Times-Part 2  [] Metabolic Syndrome & Belly Fat  [] Hypertension & Heart Disease  [] Decoding Lab Results  [] Aging Enhancing Your QoL  [] Sleep Disorders  Exercise  [] Biomechanical Limitations  [] Body Composition  [] Improve Performance  [] Exercise Action Plan  [] Intro to Yoga  Behavioral  [] How Our Thoughts Can Heal Our Hearts  [] Smoking Cessation  Nutrition  [] Becoming A Pritikin   [] Calorie Density  [] Label Reading-Part 1  [] Facts on Fat  [] Biology of Weight Control  [] Nurtition Action Plan  [] Planning Your Eating Strategy  [] Lable Reading- Part 2  [] Dining Out-Part 1  [] Dining Out - Part 2  [] Targeting Your Nutrition Priorities  [] Fueling a Healthy Body  [] Menu Workshop  [employer]   [] Breakfast & Snacks  [] [employer] Salads & Dressing  [] [address] [place] [address]  [] Soups & Desserts Additional Educational Videos Completed           Additional Educational Videos Completed Additional Educational Videos Completed Additional Educational Videos Completed    [] Yes    [] No   *Goals* *Goals* *Goals* *Goals* *Goals*   Milton's risk factor/education goals are as follows:    [x] Optimal BP <140/90  [] Blood Sugar <120  [x] Attend recommended video education sessions  [x] Takes medications as prescribed 100% of the time   [] **   Milton's risk factor/education goals are as follows:    [x] Optimal BP <140/90  [] Blood Sugar <120  [x] Attend recommended video education sessions  [x] Takes medications as prescribed 100% of the time   [] ** Milton's risk factor/education goals are as follows:    [x] Optimal BP <140/90  [] Blood Sugar <120  [x] Attend recommended video education sessions  [x] Takes medications as prescribed 100% of the time   [] ** Milton's risk factor/education goals are as follows:    [x] Optimal BP <140/90  [] Blood Sugar <120  [x] Attend recommended video education sessions  [x] Takes medications as prescribed 100% of the time   [] ** Milton achieved risk factor goals?   [] Yes    [] No  If no, why?  **       Monitored telemetry has revealed nsr  [] documented arrhythmia at increasing workloads  [] associated symptoms  Monitored telemetry has revealed NSR[]  Monitored telemetry has revealed NSR   Monitored telemetry has revealed NSR  [] documented arrhythmia at increasing workloads  [] associated symptoms ** Monitored telemetry has revealed **  [] documented arrhythmia at increasing workloads  [] associated symptoms **   Physician Response    [x] Cardiac rehab is reasonably and medically necessary for continuous cardiac monitoring surveillance  of patient's cardiac activity  [x] Initiate continuous telemerty monitoring and notify me with any concerns  [] Other   Physician Response    [x] Cardiac rehab is reasonably and medically necessary for continuous cardiac monitoring surveillance  of patient's cardiac activity  [x] Continue continuous telemerty monitoring and notify me with any concerns  [] Other     Physician Response      [x] Cardiac rehab is reasonably and medically necessary for continuous cardiac monitoring surveillance  of patient's cardiac activity  [x] Continue continuous telemerty monitoring and notify me with any concerns   [] Other     Physician Response    [x] Cardiac rehab is reasonably and medically necessary for continuous cardiac monitoring surveillance  of patient's cardiac activity  [x] Continue continuous telemerty monitoring and notify me with any concerns   [] Other Cosigned by:  Burgess Sergei MD at 11/13/2020 10:24 AM    Revision History     Date/Time  User  Provider Type  Action    11/13/2020 10:24 AM  Burgess Sergei MD  Physician  Cosign    11/12/2020  1:26 PM  Good Balderas  Exercise Physiologist  Sign      Cosigned by: Burgess Sergei MD at 12/10/2020  8:11 AM   Revision History  Date/Time User Provider Type Action   12/10/2020  8:11 AM Burgess Sergei MD Physician Cosign   12/9/2020 12:34 PM [facility] [address] Exercise Physiologist Sign        Cosigned by: Burgess Sergei MD at 1/7/2021  8:01 AM   Revision History  Date/Time User Provider Type Action   1/7/2021  8:01 AM Burgess Sergei MD Physician Cosign   1/6/2021 10:01 AM [facility] [address] Exercise Physiologist Sign

## 2021-02-05 ENCOUNTER — HOSPITAL ENCOUNTER (OUTPATIENT)
Dept: CARDIAC REHAB | Age: 66
Setting detail: THERAPIES SERIES
Discharge: HOME | End: 2021-02-05
Payer: COMMERCIAL

## 2021-02-05 PROCEDURE — 93798 PHYS/QHP OP CAR RHAB W/ECG: CPT

## 2021-02-08 ENCOUNTER — HOSPITAL ENCOUNTER (OUTPATIENT)
Dept: CARDIAC REHAB | Age: 66
Setting detail: THERAPIES SERIES
Discharge: HOME | End: 2021-02-08
Payer: COMMERCIAL

## 2021-02-08 PROCEDURE — 93798 PHYS/QHP OP CAR RHAB W/ECG: CPT

## 2021-02-08 NOTE — PROGRESS NOTES
Hospital Facility-Based Program  Phase 2 Cardiac Rehab Weekly Progress Report      Patient prescribed exercise:  12:00 class. 3 times per week in rehab, 1-4 times per week at home for the amount of sessions/weeks specified by insurance. Current Levels: Treadmill: 3.5mph/3% for 30 minutes, Schwinn Airdyne: Level 1.6 for 12 minutes,  UBE: Level 0.8 for 5 minutes. Progression Discussion: Maintain/Increase Aerobic exercise 15-30 minutes to work on endurance. Attempt to increase intensity by 5-20% for each modality this week. Try to increase intensities until Paul Soriano rates the exercises a 13-17 on Deisy RPE.

## 2021-02-10 ENCOUNTER — HOSPITAL ENCOUNTER (OUTPATIENT)
Dept: CARDIAC REHAB | Age: 66
Setting detail: THERAPIES SERIES
Discharge: HOME | End: 2021-02-10
Payer: COMMERCIAL

## 2021-02-10 PROCEDURE — 93798 PHYS/QHP OP CAR RHAB W/ECG: CPT

## 2021-02-11 ENCOUNTER — HOSPITAL ENCOUNTER (OUTPATIENT)
Dept: NON INVASIVE DIAGNOSTICS | Age: 66
Discharge: HOME | End: 2021-02-11
Payer: COMMERCIAL

## 2021-02-11 DIAGNOSIS — I21.3 ST ELEVATION MYOCARDIAL INFARCTION (STEMI), UNSPECIFIED ARTERY (HCC): ICD-10-CM

## 2021-02-11 LAB
LV EF: 58 %
LVEF MODALITY: NORMAL

## 2021-02-11 PROCEDURE — 93306 TTE W/DOPPLER COMPLETE: CPT

## 2021-02-12 ENCOUNTER — OFFICE VISIT (OUTPATIENT)
Dept: CARDIOLOGY CLINIC | Age: 66
End: 2021-02-12
Payer: COMMERCIAL

## 2021-02-12 ENCOUNTER — HOSPITAL ENCOUNTER (OUTPATIENT)
Dept: CARDIAC REHAB | Age: 66
Setting detail: THERAPIES SERIES
Discharge: HOME | End: 2021-02-12
Payer: COMMERCIAL

## 2021-02-12 VITALS
HEART RATE: 98 BPM | HEIGHT: 69 IN | BODY MASS INDEX: 30.36 KG/M2 | WEIGHT: 205 LBS | SYSTOLIC BLOOD PRESSURE: 126 MMHG | DIASTOLIC BLOOD PRESSURE: 62 MMHG

## 2021-02-12 DIAGNOSIS — I25.83 CORONARY ARTERY DISEASE DUE TO LIPID RICH PLAQUE: Primary | ICD-10-CM

## 2021-02-12 DIAGNOSIS — I21.3 ST ELEVATION MYOCARDIAL INFARCTION (STEMI), UNSPECIFIED ARTERY (HCC): ICD-10-CM

## 2021-02-12 DIAGNOSIS — I25.10 CORONARY ARTERY DISEASE DUE TO LIPID RICH PLAQUE: Primary | ICD-10-CM

## 2021-02-12 PROCEDURE — 99213 OFFICE O/P EST LOW 20 MIN: CPT | Performed by: INTERNAL MEDICINE

## 2021-02-12 PROCEDURE — 93798 PHYS/QHP OP CAR RHAB W/ECG: CPT

## 2021-02-12 RX ORDER — EZETIMIBE 10 MG/1
10 TABLET ORAL DAILY
COMMUNITY
Start: 2021-01-09

## 2021-02-12 NOTE — PROCEDURES
[address] [place] [address]                            CARDIAC CATHETERIZATION    PATIENT NAME: Uziel Baldwin               :        1955  MED REC NO:   [id number]                           ROOM:       3323  ACCOUNT NO:   [de-identified]                           ADMIT DATE: 10/22/2020  PROVIDER:     Dee Nagel MD    DATE OF PROCEDURE:  10/23/2020    PROCEDURES PERFORMED:  Coronary angiogram, PCI of LAD. INDICATION FOR STUDY:  Staged PCI for CAD, recent ST-segment elevation  MI.    DESCRIPTION OF PROCEDURE:  After informed consent was obtained, the  patient was  brought to the cardiac catheterization laboratory in a  fasting, nonsedated state. Preprocedure timeout was performed. 2%  lidocaine was used to anesthetize  the subcutaneous tissue overlying the  right radial artery. Using modified Seldinger technique, a 6-Anguillan  Slender arterial sheath was placed in the right radial artery. Once the  sheath was in place, a radial cocktail was given. Estimated blood loss was less than 20 mL. IV heparin was given to maintain ACT of above 250 seconds. A 6-Anguillan EBU 3.5 guide catheter was used to engage the left main. A  Runthrough wire was used to cross the critical stenosis in the LAD, and  tip of the wire was placed in the distal portion of the vessel. We  first used a 3.0 x 20 compliant balloon to predilate as well as measure  the lesion in the proximal to mid portion of the LAD. Once adequate  predilation was performed, we then placed a 3.5 x 33 mm Xience Asmita  drug-eluting stent from normal-to-normal segment fashion. It was  dilated to a nominal pressure. After this, we used a noncompliant  balloon to postdilate it at high pressures to ensure good vessel wall  apposition. Repeat angiogram showed good CRISTOBAL-3 flow throughout the  entire main vessel as well as the side branches.   There were no dissections, perforations, distal embolizations, or  side branch  closures that were noted. The patient tolerated the procedure well. There were no complications. All guide catheters and wires were removed from the patient's body. Hemostasis of the right radial artery was achieved with a Vasc Band  device. The patient was transferred to his room in stable condition. SUMMARY:  Successful PCI of proximal to mid portion of the LAD with  Xience Asmita drug-eluting stent. RECOMMENDATIONS:  1. DAPT for at least one year. 2.  Lipid-lowering therapy. 3.  Aggressive risk factor modification. 4.  Consider cardiac rehab.  5.  Routine post-PCI care. All procedure details and management plans were discussed in detail with  the  patient and family members, and they were in agreement with the  plan.         Jeramie Porter MD    D: 02/12/2021 6:11:00       T: 02/12/2021 7:18:21     [email or web address]  Job#: [id number]     Doc#: [id number]    CC:

## 2021-02-12 NOTE — PROGRESS NOTES
[address] [place] [address],[place] [address] [address] [place] [address]  Dept: [phone]  Dept Fax: [phone]  Loc: [phone]    Visit Date: 2/12/2021    Mr. Katiuska Feliz is a 63 y.o. male  who presented for:  Chief Complaint   Patient presents with    Check-Up    Hyperlipidemia       HPI:   62 yo M c hx HLD with recent STEMI 10/22/20 LCX and LAD, EF 55-60%, smoker is here for a follow up. States he is feeling well, but still having some chest 'twinges', thinks its just due to his chest muscles. Still smoking, 4-5 cigarettes per day. Denies any chest pain, sob, palpitations, lightheadedness, dizziness, orthopnea, PND or pedal edema. Current Outpatient Medications:     ezetimibe (ZETIA) 10 MG tablet, Take 10 mg by mouth daily , Disp: , Rfl:     aspirin 81 MG EC tablet, Take 1 tablet by mouth daily, Disp: 30 tablet, Rfl: 3    atorvastatin (LIPITOR) 80 MG tablet, Take 1 tablet by mouth nightly, Disp: 30 tablet, Rfl: 3    lisinopril (PRINIVIL;ZESTRIL) 5 MG tablet, Take 1 tablet by mouth daily, Disp: 30 tablet, Rfl: 3    carvedilol (COREG) 3.125 MG tablet, Take 1 tablet by mouth 2 times daily (with meals), Disp: 60 tablet, Rfl: 3    ticagrelor (BRILINTA) 90 MG TABS tablet, Take 1 tablet by mouth 2 times daily, Disp: 60 tablet, Rfl: 3    nitroGLYCERIN (NITROSTAT) 0.4 MG SL tablet, up to max of 3 total doses.  If no relief after 1 dose, call 911., Disp: 25 tablet, Rfl: 1    albuterol (PROVENTIL) (2.5 MG/3ML) 0.083% nebulizer solution, Take 2.5 mg by nebulization every 6 hours as needed for Wheezing, Disp: , Rfl:     doxepin (SINEQUAN) 50 MG capsule, Take 50 mg by mouth nightly, Disp: , Rfl:     fluticasone (FLONASE) 50 MCG/ACT nasal spray, , Disp: , Rfl:     PROAIR  (90 Base) MCG/ACT inhaler, , Disp: , Rfl:     ADVAIR DISKUS 250-50 MCG/DOSE AEPB, , Disp: , Rfl:     Past Medical History  Brooklyn Cespedes  has a past medical history of Hyperlipidemia. Social History  Ze Burnett  reports that he is a non-smoker but has been exposed to tobacco smoke. He has never used smokeless tobacco. He reports current drug use. Drug: Marijuana. He reports that he does not drink alcohol. Family History  Ze Burnett family history includes Cancer in his maternal grandfather; Heart Disease in his maternal grandfather, maternal grandmother, and paternal grandmother; Substance Abuse in his maternal uncle. Past Surgical History   Past Surgical History:   Procedure Laterality Date    BACK SURGERY      DIAGNOSTIC CARDIAC CATH LAB PROCEDURE  10/23/2020    2 stents        Subjective:     REVIEW OF SYSTEMS  Constitutional: denies sweats, chills and fever  HENT: denies  congestion, sinus pressure, sneezing and sore throat. Eyes: denies  pain, discharge, redness and itching. Respiratory: denies apnea, cough  Gastrointestinal: denies blood in stool, constipation, diarrhea   Endocrine: denies cold intolerance, heat intolerance, polydipsia. Genitourinary: denies dysuria, enuresis, flank pain and hematuria. Musculoskeletal: denies arthralgias, joint swelling and neck pain. Neurological: denies numbness and headaches. Psychiatric/Behavioral: denies agitation, confusion, decreased concentration and dysphoric mood    All others reviewed and are negative. Objective:     /62   Pulse 98   Ht 5' 9\" (1.753 m)   Wt 205 lb (93 kg)   BMI 30.27 kg/m²     Wt Readings from Last 3 Encounters:   02/12/21 205 lb (93 kg)   11/12/20 205 lb 12.8 oz (93.4 kg)   10/30/20 206 lb (93.4 kg)     BP Readings from Last 3 Encounters:   02/12/21 126/62   10/30/20 118/60   10/23/20 126/79       PHYSICAL EXAM  Constitutional: Oriented to person, place, and time. Appears well-developed and well-nourished. HENT:   Head: Normocephalic and atraumatic. Eyes: EOM are normal. Pupils are equal, round, and reactive to light. Neck: Normal range of motion. Neck supple. No JVD present. Cardiovascular: Normal rate , normal heart sounds and intact distal pulses. Pulmonary/Chest: Effort normal and breath sounds normal. No respiratory distress. No wheezes. No rales. Abdominal: Soft. Bowel sounds are normal. No distension. There is no tenderness. Musculoskeletal: Normal range of motion. No edema. Neurological: Alert and oriented to person, place, and time. No cranial nerve deficit. Coordination normal.   Skin: Skin is warm and dry. Psychiatric: Normal mood and affect.        No results found for: CKTOTAL, CKMB, CKMBINDEX    Lab Results   Component Value Date    WBC 10.3 10/22/2020    RBC 4.71 10/22/2020    HGB 15.7 10/22/2020    HCT 45.7 10/22/2020    MCV 97.0 10/22/2020    MCH 33.3 10/22/2020    MCHC 34.4 10/22/2020     10/22/2020    MPV 10.1 10/22/2020       Lab Results   Component Value Date     10/22/2020    K 3.9 10/22/2020     10/22/2020    CO2 24 10/22/2020    BUN 21 10/22/2020    CREATININE 1.0 10/22/2020    CALCIUM 10.6 10/22/2020    LABGLOM 75 10/22/2020    GLUCOSE 119 10/22/2020       No results found for: ALKPHOS, ALT, AST, PROT, BILITOT, BILIDIR, IBILI, LABALBU    No results found for: MG    No results found for: INR, PROTIME      No results found for: LABA1C    Lab Results   Component Value Date    TRIG 184 10/23/2020    HDL 38 10/23/2020    LDLCALC 125 10/23/2020       No results found for: TSH      Testing Reviewed:      I haveindividually reviewed the below cardiac tests    EKG:    ECHO:   Results for orders placed during the hospital encounter of 10/22/20   ECHO Complete 2D W Doppler W Color    Narrative Transthoracic Echocardiography Report (TTE)     Demographics      Patient Name   [facility]        Gender              Male                  Misael Rebolledo      MR #           [id number]         Race                                                       Ethnicity      Account #      [de-identified]         Room Number         5975      Accession [id number]        Date of Study       10/23/2020   Number      Date of Birth  1955        Referring Physician Gilford Sera MD Isidore Roa MD      Age            63 year(s)        Sonographer         [facility],                                                        T                                       Interpreting        Jamie Espinal MD                                    Physician     Procedure    Type of Study      TTE procedure:ECHOCARDIOGRAM COMPLETE 2D W DOPPLER W COLOR. Procedure Date  Date: 10/23/2020 Start: 06:28 AM    Study Location: Bedside  Technical Quality: Limited visualization due to poor acoustical window. Indications:STEMI. Additional Medical History:Chest Pain, Marijuana Dependence. Patient Status: Routine    Height: 70 inches Weight: 200 pounds BSA: 2.09 m^2 BMI: 28.7 kg/m^2    BP: 112/76 mmHg    Allergies    - No Known Allergies.      - No Known Allergies. Conclusions      Summary   Technically difficult study due to poor acoustic windows. Left ventricular size is normal and systolic function is moderately   reduced. Ejection fraction was estimated at 40-45%. LV wall thickness is   within normal limits. There is mild inferolateral wall hypokinesis. The right ventricular size appears normal with normal systolic function   and wall thickness. Mild mitral regurgitation is present. Signature      ----------------------------------------------------------------   Electronically signed by Jamie Espinal MD (Interpreting   physician) on 10/25/2020 at 12:48 PM   ----------------------------------------------------------------      Findings      Mitral Valve   The mitral valve structure is normal with normal leaflet separation. DOPPLER: The transmitral velocity was within the normal range with no   evidence for mitral stenosis. Mild mitral regurgitation is present.       Aortic Valve   The aortic valve leaflets were not well visualized. DOPPLER: Transaortic velocity was within the normal range with no evidence   of aortic stenosis. There was no evidence of aortic regurgitation. Tricuspid Valve   The tricuspid valve structure is normal with normal leaflet separation. DOPPLER: There is no evidence of tricuspid stenosis. There was no evidence   of tricuspid regurgitation. Pulmonic Valve   The pulmonic valve was not well visualized. Left Atrium   Left atrial size is normal.      Left Ventricle   Left ventricular size is normal and systolic function is moderately   reduced. Ejection fraction was estimated at 40-45%. LV wall thickness is   within normal limits. There is mild inferolateral wall hypokinesis. Right Atrium   Right atrial size was normal.      Right Ventricle   The right ventricular size appears normal with normal systolic function   and wall thickness. Pericardial Effusion   The pericardium appears normal with no evidence of a pericardial effusion. Pleural Effusion   No evidence of pleural effusion. Aorta / Great Vessels   -Aortic root dimension within normal limits. -IVC size is within normal limits with normal respiratory phasic changes.      M-Mode/2D Measurements & Calculations      LV Diastolic   LV Systolic Dimension:    AV Cusp Separation: 2.9 cmLA   Dimension: 5.1 3.8 cm                    Dimension: 3.6 cmAO Root   cm             LV Volume Diastolic: 01.4 Dimension: 3.6 cmLA Area: 15.6   LV FS:25.5 %   ml                        cm^2   LV PW          LV Volume Systolic: 07.1   Diastolic: 1.4 ml   cm             LV EDV/LV EDV Index: 73.3   Septum         ml/35 m^2LV ESV/LV ESV    RV Diastolic Dimension: 2.6 cm   Diastolic: 1   Index: 50.9 ml/18 m^2   cm             EF Calculated: 47.8 %     LA/Aorta: 1                     LV Length: 8 cm           LA volume/Index: 36.3 ml /17m^2      LV Area   Diastolic:   77.5 cm^2   LV Area   Systolic: 08.7   cm^2 Doppler Measurements & Calculations      MV Peak E-Wave: 62.6  AV Peak Velocity: 140  LVOT Peak Velocity: 91.8 cm/s   cm/s                  cm/s                   LVOT Mean Velocity: 64 cm/s   MV Peak A-Wave: 42.8  AV Peak Gradient: 7.84 LVOT Peak Gradient: 3   cm/s                  mmHg                   mmHgLVOT Mean Gradient: 2   MV E/A Ratio: 1.46    AV Mean Velocity: 86.5 mmHg   MV Peak Gradient:     cm/s   1.57 mmHg             AV Mean Gradient: 4    TV Peak E-Wave: 54.4 cm/s                         mmHg                   TV Peak A-Wave: 38.6 cm/s   MV Deceleration Time: AV VTI: 27.9 cm   373 msec                                     TV Peak Gradient: 1.18 mmHg                            LVOT VTI: 18.6 cm      PV Peak Velocity: 55.3 cm/s   MV E' Septal          IVRT: 116 msec         PV Peak Gradient: 1.22 mmHg   Velocity: 7.7 cm/s   MV A' Septal   Velocity: 8.8 cm/s    AV DVI (VTI): 0.67AV   MV E' Lateral         DVI (Vmax):0.66   Velocity: 10.1 cm/s   MV A' Lateral   Velocity: 11.4 cm/s   E/E' septal: 8.13   E/E' lateral: 6.2   MR Velocity: 481 cm/s     [id number]       STRESS:    CATH:    Assessment/Plan       Diagnosis Orders   1. Coronary artery disease due to lipid rich plaque     2. ST elevation myocardial infarction (STEMI), unspecified artery ([address]       STEMI 10/22/20 LCX and LAD PCI  EF 40-45%--improved 55-60%  Smoker    Doing well  Continue Aspirin, Brilinta, Statin  Continue lisinopirl, Coreg  Continue Statin  Went to cardiac rehab  Still smoking 4-5 cigarettes per day  Echo 02/11/2021 LVEF 55-60%. No wall motion abnormalities. RV normal size with normal function. The patient is asked to make an attempt to improve diet and exercise patterns to aid in medical management of this problem.   Advised more plant based nutrition/meditarrean diet   Advised patient to call office or seek immediate medical attention if there is any new onset of  any chest pain, sob, palpitations, lightheadedness, dizziness, orthopnea, PND or pedal edema. All medication side effects were discussed in details. Thank youfor allowing me to participate in the care of this patient. Please do not hesitate to contact me for any further questions. Return in about 6 months (around 8/12/2021).        Electronically signed by Gee Pineda DO   2/12/2021 at 1:18 PM EDT

## 2021-02-15 ENCOUNTER — HOSPITAL ENCOUNTER (OUTPATIENT)
Dept: CARDIAC REHAB | Age: 66
Setting detail: THERAPIES SERIES
Discharge: HOME | End: 2021-02-15
Payer: COMMERCIAL

## 2021-02-15 PROCEDURE — 93798 PHYS/QHP OP CAR RHAB W/ECG: CPT

## 2021-02-15 NOTE — PROGRESS NOTES
Hospital Facility-Based Program  Phase 2 Cardiac Rehab Weekly Progress Report      Patient prescribed exercise:  12:00 class. 3 times per week in rehab, 1-4 times per week at home for the amount of sessions/weeks specified by insurance. Current Levels: Treadmill: 3.5mph/5% for 20 minutes, Schwinn Airdyne: Level 2.4 for 10 minutes, UBE: Level 1.2 for 5 minutes. Progression Discussion: Maintain/Increase Aerobic exercise 35 minutes to work on endurance. Attempt to increase intensity by 5-20% for each modality this week. Try to increase intensities until [place] rates the exercises a 13-17 on Deisy RPE.

## 2021-02-17 ENCOUNTER — HOSPITAL ENCOUNTER (OUTPATIENT)
Dept: CARDIAC REHAB | Age: 66
Setting detail: THERAPIES SERIES
Discharge: HOME | End: 2021-02-17
Payer: COMMERCIAL

## 2021-02-17 PROCEDURE — 93798 PHYS/QHP OP CAR RHAB W/ECG: CPT

## 2021-02-17 PROCEDURE — G0423 INTENS CARDIAC REHAB NO EXER: HCPCS

## 2021-02-17 PROCEDURE — 93797 PHYS/QHP OP CAR RHAB WO ECG: CPT

## 2021-02-17 PROCEDURE — G0422 INTENS CARDIAC REHAB W/EXERC: HCPCS

## 2021-02-17 NOTE — PROGRESS NOTES
[facility] Facility-Based Program  Individualized Cardiac Treatment Plan    Patient Name:  Dada Pina  :  1955  Age:  63 y.o. MRN: [id number]  Diagnosis: STEMI/PCI to LCX  Date of Event: 10/22/2020   Physician:  Dr. Norma Mclaughlin  Next Office Visit:  2021  Date Entered Program: 20  Risk Stratifications: [] Low [] Intermediate [x] High  Allergies: No Known Allergies    COVID -19 Screen  Do you have any of the following symptoms:  [] Fever [] Cough [] SOB [] Muscle/Body Ache [] Loss of taste/smell [x] None    Have you traveled outside of the [place]? [] Yes      [x] No    Have you been around anyone who has tested Positive for COVID 19?  [] Yes      [x] No    The following Education has been completed with patient. [x] Wait in the lobby until we call you back to rehab. [x] You must wear a mask while in the medical center, and in cardiac rehab. Please bring your own. [x] Bring your own bottle of water with you. [x] You can bring a visitor with you, however, they will need to sit in the waiting room while you are in cardiac rehab.     Individual Cardiac Treatment Plan -EXERCISE  INITIAL 30 DAY 60 DAY 90 DAY FINAL DAY   EXERCISE  ASSESSMENT/PLAN EXERCISE  REASSESSMENT EXERCISE   REASSESSMENT EXERCISE   REASSESSMENT EXERCISE  DISCHARGE/FOLLOW-UP   Date: 2020 Date: 20 Date: 21    Pt is currently on hold due to testing positive for Covid 19   Date: 2/3/2021   Date: 2021   Session #1 Session #11 Session # Session #:  34 Session # 36   Stages of Change Stages of Change Stages of Change Stages of Change Stages of Change   [] Pre Contemplation  [] Contemplation  [x] Preparation  [] Action  [] Maintenance  [] Relapse [] Pre Contemplation  [] Contemplation  [x] Preparation  [] Action  [] Maintenance  [] Relapse [] Pre Contemplation  [] Contemplation  [] Preparation  [x] Action  [] Maintenance  [] Relapse [] Pre Contemplation  [] Contemplation  [] Preparation  [] Action  [] Maintenance  [] Relapse [] Pre Contemplation  [] Contemplation  [] Preparation  [x] Action  [] Maintenance  [] Relapse   EXERCISE ASSESSMENT EXERCISE ASSESSMENT EXERCISE ASSESSMENT EXERCISE ASSESSMENT EXERCISE ASSESSMENT   6 Min Walk Test  Distance walked:   0.33 miles  1742 ft  3.53 METs  Max HR:88 BPM     RPE:  13    THR:  103-121  Rhythm:  NSR    6 Min Walk Test  Distance walked:   0.41 miles  2164 ft  4.1 METs  Max HR:117 BPM      RPE:  12   %Changeft = 24%  Rhythm:  NSR   DASI: 6.70 METs DASI: 8.97 METs DASI:  METs DASI: 9.89 METs DASI: 9.89 METs   Return to Work  [place] on returning to work? [x] Yes              [] No   [] Disabled     [] Retired    If yes:  *Job description: [employer]   Return to work: Has Izola Opal returned to work? [x] Yes    [] No    .     Return to work: Has Izola Opal returned to work? [x] Yes    [] No      Return to work: Has Izola Opal returned to work? [x] Yes    [] No     Return to work: Has Izola Opal returned to work? [x] Yes    [] No    *Required MET Level achieved for job duties? [x] Yes    [] No   Orthopedic Limitations/  [] Yes    [x] No       Orthopedic Limitations  *If patient has orthopedic issue:   Actions/  accomodations needed to make Izola [place] successful : na Orthopedic Limitations  na Orthopedic Limitations  na   Orthopedic Limitations     Fall Risk  Fall risk assessed? [x] Yes      [] No    Balance Issues? [] Yes      [x] No     [] Walker [] Cane    [x] Safety issues reviewed      Fall Risk  *If patient is a fall risk, action needed to accommodate: na Fall Risk  na Fall Risk  na Fall Risk   Home Exercise  [] Yes    [x] No   Home Exercise  [] Yes    [x] No  Type: **  Frequency:**  Duration: ** Home Exercise  [] Yes    [] No   Home Exercise  [] Yes    [x] No  Does play disc golf when weather is appropriate Home Exercise  [] Yes    [x] No  Plays disc golf   Angina with Activity?   [] Yes    [x] No   Angina with Activity? [] Yes    [x] No  Angina Management: na Angina with Activity? [] Yes    [] No   Angina with Activity? [] Yes    [x] No  Angina Management: ** Angina with Activity?   [] Yes    [x] No     EXERCISE PLAN EXERCISE PLAN EXERCISE PLAN EXERCISE PLAN EXERCISE PLAN   *Interventions* *Interventions* *Interventions* *Interventions* *Interventions*   Exercise Prescription  (per physician & CR staff) Exercise Prescription  (per physician & CR staff) Exercise Prescription  (per physician & CR staff) Exercise Prescription  (per physician & CR staff) Exercise Prescription  (per physician & CR staff)   Cardiovascular Cardiovascular Cardiovascular Cardiovascular Cardiovascular   Mode:    [x] Treadmill (TM)  [x] Schwinn Airdyne (AD)  [x] Arms Ergometer (AE)  [] NuStep  [] Elliptical (E) MODE:    [x] Treadmill (TM)  [x] Schwinn Airdyne (AD)  [x] Arms Ergometer (AE)  [] NuStep  [] Elliptical (E) MODE:    [x] Treadmill (TM)  [x] Schwinn Airdyne (AD)  [x] Arms Ergometer (AE)  [] NuStep  [] Elliptical (E) MODE:    [x] Treadmill (TM)  [x] Schwinn Airdyne (AD)  [x] Arms Ergometer (AE)  [] NuStep  [] Elliptical (E) MODE:    [x] Treadmill (TM)  [x] Schwinn Airdyne (AD)  [x] Arms Ergometer (AE)     Initial Workloads  TM: [email or web address] 3.6 METs  AD: 1.6 level = 3.5 METs  NS: 104  [place]= 3.5 METs  AE: 0.8 level = 2.2 METs Current Workloads  TM: 3.2@ 1%= 3.9 METs  AD: 70Watts  = 4.3 METs    AE: 58 mcclure = 2.2 METs Current Workloads  TM: [email or web address] %=4.5  METs  AD: 76Watts = 4.5 METs    AE: 60 [place] = 2.2 METs Current Workloads  TM:3.5 @ 3%= 5.2 METs  AD: 1.6 level = 3.4 METs  AE: 0.8 level = 2.2 METs Current Workloads  TM:3.5 @ 5%= 6.1 METs  AD: 2.4 level = 4.7 METs  AE: 1.8 level = 3.7 METs   Frequency:    ICR: 3x/week  Home: 2-3x/wk Frequency:   ICR: 3x/week  Home: 3x/wk Frequency:  ICR: 3x/week  Home: 3-4x/wk Frequency:  ICR: 3x/week  Home: 3-4x/wk Frequency:  [address] [place] [address] will continue exercise at  5-7 days/week   Duration:   Total aerobic exercise = 30-45 min    8 min/mode Duration:  Total aerobic exercises = 35 min     15 min/mode Duration:  Total aerobic exercises = 35 min     10-20min/mode Duration:  Total aerobic exercises = 30-45 min     15min/mode Duration:  Total erobic exercise =  60-90 min   Intensity:   MET Level = 3.6  RPE = 12-15 Intensity:  Max MET Level = 4.3  RPE = 12-15 Intensity:  Max MET Level = 4.5  RPE = 12-15 Intensity:  Max MET Level = 5.2  RPE = 12-15 Intensity:  Max MET Level = 6.1 RPE = 12-15   Progression: increase aerobic activity up to 15 min over next 4 weeks by increasing time 2-3 min/week. Progression: Increase intensity 5-10% over the next 4 weeks as tolerated   Progression:  Increase intensity 5-10% over the next 4 weeks as tolerated Progression:  Progressing well. Increase workloads weekly as tolerated. Progression:  Increase time/intensity when RPE <13, and HR is in [facility]   [x] Yes      [] No  Upper and Lower body strength training 2x/wk    Wt: 2#       Reps:  8-15    *Increase wt. after completing 15 reps with an RPE of <12/13. [x] Yes      [] No  Upper and Lower body strength training 2x/wk    Wt: 5#       Reps:  8-15    *Increase wt. after completing 15 reps with an RPE of <12/13. [x] Yes      [] No  Upper and Lower body strength training 2x/wk    Wt: 7#       Reps:  8-15    *Increase wt. after completing 15 reps with an RPE of <12/13. [x] Yes      [] No  Upper and Lower body strength training 2x/wk    Wt: 8#       Reps:  8-15    *Increase wt. after completing 15 reps with an RPE of <12/13. Continue Strength Training at home   [x] Exercise Log & Strength training handout given     Wt: 8#       Reps:  8-15    *Increase wt. after completing 15 reps with an RPE of <12/13.    Flexibility Flexibility Flexibility Flexibility Flexibility   [x] Yes      [] No  25 min session of Core Strength & Flexibility 1x/per week Attends Core Strength & Flexibility   [x] Yes      [] No Attends Core Strength & Flexibility   [x] Yes      [] No Attends Core Strength & Flexibility   [x] Yes      [] No Continue Core Strength & Flexibility at home   Home Exercise  *Milton verbalizes planning to initiate home walking 3-4 days/week for 20-30 min on days not in rehab. Home Exercise  *Milton verbalizes planning to walk 3-4 days/week for 30- min on days not in rehab. Home Exercise  *Milton verbalizes planning to walk 3-4days/week for 30-40 min on days not in rehab. Home Exercise  *Milton verbalizes planning to get out and exercise more when weather is better. Encouraged to walk indoors somewhere. Home Exercise  *Milton verbalizes planning to get out and exercise more when weather is better. Encouraged to walk indoors somewhere. *Education* *Education* *Education* *Education* *Education*   RPE Scale  [x] Yes      [] No  Exercise Safety  [x] Yes      [] No  Equipment Orientation  [x] Yes      [] No  S/S to Report  [x] Yes      [] No  Warm Up/Cool Down  [x] Yes      [] No  Home Exercise  [x] Yes      [] No    All Exercise Education Completed  [x] Yes      [] No   *Goals* *Goals* *Goals* *Goals* *Goals*   Milton plans to:  [x] Attend exercise sessions 3x/wk  [x] initiate home exercise 2-3x/wk for 10-20 min  [x] Increase 6 min walk distance by 10%  [] ** Milton plans to:  [x] Attend exercise sessions 3x/wk  [x] continue home exercise 2-3x/wk for 20-30 min  [] ** Milton's plans to:  [x] Attend exercise sessions 3x/wk  [x] continue home exercise 2-3x/wk for 30-45 min  [x] Determine plan of exercise following rehab  [] ** Milton plans to:  [x] Attend exercise sessions 3x/wk  [x] continue home exercise 2-3x/wk for 20-30 min  [] ** Milton achieved exercise goals?    Yes      [x] Increased 6 min walk distance by 10%  [] Currently exercising 30-60 min/day, 5-7days/wk   [x] Plans to continue exercise on own  [] Plans to join a local fitness center to continue exercise  [] Does not plan to continue to exercise after rehab   Return to ADL or Hobbies:  Jada Escoto would like to improve strength and endurance so he is able to return to hunt and play disc golf Return to ADL or Hobbies:  Jada Escoto would like to improve strength and endurance so he is able to return to hunt and play disc golf Return to ADL or Hobbies:  Jada Escoto would like to improve strength and endurance so he is able to return to all previous activites Return to ADL or Hobbies:  Jada Escoto has resumed all previous activities Return to ADL or Hobbies:  Jada Escoto has resumed all previous activities    *MET level required for above goal:  3.0-5.0 METs MET level Achieved:  4. 3METs MET level Achieved:  4.5METs MET level Achieved:  5. 2METs MET level Achieved:  6. 1METs     Individual Cardiac Treatment Plan - Nutrition  NUTRITION  ASSESSMENT/PLAN NUTRITION  REASSESSMENT NUTRITION   REASSESSMENT NUTRITION   REASSESSMENT NUTRITION  DISCHARGE/FOLLOW-UP   Stages of Change Stages of Change Stages of Change Stages of Change Stages of Change   [] Pre Contemplation  [] Contemplation  [x] Preparation  [] Action  [] Maintenance  [] Relapse [x] Pre Contemplation  [] Contemplation  [] Preparation  [] Action  [] Maintenance  [] Relapse [] Pre Contemplation  [] Contemplation  [] Preparation  [] Action  [] Maintenance  [] Relapse [] Pre Contemplation  [] Contemplation  [x] Preparation  [] Action  [] Maintenance  [] Relapse [] Pre Contemplation  [] Contemplation  [] Preparation  [x] Action  [] Maintenance  [] Relapse   NUTRITION ASSESSMENT NUTRITION ASSESSMENT NUTRITION ASSESSMENT NUTRITION ASSESSMENT NUTRITION ASSESSMENT   Weight Management  Weight: 205.8        Height: 5'9   BMI: 30.5  Weight Management  Weight: 206.6                  Weight Management  Weight: 206.4                 Weight Management  Weight: 204 Weight Management  Weight: 206.6                  BMI: 30.3   Eating Plan  Current eating habits: more fruits and veggies Eating Plan  Changes: none reported Eating Plan  Changes: Eating Plan  Changes: none Eating Plan Improvements: none   Alcohol Use  [] none          [] daily  [] weekly      [x] special          Diet Assessment Tool:  RATE YOUR PLATE  *Given to patient to complete and return. Diet Assessment Tool:    Score: 43/69      Diet Assessment Tool: RATE YOUR PLATE  Score: 70/33   NUTRITION PLAN NUTRITION PLAN NUTRITION PLAN NUTRITION PLAN NUTRITION PLAN   *Interventions* *Interventions* *Interventions* *Interventions* *Interventions*   Initial Survey given Goal Setting Discussion:   [x] Yes      [] No       Follow Up Survey Reviewed & Goals Updated:     Professional Referral  Please check if needed. []Dietician Consult   []Wt. Management Referral  []Other:  Professional Referral  Please check if needed. []Dietician Consult   []Wt. Management Referral  []Other:  Professional Referral  Please check if needed. []Dietician Consult   []Wt. Management Referral  []Other:  Professional Referral  Please check if needed. []Dietician Consult   []Wt. Management Referral  []Other: Professional Referral  Please check if needed. []Dietician Consult   []Wt. Management Referral  []Other:    *Education* *Education* *Education* *Education* *Education*   Nutritional Education Recommended    [x] Overview of The Pritikin Eating Plan video Nutritional Education Attended/Date    See Education Videos under Risk Factors Nutritional Education Attended/Date    See Education Videos under Risk Factors Nutritional Education Attended/Date    See Education Videos under Risk Factors Video Completed?     [x] Yes  [] No   *Goals* *Goals* *Goals* *Goals* *Goals*   Milton's nutritional goals are as follows:  Complete and return diet survey [x] Completed Video  [x] Complete and return diet survey [x] Complete Video  [x] Complete and return diet survey  Milton achieved nutritional goals   [x] Yes    [] No       Individual Cardiac Treatment Plan - Psychosocial  PSYCHOSOCIAL  ASSESSMENT/PLAN PSYCHOSOCIAL  REASSESSMENT PSYCHOSOCIAL   REASSESSMENT PSYCHOSOCIAL   REASSESSMENT PSYCHOSOCIAL  DISCHARGE/FOLLOW-UP   Stages of Change Stages of Change Stages of Change Stages of Change Stages of Change   [] Pre Contemplation  [] Contemplation  [] Preparation  [] Action  [x] Maintenance  [] Relapse [] Pre Contemplation  [] Contemplation  [] Preparation  [] Action  [x] Maintenance  [] Relapse [] Pre Contemplation  [] Contemplation  [] Preparation  [] Action  [x] Maintenance  [] Relapse [] Pre Contemplation  [] Contemplation  [] Preparation  [] Action  [] Maintenance  [] Relapse [] Pre Contemplation  [] Contemplation  [] Preparation  [x] Action  [] Maintenance  [] Relapse   PSYCHOSOCIAL ASSESSMENT PSYCHOSOCIAL ASSESSMENT PSYCHOSOCIAL ASSESSMENT PSYCHOSOCIAL ASSESSMENT PSYCHOSOCIAL ASSESSMENT   Behavioral Outcomes Behavioral Outcomes Behavioral Outcomes Behavioral Outcomes Behavioral Outcomes   Tool Used:  Sharad & Alexander, Quality of Life Index, Cardiac Version IV  *Given to patient to complete. Tool Used:    Sharad & Alexander, Quality of Life Index, Cardiac Version IV   QOL Index Score: 27.88    HF:26.5  S&E:29.06  P&S: 28.29  Family: 30   Tool Used:     Sharad & Alexander, Quality of Life Index, Cardiac Version IV  QOL Index Score: 27.34    HF:26.63  S&E:27.5  P&S: 28.3  Family: 28.3   PHQ-9 score 0  Depression Severity  [x]Minimal  []Mild   []Moderate  []Moderately Severe  []Severe    PHQ-9 score 0  Depression Severity  []Minimal  []Mild   []Moderate  []Moderately Severe   []Severe   Does patient have Family Support? [x] Yes      [] No  No signs of marital/family distress       Within the Past Month:  *Have you wished you were dead or wished you could go to sleep and not wake up? [] Yes      [x] No  *Have you had any thoughts of killing yourself?   [] Yes      [x] No         Using a scale of 0-10, 0=none, 10=very:   Rate your depression: 0  Rate your anxiety:  0  Using a scale of 0-10, 0=none, 10=very:   Rate your depression: 0  Rate your anxiety:  4 Using a scale of 0-10, 0=none, 10=very:   Rate your depression: 0  Rate your anxiety:  2 Using a scale of 0-10, 0=none, 10=very:   Rate your depression: **  Rate your anxiety:  ** Using a scale of 0-10, 0=none, 10=very:   Rate your depression: 0  Rate your anxiety:  0   Signs and Symptoms of Depression Present? [] Yes      [x] No   Signs and Symptoms of Depression Present? [] Yes      [x] No   Signs and Symptoms of Depression Present? [] Yes      [x] No   Signs and Symptoms of Depression Present? [] Yes      [] No  If yes, please explain:  ** Signs and Symptoms of Depression Present? [] Yes      [x] No     Signs and Symptoms of Anxiety Present? [] Yes      [x] No  If yes, please explain:  Thinks he may have had a couple anxiety attacks, but no issues on regular basis Signs and Symptoms of Anxiety Present? [] Yes      [x] No   Signs and Symptoms of Anxiety Present? [] Yes      [x] No   Signs and Symptoms of Anxiety Present? [] Yes      [] No  If yes, please explain:  ** Signs and Symptoms of Anxiety Present? [] Yes      [x] No     [] Patient has poor eye contact   [] Flat affect present. [] Signs of anxiety, anger or hostility    [] Signs social isolation present.    []  Signs of alcohol or substance abuse       PSYCHOSOCIAL PLAN PSYCHOSOCIAL PLAN PSYCHOSOCIAL PLAN PSYCHOSOCIAL PLAN PSYCHOSOCIAL PLAN   *Interventions* *Interventions* *Interventions* *Interventions* *Interventions*   *Please check if needed  [] Psych Consult  [] Physician Referral  [] Stress Management Skills *Please check if needed  [] Psych Consult  [] Physician Referral  [x] Stress Management Skills *Please check if needed  [] Psych Consult  [] Physician Referral  [x] Stress Management Skills *Please check if needed  [] Psych Consult  [] Physician Referral  [x] Stress Management Skills *Please check if needed  [] Psych Consult  [] Physician Referral  [x] Stress Management Skills   Is patient currently taking anti-depressant or anti-anxiety medications? [x] Yes      [] No  If yes, please list medications:  doxepin Change in anti-depressant or anti-anxiety medications? [] Yes      [x] No   Change in anti-depressant or anti-anxiety medications? [] Yes      [x] No   Change in anti-depressant or anti-anxiety medications? [] Yes      [x] No  If yes, please list medications:  ** Change in anti-depressant or anti-anxiety medications? [] Yes      [x] No     *Education* *Education* *Education* *Education* *Education*   Healthy Mind Education Recommended    [x] Healthy Minds, Bodies,Hearts video See Education Videos under Risk Factor See Education Videos under Risk Factor See Education Videos under Risk Factor Video Completed? [] Yes  [] No   *Goals* *Goals* *Goals* *Goals* *Goals*   Milton's psychosocial goals are as follows:  Complete HADS & Sharad & Alexander, Quality of Life Index, Cardiac Version IV [x] Reduce depression symptom severity by 1 level [x] Reduce depression symptom severity by 1 level [] Reduce depression symptom severity by 1 level Milton achieved psychosocial goals?   [x] Yes    [] No    [x] Use methods learned to continue to reduce depression symptom severity by 1 level     Individual Cardiac Treatment Plan - Other:  Risk Factor/Education  RISK FACTOR  ASSESSMENT/PLAN RISK FACTOR  REASSESSMENT  RISK FACTOR  REASSESSMENT RISK FACTOR  REASSESSMENT RISK FACTOR   DISCHARGE/FOLLOW-UP   Stages of Change Stages of Change Stages of Change Stages of Change Stages of Change   [] Pre Contemplation  [] Contemplation  [x] Preparation  [] Action  [] Maintenance  [] Relapse [] Pre Contemplation  [x] Contemplation  [] Preparation  [] Action  [] Maintenance  [] Relapse [] Pre Contemplation  [] Contemplation  [x] Preparation  [] Action  [] Maintenance  [] Relapse [] Pre Contemplation  [] Contemplation  [x] Preparation  [] Action  [] Learning Barriers Addressed:  [] Yes      [x] No Learning Barriers Addressed:  [x] Yes      [] No     RISK FACTOR/EDUCATION PLAN RISK FACTOR/EDUCATION PLAN RISK FACTOR/EDUCATION PLAN RISK FACTOR/EDUCATION PLAN RISK FACTOR/EDUCATION PLAN   *Interventions* *Interventions* *Interventions* *Interventions* *Interventions*   Recommended Educational Videos    [x] Heart Disease Risk Reduction  [x] Overview of The Pritikin Eating Plan  [x] Move It! [x] Healthy Minds, Bodies, Hearts       Completed Videos      11/16/20  Heart Disease Risk Reduction Completed Videos Completed Videos      2/1/21  Overview of The Pritikin Eating Plan Recommended Educational Videos Completed    [x] Yes      [] No    Smoking - 2/17/2021          Smoking Cessation/Relaspe Prevention Intervention needed? [] Yes      [x] No   Smoking Cessation/Relaspe Prevention Intervention needed? [x] Yes      Smoking Counseling attended  [x] Yes      [] No     Professional Referrals:  *Please check if needed  [] Diabetes Clinic  [] Lipid Clinic   [] Other:     Professional Referrals:  *Please check if needed  [] Diabetes Clinic  [] Lipid Clinic   [] Other:   Preventative Medication Preventative Medication Preventative Medication Preventative Medication Preventative Medication   Aspirin  [x] Yes    [] No  Blood Thinner: Clopidogrel/Effient/Brillinta  [x] Yes    [] No  Beta Blocker  [x] Yes    [] No  Ace Inhibitor  [x] Yes    [] No  Statin/Lipid Lowering  [x] Yes    [] No Medication Changes? [] Yes    [x] No Medication Changes? [] Yes    [x] No Medication Changes? [] Yes    [x] No Medication Changes? [] Yes    [x] No   *Education* *Education* *Education* *Education* *Education*   Does Gennett Short require any additional education? [] Yes    [x] No   Does Gennett Short require any additional education? [x] Yes    [] No Does Gennett Short require any additional education? [] Yes    [] No Does Gennett Short require any additional education?   [] Yes    [x] No Does Gennett Short require any additional education?   [] Yes    [x] No   Recommended Additional Educational Videos    Medical  [] Diseases of Our Time-Part 1  [] Diseases of Our Times-Part 2  [] Metabolic Syndrome & Belly Fat  [] Hypertension & Heart Disease  [] Decoding Lab Results  [] Aging Enhancing Your QoL  [] Sleep Disorders  Exercise  [] Biomechanical Limitations  [] Body Composition  [] Improve Performance  [] Exercise Action Plan  [] Intro to Yoga  Behavioral  [] How Our Thoughts Can Heal Our Hearts  [] Smoking Cessation  Nutrition  [] Becoming A Pritikin   [] Calorie Density  [] Label Reading-Part 1  [] Facts on Fat  [] Biology of Weight Control  [] Nurtition Action Plan  [] Planning Your Eating Strategy  [] Lable Reading- Part 2  [] Dining Out-Part 1  [] Dining Out - Part 2  [] Targeting Your Nutrition Priorities  [] Fueling a Healthy Body  [] Menu Workshop  [employer]   [] Breakfast & Snacks  [] [employer] Salads & Dressing  [] [address] [place] [address]  [] Soups & Desserts Additional Educational Videos Completed           Additional Educational Videos Completed Additional Educational Videos Completed Additional Educational Videos Completed       *Goals* *Goals* *Goals* *Goals* *Goals*   Milton's risk factor/education goals are as follows:    [x] Optimal BP <140/90  [] Blood Sugar <120  [x] Attend recommended video education sessions  [x] Takes medications as prescribed 100% of the time   [] **   Milton's risk factor/education goals are as follows:    [x] Optimal BP <140/90  [] Blood Sugar <120  [x] Attend recommended video education sessions  [x] Takes medications as prescribed 100% of the time   [] ** Milton's risk factor/education goals are as follows:    [x] Optimal BP <140/90  [] Blood Sugar <120  [x] Attend recommended video education sessions  [x] Takes medications as prescribed 100% of the time   [] ** Milton's risk factor/education goals are as follows:    [x] Optimal BP <140/90  [] Blood Sugar <120  [x] Attend recommended video education sessions  [x] Takes medications as prescribed 100% of the time   [] ** Milton achieved risk factor goals?   [x] Yes    [] No         Monitored telemetry has revealed nsr  [] documented arrhythmia at increasing workloads  [] associated symptoms  Monitored telemetry has revealed NSR[]  Monitored telemetry has revealed NSR   Monitored telemetry has revealed NSR  [] documented arrhythmia at increasing workloads  [] associated symptoms ** Monitored telemetry has revealed NSR     Physician Response    [x] Cardiac rehab is reasonably and medically necessary for continuous cardiac monitoring surveillance  of patient's cardiac activity  [x] Initiate continuous telemerty monitoring and notify me with any concerns  [] Other   Physician Response    [x] Cardiac rehab is reasonably and medically necessary for continuous cardiac monitoring surveillance  of patient's cardiac activity  [x] Continue continuous telemerty monitoring and notify me with any concerns  [] Other     Physician Response      [x] Cardiac rehab is reasonably and medically necessary for continuous cardiac monitoring surveillance  of patient's cardiac activity  [x] Continue continuous telemerty monitoring and notify me with any concerns   [] Other     Physician Response    [x] Cardiac rehab is reasonably and medically necessary for continuous cardiac monitoring surveillance  of patient's cardiac activity  [x] Continue continuous telemerty monitoring and notify me with any concerns   [] Other        Cosigned by:  Emmanuel Stovall MD at 11/13/2020 10:24 AM    Revision History     Date/Time  User  Provider Type  Action    11/13/2020 10:24 AM  Emmanuel Stovall MD  Physician  Cosign    11/12/2020  1:26 PM  Shakeel Benjamin  Exercise Physiologist  Sign      Cosigned by: Emmanuel Stovall MD at 12/10/2020  8:11 AM   Revision History  Date/Time User Provider Type Action   12/10/2020  8:11 AM Emmanuel Stovall MD Physician Cosign   12/9/2020 12:34 PM Hodan Rothman Exercise Physiologist Sign        Cosigned by: Carmen Carreno MD at 1/7/2021  8:01 AM   Revision History  Date/Time User Provider Type Action   1/7/2021  8:01 AM Carmen Carreno MD Physician Cosign   1/6/2021 10:01 AM Hodan Rothman Exercise Physiologist Sign     Cosigned by: Carmen Carreno MD at 2/6/2021  5:54 PM   Revision History  Date/Time User Provider Type Action   2/6/2021  5:54 PM Carmen Carreno MD Physician Cosign   2/3/2021 12:33 PM Crissy Carter Exercise Physiologist Sign

## 2021-02-17 NOTE — PROGRESS NOTES
Video Education Report - ICR/CR  Name:  Dagoberto Mason     Date:  2/17/2021  MRN: [id number]     Session #:  39  Session Length: 45 min    Core Videos        []Heart Disease Risk Reduction       []Overview of Pritikin Eating Plan      []Move it          []Calorie Density         []Healthy Minds, Bodies, Hearts        []Label Reading - Part 1       []Metabolic Syndrome and Belly Fat        []How Our Thoughts Can Heal Our Hearts   []Dining Out - Part 1      []Biomechanical Limitations  []Facts on Fat        []Hypertension & Heart Disease    []Diseases of Our Time - Focusing on Diabetes   []Body Composition  []Nutrition Action Plan    []Exercise Action Plan    Exercise      Healthy Mind-Set  []Improving Performance    [x]Smoking Cessation    []Introduction to [address]  []Aging-Enhancing the Quality of Your Life  []Becoming a Pritikin   []Biology of Weight Control    []Cooking Breakfasts   []Decoding Lab Results    and Snacks  []Diseases of Our Time - Overview   []Cooking Dinner and   []Sleep Disorders     Sides  []Targeting Your Nutrition Priorities   []Healthy Salads &   Dressings  Nutrition      []Cooking Soups and   []Dining Out - Part 2     Desserts  []Fueling a Healthy Body   []Menu Workshop     Overview  []Planning Your Eating Strategy   []The Pritikin Solution  []Vitamins and Minerals    Comments:  Video completed, group discussion

## 2021-02-19 ENCOUNTER — APPOINTMENT (OUTPATIENT)
Dept: CARDIAC REHAB | Age: 66
End: 2021-02-19
Payer: COMMERCIAL

## 2021-02-25 RX ORDER — TICAGRELOR 90 MG/1
TABLET ORAL
Qty: 180 TABLET | Refills: 3 | Status: SHIPPED | OUTPATIENT
Start: 2021-02-25 | End: 2022-04-04 | Stop reason: SDUPTHER

## 2021-02-25 RX ORDER — ACETAMINOPHEN/DIPHENHYDRAMINE 500MG-25MG
TABLET ORAL
Qty: 90 TABLET | Refills: 3 | Status: SHIPPED | OUTPATIENT
Start: 2021-02-25 | End: 2022-04-04 | Stop reason: SDUPTHER

## 2021-02-25 RX ORDER — LISINOPRIL 5 MG/1
TABLET ORAL
Qty: 90 TABLET | Refills: 3 | Status: SHIPPED | OUTPATIENT
Start: 2021-02-25 | End: 2022-03-18 | Stop reason: SDUPTHER

## 2021-02-25 RX ORDER — CARVEDILOL 3.12 MG/1
TABLET ORAL
Qty: 180 TABLET | Refills: 3 | Status: SHIPPED | OUTPATIENT
Start: 2021-02-25 | End: 2022-04-04 | Stop reason: SDUPTHER

## 2021-02-25 RX ORDER — ATORVASTATIN CALCIUM 80 MG/1
80 TABLET, FILM COATED ORAL NIGHTLY
Qty: 90 TABLET | Refills: 3 | Status: SHIPPED | OUTPATIENT
Start: 2021-02-25 | End: 2022-03-21 | Stop reason: SDUPTHER

## 2021-06-15 ENCOUNTER — HOSPITAL ENCOUNTER (OUTPATIENT)
Dept: CT IMAGING | Age: 66
Discharge: HOME | End: 2021-06-15
Payer: COMMERCIAL

## 2021-06-15 DIAGNOSIS — R91.8 MASS OF LUNG: ICD-10-CM

## 2021-06-15 PROCEDURE — 71250 CT THORAX DX C-: CPT

## 2022-01-10 ENCOUNTER — OFFICE VISIT (OUTPATIENT)
Dept: CARDIOLOGY CLINIC | Age: 67
End: 2022-01-10
Payer: COMMERCIAL

## 2022-01-10 ENCOUNTER — HOSPITAL ENCOUNTER (OUTPATIENT)
Dept: AUDIOLOGY | Age: 67
Discharge: HOME | End: 2022-01-10

## 2022-01-10 VITALS
HEART RATE: 63 BPM | HEIGHT: 69 IN | WEIGHT: 205 LBS | DIASTOLIC BLOOD PRESSURE: 71 MMHG | SYSTOLIC BLOOD PRESSURE: 117 MMHG | BODY MASS INDEX: 30.36 KG/M2

## 2022-01-10 DIAGNOSIS — I25.10 CORONARY ARTERY DISEASE DUE TO LIPID RICH PLAQUE: Primary | ICD-10-CM

## 2022-01-10 DIAGNOSIS — I25.83 CORONARY ARTERY DISEASE DUE TO LIPID RICH PLAQUE: Primary | ICD-10-CM

## 2022-01-10 PROCEDURE — V5267 HEARING AID SUP/ACCESS/DEV: HCPCS | Performed by: AUDIOLOGIST

## 2022-01-10 PROCEDURE — 99214 OFFICE O/P EST MOD 30 MIN: CPT | Performed by: INTERNAL MEDICINE

## 2022-01-10 PROCEDURE — 93000 ELECTROCARDIOGRAM COMPLETE: CPT | Performed by: INTERNAL MEDICINE

## 2022-01-10 RX ORDER — NICOTINE 21 MG/24HR
1 PATCH, TRANSDERMAL 24 HOURS TRANSDERMAL DAILY
Qty: 14 PATCH | Refills: 5 | Status: SHIPPED | OUTPATIENT
Start: 2022-01-10 | End: 2022-01-24

## 2022-01-10 NOTE — PROGRESS NOTES
[address] [place] [address],[place] [address] [address] [place] [address]  Dept: [phone]  Dept Fax: [phone]  Loc: [phone]    Visit Date: 1/10/2022    Mr. Dennie Makos is a 61 y.o. male  who presented for:  Chief Complaint   Patient presents with    Check-Up    Coronary Artery Disease       HPI:   60 yo M c hx HLD with STEMI of RCA, EF 40-45%, smoker is here for afollow up. States he is feeling well. Denies any chest pain, sob, palpitations, lightheadedness, dizziness, orthopnea, PND or pedal edema. He wants to try to stop smoking. Current Outpatient Medications:     nicotine (NICODERM CQ) 14 MG/24HR, Place 1 patch onto the skin daily for 14 days, Disp: 14 patch, Rfl: 5    atorvastatin (LIPITOR) 80 MG tablet, take 1 tablet by mouth nightly, Disp: 90 tablet, Rfl: 3    RA ASPIRIN EC ADULT LOW ST 81 MG EC tablet, take 1 tablet by mouth once daily, Disp: 90 tablet, Rfl: 3    lisinopril (PRINIVIL;ZESTRIL) 5 MG tablet, take 1 tablet by mouth once daily, Disp: 90 tablet, Rfl: 3    carvedilol (COREG) 3.125 MG tablet, take 1 tablet by mouth twice a day with meals, Disp: 180 tablet, Rfl: 3    BRILINTA 90 MG TABS tablet, take 1 tablet by mouth twice a day, Disp: 180 tablet, Rfl: 3    ezetimibe (ZETIA) 10 MG tablet, Take 10 mg by mouth daily , Disp: , Rfl:     nitroGLYCERIN (NITROSTAT) 0.4 MG SL tablet, up to max of 3 total doses. If no relief after 1 dose, call 911., Disp: 25 tablet, Rfl: 1    albuterol (PROVENTIL) (2.5 MG/3ML) 0.083% nebulizer solution, Take 2.5 mg by nebulization every 6 hours as needed for Wheezing, Disp: , Rfl:     fluticasone (FLONASE) 50 MCG/ACT nasal spray, , Disp: , Rfl:     PROAIR  (90 Base) MCG/ACT inhaler, , Disp: , Rfl:     ADVAIR DISKUS 250-50 MCG/DOSE AEPB, , Disp: , Rfl:     Past Medical History  Gera Rose  has a past medical history of Hyperlipidemia.     Social History  Gera Rose  reports that he is a non-smoker but has been exposed to tobacco smoke. He has never used smokeless tobacco. He reports current drug use. Drug: Marijuana Marelan Monroe). He reports that he does not drink alcohol. Family History  Maria E Holiday family history includes Cancer in his maternal grandfather; Heart Disease in his maternal grandfather, maternal grandmother, and paternal grandmother; Substance Abuse in his maternal uncle. Past Surgical History   Past Surgical History:   Procedure Laterality Date    BACK SURGERY      DIAGNOSTIC CARDIAC CATH LAB PROCEDURE  10/23/2020    2 stents        Subjective:     REVIEW OF SYSTEMS  Constitutional: denies sweats, chills and fever  HENT: denies  congestion, sinus pressure, sneezing and sore throat. Eyes: denies  pain, discharge, redness and itching. Respiratory: denies apnea, cough  Gastrointestinal: denies blood in stool, constipation, diarrhea   Endocrine: denies cold intolerance, heat intolerance, polydipsia. Genitourinary: denies dysuria, enuresis, flank pain and hematuria. Musculoskeletal: denies arthralgias, joint swelling and neck pain. Neurological: denies numbness and headaches. Psychiatric/Behavioral: denies agitation, confusion, decreased concentration and dysphoric mood    All others reviewed and are negative. Objective:     /71   Pulse 63   Ht 5' 9\" (1.753 m)   Wt 205 lb (93 kg)   BMI 30.27 kg/m²     Wt Readings from Last 3 Encounters:   01/10/22 205 lb (93 kg)   02/12/21 205 lb (93 kg)   11/12/20 205 lb 12.8 oz (93.4 kg)     BP Readings from Last 3 Encounters:   01/10/22 117/71   02/12/21 126/62   10/30/20 118/60       PHYSICAL EXAM  Constitutional: Oriented to person, place, and time. Appears well-developed and well-nourished. HENT:   Head: Normocephalic and atraumatic. Eyes: EOM are normal. Pupils are equal, round, and reactive to light. Neck: Normal range of motion. Neck supple. No JVD present.    Cardiovascular: Normal rate , normal heart sounds and intact distal pulses. Pulmonary/Chest: Effort normal and breath sounds normal. No respiratory distress. No wheezes. No rales. Abdominal: Soft. Bowel sounds are normal. No distension. There is no tenderness. Musculoskeletal: Normal range of motion. No edema. Neurological: Alert and oriented to person, place, and time. No cranial nerve deficit. Coordination normal.   Skin: Skin is warm and dry. Psychiatric: Normal mood and affect.        No results found for: CKTOTAL, CKMB, CKMBINDEX    Lab Results   Component Value Date    WBC 10.3 10/22/2020    RBC 4.71 10/22/2020    HGB 15.7 10/22/2020    HCT 45.7 10/22/2020    MCV 97.0 10/22/2020    MCH 33.3 10/22/2020    MCHC 34.4 10/22/2020     10/22/2020    MPV 10.1 10/22/2020       Lab Results   Component Value Date     10/22/2020    K 3.9 10/22/2020     10/22/2020    CO2 24 10/22/2020    BUN 21 10/22/2020    CREATININE 1.0 10/22/2020    CALCIUM 10.6 10/22/2020    LABGLOM 75 10/22/2020    GLUCOSE 119 10/22/2020       No results found for: ALKPHOS, ALT, AST, PROT, BILITOT, BILIDIR, IBILI, LABALBU    No results found for: MG    No results found for: INR, PROTIME      No results found for: LABA1C    Lab Results   Component Value Date    TRIG 184 10/23/2020    HDL 38 10/23/2020    LDLCALC 125 10/23/2020       No results found for: TSH      Testing Reviewed:      I haveindividually reviewed the below cardiac tests    EKG:    ECHO:   Results for orders placed during the hospital encounter of 10/22/20   ECHO Complete 2D W Doppler W Color    Narrative Transthoracic Echocardiography Report (TTE)     Demographics      Patient Name   [facility]        Gender              Male                  Joan Hsieh      MR #           [id number]         Race                                                       Ethnicity      Account #      [de-identified]         Room Number         6052      Accession      [id number]        Date of Study       10/23/2020   Number      Date of Birth  1955        Referring Physician Grayson Alberts MD      Age            63 year(s)        Sonographer         Kike Huff RDCS,                                                        T                                       Interpreting        Tramaine Alberts MD                                    Physician     Procedure    Type of Study      TTE procedure:ECHOCARDIOGRAM COMPLETE 2D W DOPPLER W COLOR. Procedure Date  Date: 10/23/2020 Start: 06:28 AM    Study Location: Bedside  Technical Quality: Limited visualization due to poor acoustical window. Indications:STEMI. Additional Medical History:Chest Pain, Marijuana Dependence. Patient Status: Routine    Height: 70 inches Weight: 200 pounds BSA: 2.09 m^2 BMI: 28.7 kg/m^2    BP: 112/76 mmHg    Allergies    - No Known Allergies.      - No Known Allergies. Conclusions      Summary   Technically difficult study due to poor acoustic windows. Left ventricular size is normal and systolic function is moderately   reduced. Ejection fraction was estimated at 40-45%. LV wall thickness is   within normal limits. There is mild inferolateral wall hypokinesis. The right ventricular size appears normal with normal systolic function   and wall thickness. Mild mitral regurgitation is present. Signature      ----------------------------------------------------------------   Electronically signed by Tramaine Alberts MD (Interpreting   physician) on 10/25/2020 at 12:48 PM   ----------------------------------------------------------------      Findings      Mitral Valve   The mitral valve structure is normal with normal leaflet separation. DOPPLER: The transmitral velocity was within the normal range with no   evidence for mitral stenosis. Mild mitral regurgitation is present. Aortic Valve   The aortic valve leaflets were not well visualized.    DOPPLER: Transaortic velocity was within the normal range with no evidence   of aortic stenosis. There was no evidence of aortic regurgitation. Tricuspid Valve   The tricuspid valve structure is normal with normal leaflet separation. DOPPLER: There is no evidence of tricuspid stenosis. There was no evidence   of tricuspid regurgitation. Pulmonic Valve   The pulmonic valve was not well visualized. Left Atrium   Left atrial size is normal.      Left Ventricle   Left ventricular size is normal and systolic function is moderately   reduced. Ejection fraction was estimated at 40-45%. LV wall thickness is   within normal limits. There is mild inferolateral wall hypokinesis. Right Atrium   Right atrial size was normal.      Right Ventricle   The right ventricular size appears normal with normal systolic function   and wall thickness. Pericardial Effusion   The pericardium appears normal with no evidence of a pericardial effusion. Pleural Effusion   No evidence of pleural effusion. Aorta / Great Vessels   -Aortic root dimension within normal limits. -IVC size is within normal limits with normal respiratory phasic changes.      M-Mode/2D Measurements & Calculations      LV Diastolic   LV Systolic Dimension:    AV Cusp Separation: 2.9 cmLA   Dimension: 5.1 3.8 cm                    Dimension: 3.6 cmAO Root   cm             LV Volume Diastolic: 64.2 Dimension: 3.6 cmLA Area: 15.6   LV FS:25.5 %   ml                        cm^2   LV PW          LV Volume Systolic: 35.0   Diastolic: 1.4 ml   cm             LV EDV/LV EDV Index: 73.3   Septum         ml/35 m^2LV ESV/LV ESV    RV Diastolic Dimension: 2.6 cm   Diastolic: 1   Index: 18.2 ml/18 m^2   cm             EF Calculated: 47.8 %     LA/Aorta: 1                     LV Length: 8 cm           LA volume/Index: 36.3 ml /17m^2      LV Area   Diastolic:   24.4 cm^2   LV Area   Systolic: 94.4   cm^2     Doppler Measurements & Calculations      MV Peak E-Wave: 62.6  AV participate in the care of this patient. Please do not hesitate to contact me for any further questions. Return in about 1 year (around 1/10/2023), or if symptoms worsen or fail to improve, for Review testing, Regular follow up.        Electronically signed by Jessica Aquino MD [facility]  1/10/2022 at 1:18 PM EDT

## 2022-03-13 ENCOUNTER — HOSPITAL ENCOUNTER (EMERGENCY)
Age: 67
Discharge: HOME | End: 2022-03-14
Attending: EMERGENCY MEDICINE
Payer: COMMERCIAL

## 2022-03-13 ENCOUNTER — APPOINTMENT (OUTPATIENT)
Dept: CT IMAGING | Age: 67
End: 2022-03-13
Payer: COMMERCIAL

## 2022-03-13 DIAGNOSIS — N28.89 HEMORRHAGE OF CYST OF NATIVE KIDNEY: Primary | ICD-10-CM

## 2022-03-13 DIAGNOSIS — M70.71 ILIOPSOAS BURSITIS OF RIGHT HIP: ICD-10-CM

## 2022-03-13 DIAGNOSIS — R10.31 RIGHT INGUINAL PAIN: ICD-10-CM

## 2022-03-13 DIAGNOSIS — N28.1 HEMORRHAGE OF CYST OF NATIVE KIDNEY: Primary | ICD-10-CM

## 2022-03-13 LAB
ALBUMIN SERPL-MCNC: 4.2 G/DL (ref 3.5–5.1)
ALP BLD-CCNC: 72 U/L (ref 38–126)
ALT SERPL-CCNC: 33 U/L (ref 11–66)
ANION GAP SERPL CALCULATED.3IONS-SCNC: 12 MEQ/L (ref 8–16)
AST SERPL-CCNC: 18 U/L (ref 5–40)
BACTERIA: ABNORMAL /HPF
BASOPHILS # BLD: 0.4 %
BASOPHILS ABSOLUTE: 0 THOU/MM3 (ref 0–0.1)
BILIRUB SERPL-MCNC: 0.6 MG/DL (ref 0.3–1.2)
BILIRUBIN URINE: NEGATIVE
BLOOD, URINE: ABNORMAL
BUN BLDV-MCNC: 23 MG/DL (ref 7–22)
C-REACTIVE PROTEIN: 0.69 MG/DL (ref 0–1)
CALCIUM SERPL-MCNC: 9.6 MG/DL (ref 8.5–10.5)
CASTS 2: ABNORMAL /LPF
CASTS UA: ABNORMAL /LPF
CHARACTER, URINE: CLEAR
CHLORIDE BLD-SCNC: 104 MEQ/L (ref 98–111)
CO2: 24 MEQ/L (ref 23–33)
COLOR: YELLOW
CREAT SERPL-MCNC: 1.1 MG/DL (ref 0.4–1.2)
CRYSTALS, UA: ABNORMAL
EOSINOPHIL # BLD: 2.7 %
EOSINOPHILS ABSOLUTE: 0.2 THOU/MM3 (ref 0–0.4)
EPITHELIAL CELLS, UA: ABNORMAL /HPF
ERYTHROCYTE [DISTWIDTH] IN BLOOD BY AUTOMATED COUNT: 14.7 % (ref 11.5–14.5)
ERYTHROCYTE [DISTWIDTH] IN BLOOD BY AUTOMATED COUNT: 54.5 FL (ref 35–45)
GFR SERPL CREATININE-BSD FRML MDRD: 67 ML/MIN/1.73M2
GLUCOSE BLD-MCNC: 88 MG/DL (ref 70–108)
GLUCOSE URINE: NEGATIVE MG/DL
HCT VFR BLD CALC: 41.8 % (ref 42–52)
HEMOGLOBIN: 14 GM/DL (ref 14–18)
IMMATURE GRANS (ABS): 0.04 THOU/MM3 (ref 0–0.07)
IMMATURE GRANULOCYTES: 0.6 %
KETONES, URINE: NEGATIVE
LEUKOCYTE ESTERASE, URINE: NEGATIVE
LYMPHOCYTES # BLD: 22.7 %
LYMPHOCYTES ABSOLUTE: 1.6 THOU/MM3 (ref 1–4.8)
MCH RBC QN AUTO: 33.4 PG (ref 26–33)
MCHC RBC AUTO-ENTMCNC: 33.5 GM/DL (ref 32.2–35.5)
MCV RBC AUTO: 99.8 FL (ref 80–94)
MISCELLANEOUS 2: ABNORMAL
MONOCYTES # BLD: 9.2 %
MONOCYTES ABSOLUTE: 0.6 THOU/MM3 (ref 0.4–1.3)
NITRITE, URINE: NEGATIVE
NUCLEATED RED BLOOD CELLS: 0 /100 WBC
OSMOLALITY CALCULATION: 282.5 MOSMOL/KG (ref 275–300)
PH UA: 6.5 (ref 5–9)
PLATELET # BLD: 144 THOU/MM3 (ref 130–400)
PMV BLD AUTO: 10.5 FL (ref 9.4–12.4)
POTASSIUM REFLEX MAGNESIUM: 4.2 MEQ/L (ref 3.5–5.2)
PROTEIN UA: NEGATIVE
RBC # BLD: 4.19 MILL/MM3 (ref 4.7–6.1)
RBC URINE: > 200 /HPF
RENAL EPITHELIAL, UA: ABNORMAL
SEG NEUTROPHILS: 64.4 %
SEGMENTED NEUTROPHILS ABSOLUTE COUNT: 4.5 THOU/MM3 (ref 1.8–7.7)
SODIUM BLD-SCNC: 140 MEQ/L (ref 135–145)
SPECIFIC GRAVITY, URINE: 1.02 (ref 1–1.03)
TOTAL PROTEIN: 7.1 G/DL (ref 6.1–8)
UROBILINOGEN, URINE: 0.2 EU/DL (ref 0–1)
WBC # BLD: 7 THOU/MM3 (ref 4.8–10.8)
WBC UA: ABNORMAL /HPF
YEAST: ABNORMAL

## 2022-03-13 PROCEDURE — 96374 THER/PROPH/DIAG INJ IV PUSH: CPT

## 2022-03-13 PROCEDURE — 85651 RBC SED RATE NONAUTOMATED: CPT

## 2022-03-13 PROCEDURE — 86140 C-REACTIVE PROTEIN: CPT

## 2022-03-13 PROCEDURE — 6360000002 HC RX W HCPCS: Performed by: STUDENT IN AN ORGANIZED HEALTH CARE EDUCATION/TRAINING PROGRAM

## 2022-03-13 PROCEDURE — 74177 CT ABD & PELVIS W/CONTRAST: CPT

## 2022-03-13 PROCEDURE — 87086 URINE CULTURE/COLONY COUNT: CPT

## 2022-03-13 PROCEDURE — 74176 CT ABD & PELVIS W/O CONTRAST: CPT

## 2022-03-13 PROCEDURE — 6360000002 HC RX W HCPCS: Performed by: EMERGENCY MEDICINE

## 2022-03-13 PROCEDURE — 80053 COMPREHEN METABOLIC PANEL: CPT

## 2022-03-13 PROCEDURE — 96375 TX/PRO/DX INJ NEW DRUG ADDON: CPT

## 2022-03-13 PROCEDURE — 85025 COMPLETE CBC W/AUTO DIFF WBC: CPT

## 2022-03-13 PROCEDURE — 81001 URINALYSIS AUTO W/SCOPE: CPT

## 2022-03-13 PROCEDURE — 99283 EMERGENCY DEPT VISIT LOW MDM: CPT

## 2022-03-13 PROCEDURE — 6360000004 HC RX CONTRAST MEDICATION: Performed by: EMERGENCY MEDICINE

## 2022-03-13 PROCEDURE — 51798 US URINE CAPACITY MEASURE: CPT

## 2022-03-13 RX ORDER — KETOROLAC TROMETHAMINE 30 MG/ML
15 INJECTION, SOLUTION INTRAMUSCULAR; INTRAVENOUS ONCE
Status: COMPLETED | OUTPATIENT
Start: 2022-03-13 | End: 2022-03-13

## 2022-03-13 RX ORDER — FENTANYL CITRATE 50 UG/ML
50 INJECTION, SOLUTION INTRAMUSCULAR; INTRAVENOUS ONCE
Status: COMPLETED | OUTPATIENT
Start: 2022-03-13 | End: 2022-03-13

## 2022-03-13 RX ADMIN — IOPAMIDOL 80 ML: 755 INJECTION, SOLUTION INTRAVENOUS at 23:27

## 2022-03-13 RX ADMIN — KETOROLAC TROMETHAMINE 15 MG: 30 INJECTION, SOLUTION INTRAMUSCULAR; INTRAVENOUS at 22:03

## 2022-03-13 RX ADMIN — FENTANYL CITRATE 50 MCG: 50 INJECTION INTRAMUSCULAR; INTRAVENOUS at 22:03

## 2022-03-13 ASSESSMENT — PAIN SCALES - GENERAL
PAINLEVEL_OUTOF10: 6
PAINLEVEL_OUTOF10: 5
PAINLEVEL_OUTOF10: 2

## 2022-03-13 ASSESSMENT — PAIN DESCRIPTION - FREQUENCY
FREQUENCY: CONTINUOUS
FREQUENCY: CONTINUOUS

## 2022-03-13 ASSESSMENT — PAIN - FUNCTIONAL ASSESSMENT
PAIN_FUNCTIONAL_ASSESSMENT: 0-10

## 2022-03-13 ASSESSMENT — ENCOUNTER SYMPTOMS
SORE THROAT: 0
SINUS PAIN: 0
VOMITING: 0
DIARRHEA: 0
COLOR CHANGE: 0
NAUSEA: 0
SHORTNESS OF BREATH: 0
ABDOMINAL PAIN: 1
BACK PAIN: 0
COUGH: 0
SINUS PRESSURE: 0
CONSTIPATION: 0

## 2022-03-13 ASSESSMENT — PAIN DESCRIPTION - LOCATION
LOCATION: GROIN
LOCATION: ABDOMEN;GROIN
LOCATION: ABDOMEN;GROIN

## 2022-03-13 ASSESSMENT — PAIN DESCRIPTION - PAIN TYPE
TYPE: ACUTE PAIN

## 2022-03-13 ASSESSMENT — PAIN DESCRIPTION - PROGRESSION: CLINICAL_PROGRESSION: GRADUALLY IMPROVING

## 2022-03-13 ASSESSMENT — PAIN DESCRIPTION - DESCRIPTORS: DESCRIPTORS: ACHING;CONSTANT

## 2022-03-14 VITALS
BODY MASS INDEX: 30.06 KG/M2 | SYSTOLIC BLOOD PRESSURE: 123 MMHG | WEIGHT: 210 LBS | RESPIRATION RATE: 13 BRPM | HEART RATE: 60 BPM | OXYGEN SATURATION: 95 % | HEIGHT: 70 IN | TEMPERATURE: 98.2 F | DIASTOLIC BLOOD PRESSURE: 88 MMHG

## 2022-03-14 LAB — SEDIMENTATION RATE, ERYTHROCYTE: 21 MM/HR (ref 0–10)

## 2022-03-14 RX ORDER — NALOXONE HYDROCHLORIDE 4 MG/.1ML
1 SPRAY NASAL PRN
Qty: 1 EACH | Refills: 0 | Status: SHIPPED | OUTPATIENT
Start: 2022-03-14

## 2022-03-14 RX ORDER — NALOXONE HYDROCHLORIDE 1 MG/ML
1 INJECTION INTRAMUSCULAR; INTRAVENOUS; SUBCUTANEOUS PRN
Status: DISCONTINUED | OUTPATIENT
Start: 2022-03-14 | End: 2022-03-14

## 2022-03-14 RX ORDER — OXYCODONE HYDROCHLORIDE AND ACETAMINOPHEN 5; 325 MG/1; MG/1
1 TABLET ORAL EVERY 6 HOURS PRN
Qty: 12 TABLET | Refills: 0 | Status: SHIPPED | OUTPATIENT
Start: 2022-03-14 | End: 2022-03-17

## 2022-03-14 ASSESSMENT — PAIN DESCRIPTION - LOCATION: LOCATION: ABDOMEN;GROIN

## 2022-03-14 ASSESSMENT — PAIN - FUNCTIONAL ASSESSMENT: PAIN_FUNCTIONAL_ASSESSMENT: 0-10

## 2022-03-14 ASSESSMENT — PAIN DESCRIPTION - PROGRESSION: CLINICAL_PROGRESSION: NOT CHANGED

## 2022-03-14 ASSESSMENT — PAIN SCALES - GENERAL: PAINLEVEL_OUTOF10: 2

## 2022-03-14 ASSESSMENT — PAIN DESCRIPTION - PAIN TYPE: TYPE: ACUTE PAIN

## 2022-03-14 NOTE — ED NOTES
Pt to ER with complaints of bilateral groin pain that started a few days ago. Pt states that the pain has gotten worse over the past couple days. He states he has also had frequency when urinating and today developed hematuria.  VSS     Kleber Quick RN  03/13/22 [id number]

## 2022-03-14 NOTE — ED PROVIDER NOTES
discharge and penile pain. Musculoskeletal: Negative for arthralgias, back pain, neck pain and neck stiffness. Skin: Negative for color change and rash. Neurological: Negative for dizziness, weakness, light-headedness, numbness and headaches. PAST MEDICAL AND SURGICAL HISTORY     Past Medical History:   Diagnosis Date    Hyperlipidemia      Past Surgical History:   Procedure Laterality Date    BACK SURGERY      DIAGNOSTIC CARDIAC CATH LAB PROCEDURE  10/23/2020    2 stents          MEDICATIONS   No current facility-administered medications for this encounter. Current Outpatient Medications:     oxyCODONE-acetaminophen (PERCOCET) 5-325 MG per tablet, Take 1 tablet by mouth every 6 hours as needed for Pain for up to 3 days. Intended supply: 3 days. Take lowest dose possible to manage pain, Disp: 12 tablet, Rfl: 0    naloxone 4 MG/0.1ML LIQD nasal spray, 1 spray by Nasal route as needed for Opioid Reversal, Disp: 1 each, Rfl: 0    RA ASPIRIN EC ADULT LOW ST 81 MG EC tablet, take 1 tablet by mouth once daily, Disp: 90 tablet, Rfl: 3    lisinopril (PRINIVIL;ZESTRIL) 5 MG tablet, take 1 tablet by mouth once daily, Disp: 90 tablet, Rfl: 3    carvedilol (COREG) 3.125 MG tablet, take 1 tablet by mouth twice a day with meals, Disp: 180 tablet, Rfl: 3    BRILINTA 90 MG TABS tablet, take 1 tablet by mouth twice a day, Disp: 180 tablet, Rfl: 3    ezetimibe (ZETIA) 10 MG tablet, Take 10 mg by mouth daily , Disp: , Rfl:     fluticasone (FLONASE) 50 MCG/ACT nasal spray, , Disp: , Rfl:     ADVAIR DISKUS 250-50 MCG/DOSE AEPB, , Disp: , Rfl:     nicotine (NICODERM CQ) 14 MG/24HR, Place 1 patch onto the skin daily for 14 days, Disp: 14 patch, Rfl: 5    atorvastatin (LIPITOR) 80 MG tablet, take 1 tablet by mouth nightly, Disp: 90 tablet, Rfl: 3    nitroGLYCERIN (NITROSTAT) 0.4 MG SL tablet, up to max of 3 total doses.  If no relief after 1 dose, call 911., Disp: 25 tablet, Rfl: 1    albuterol (PROVENTIL) (2.5 MG/3ML) 0.083% nebulizer solution, Take 2.5 mg by nebulization every 6 hours as needed for Wheezing, Disp: , Rfl:     PROAIR  (90 Base) MCG/ACT inhaler, , Disp: , Rfl:       SOCIAL HISTORY     Social History     Social History Narrative    Not on file     Social History     Tobacco Use    Smoking status: Former Smoker     Quit date: 2022     Years since quittin.1    Smokeless tobacco: Never Used   Vaping Use    Vaping Use: Former   Substance Use Topics    Alcohol use: No     Comment: occasional    Drug use: Yes     Types: Marijuana (Weed)     Comment: occasional         ALLERGIES   No Known Allergies      FAMILY HISTORY     Family History   Problem Relation Age of Onset    Heart Disease Maternal Grandmother     Heart Disease Maternal Grandfather     Cancer Maternal Grandfather     Heart Disease Paternal Grandmother     Substance Abuse Maternal Uncle          PREVIOUS RECORDS   Previous records reviewed: This is this patient's first visit to [address] [place] [address] ED, no previous records available on EMR. .        PHYSICAL EXAM     ED Triage Vitals [22]   BP Temp Temp Source Pulse Resp SpO2 Height Weight   124/83 98.2 °F (36.8 °C) Oral 66 16 94 % 5' 9.5\" (1.765 m) 210 lb (95.3 kg)     Initial vital signs and nursing assessment reviewed and normal. Body mass index is 30.57 kg/m². Pulsoximetry is normal per my interpretation. Additional Vital Signs:  Vitals:    22 0012   BP: 123/88   Pulse: 60   Resp: 13   Temp:    SpO2: 95%       Physical Exam  Constitutional:       General: He is not in acute distress. Appearance: Normal appearance. He is not ill-appearing or diaphoretic. HENT:      Head: Normocephalic and atraumatic. Mouth/Throat:      Mouth: Mucous membranes are moist.      Pharynx: Oropharynx is clear. No oropharyngeal exudate or posterior oropharyngeal erythema. Eyes:      Extraocular Movements: Extraocular movements intact.       Conjunctiva/sclera: Conjunctivae normal.      Pupils: Pupils are equal, round, and reactive to light. Cardiovascular:      Rate and Rhythm: Normal rate and regular rhythm. Pulses: Normal pulses. Heart sounds: Normal heart sounds. No murmur heard. No friction rub. No gallop. Pulmonary:      Effort: Pulmonary effort is normal.      Breath sounds: Normal breath sounds. No wheezing, rhonchi or rales. Abdominal:      General: Bowel sounds are normal.      Palpations: Abdomen is soft. Tenderness: There is abdominal tenderness (Suprapubic). There is no guarding or rebound. Genitourinary:     Penis: Normal.       Testes: Normal.      Comments: No tenderness to palpation of the epididymis. No masses appreciated. No rash or ulcerative lesions. Musculoskeletal:      Cervical back: Normal range of motion. No rigidity. No muscular tenderness. Right lower leg: No edema. Left lower leg: No edema. Skin:     General: Skin is warm and dry. Capillary Refill: Capillary refill takes less than 2 seconds. Findings: No bruising, erythema or lesion. Neurological:      General: No focal deficit present. Mental Status: He is alert and oriented to person, place, and time. MEDICAL DECISION MAKING   Initial Assessment:   1. Pleasant 77-year-old male resting comfortably cot no acute distress. Vital signs stable without any hypotension, fever, tachycardia or hypoxia. Coming in for gross hematuria and suprapubic pain. Nonperitoneal abdomen. No testicular rash, ecchymosis or tenderness to palpation. No penile discharge. No hernias appreciated. Differential diagnosis includes not limited to kidney stone, urethritis, cystitis, pyelonephritis, prostatitis, STI, enteritis, colitis, hernia.   Plan:    Labs   Urinalysis   Toradol, fentanyl for analgesia   CT abdomen pelvis without contrast   Expect disposition discharge home with urology follow-up        ED RESULTS   Laboratory results:  Labs Reviewed   CBC WITH AUTO DIFFERENTIAL - Abnormal; Notable for the following components:       Result Value    RBC 4.19 (*)     Hematocrit 41.8 (*)     MCV 99.8 (*)     MCH 33.4 (*)     RDW-CV 14.7 (*)     RDW-SD 54.5 (*)     All other components within normal limits   COMPREHENSIVE METABOLIC PANEL W/ REFLEX TO MG FOR LOW K - Abnormal; Notable for the following components:    BUN 23 (*)     All other components within normal limits   URINE WITH REFLEXED MICRO - Abnormal; Notable for the following components:    Blood, Urine LARGE (*)     All other components within normal limits   GLOMERULAR FILTRATION RATE, ESTIMATED - Abnormal; Notable for the following components:    Est, Glom Filt Rate 67 (*)     All other components within normal limits   SEDIMENTATION RATE - Abnormal; Notable for the following components:    Sed Rate 21 (*)     All other components within normal limits   CULTURE, URINE   ANION GAP   OSMOLALITY   C-REACTIVE PROTEIN       Radiologic studies results:  CT ABDOMEN PELVIS W IV CONTRAST Additional Contrast? None   Final Result   1. 4.1 cm hemorrhagic cyst within the left kidney. 2. Right-sided iliopsoas bursitis. This document has been electronically signed by: Marli Jarrett MD on    03/14/2022 12:12 AM      All CTs at this facility use dose modulation techniques and iterative    reconstructions, and/or weight-based dosing   when appropriate to reduce radiation to a low as reasonably achievable. CT ABDOMEN PELVIS WO CONTRAST Additional Contrast? None   Final Result   1. 4.1 cm hyperdense mass arising from the left kidney, evaluated in    limited fashion by this study. This is most likely a hemorrhagic cyst, but    cannot exclude a solid component. Recommend further evaluation with    ultrasound when clinically appropriate. 2. Right-sided iliopsoas bursitis. 3. Colonic diverticulosis without diverticulitis.       This document has been electronically signed by: Marli Jarrett MD on    03/13/2022 10:40 PM All CTs at this facility use dose modulation techniques and iterative    reconstructions, and/or weight-based dosing   when appropriate to reduce radiation to a low as reasonably achievable. ED Medications administered this visit:   Medications   fentaNYL (SUBLIMAZE) injection 50 mcg (50 mcg IntraVENous Given 3/13/22 2203)   ketorolac (TORADOL) injection 15 mg (15 mg IntraVENous Given 3/13/22 2203)   iopamidol (ISOVUE-370) 76 % injection 80 mL (80 mLs IntraVENous Given 3/13/22 2327)         ED COURSE     ED Course as of 03/14/22 0147   Mon Mar 14, 2022   0039 Touch base with orthopedics who recommend follow-up in office this week. Pain control. No need for admission or emergent surgery. Patient is agreeable and also will call to schedule appoint with urology this week. Reviewed return precautions that include fevers, inability to ambulate, or urinary retention based on the amount of blood that is in his urine. [EM]   0146 With the patient and family member bedside are in agreement to close outpatient follow-up for both identified problems. [EM]      ED Course User Index  [EM] Ivy Howard DO         Strict return precautions and follow up instructions were discussed with the patient prior to discharge, with which the patient agrees. MEDICATION CHANGES     New Prescriptions    NALOXONE 4 MG/0.1ML LIQD NASAL SPRAY    1 spray by Nasal route as needed for Opioid Reversal    OXYCODONE-ACETAMINOPHEN (PERCOCET) 5-325 MG PER TABLET    Take 1 tablet by mouth every 6 hours as needed for Pain for up to 3 days. Intended supply: 3 days. Take lowest dose possible to manage pain         FINAL DISPOSITION     Final diagnoses:   Hemorrhage of cyst of native kidney   Right inguinal pain   Iliopsoas bursitis of right hip     Condition: condition: stable  Dispo: Discharge to home      This transcription was electronically signed.  Parts of this transcriptions may have been dictated by use of voice recognition software and electronically transcribed, and parts may have been transcribed with the assistance of an ED scribe. The transcription may contain errors not detected in proofreading. Please refer to my supervising physician's documentation if my documentation differs.     Electronically Signed: Tete Yarbrough DO, 03/14/22, 1:47 AM       Tete Yarbrough DO  Resident  03/14/22 2361

## 2022-03-14 NOTE — ED NOTES
Pt resting on cot at this time. Updated on plan of care. No needs noted at this time.       Maria Alejandra Llanes RN  03/14/22 0713

## 2022-03-15 LAB
ORGANISM: ABNORMAL
URINE CULTURE, ROUTINE: ABNORMAL

## 2022-03-17 ENCOUNTER — TELEPHONE (OUTPATIENT)
Dept: UROLOGY | Age: 67
End: 2022-03-17

## 2022-03-17 ENCOUNTER — OFFICE VISIT (OUTPATIENT)
Dept: UROLOGY | Age: 67
End: 2022-03-17
Payer: COMMERCIAL

## 2022-03-17 VITALS
WEIGHT: 213 LBS | BODY MASS INDEX: 30.49 KG/M2 | HEIGHT: 70 IN | DIASTOLIC BLOOD PRESSURE: 74 MMHG | SYSTOLIC BLOOD PRESSURE: 122 MMHG

## 2022-03-17 DIAGNOSIS — R31.0 GROSS HEMATURIA: ICD-10-CM

## 2022-03-17 DIAGNOSIS — N28.1 HEMORRHAGE OF CYST OF NATIVE KIDNEY: Primary | ICD-10-CM

## 2022-03-17 DIAGNOSIS — N28.89 HEMORRHAGE OF CYST OF NATIVE KIDNEY: Primary | ICD-10-CM

## 2022-03-17 DIAGNOSIS — Z80.42 FAMILY HISTORY OF PROSTATE CANCER: ICD-10-CM

## 2022-03-17 LAB
BILIRUBIN URINE: NEGATIVE
BLOOD URINE, POC: NORMAL
CHARACTER, URINE: CLEAR
COLOR, URINE: YELLOW
GLUCOSE URINE: NEGATIVE MG/DL
KETONES, URINE: NEGATIVE
LEUKOCYTE CLUMPS, URINE: NEGATIVE
NITRITE, URINE: NEGATIVE
PH, URINE: 6.5 (ref 5–9)
PROTEIN, URINE: NEGATIVE MG/DL
SPECIFIC GRAVITY, URINE: 1.02 (ref 1–1.03)
UROBILINOGEN, URINE: 0.2 EU/DL (ref 0–1)

## 2022-03-17 PROCEDURE — 81003 URINALYSIS AUTO W/O SCOPE: CPT | Performed by: UROLOGY

## 2022-03-17 PROCEDURE — 99204 OFFICE O/P NEW MOD 45 MIN: CPT | Performed by: UROLOGY

## 2022-03-17 NOTE — TELEPHONE ENCOUNTER
Patient scheduled for MRI ABDOMEN W WO   at [facility] MR on 3/31/22 ARRIVAL OF 4 PM FOR A 430 PM SCAN TIME WITH NPO 4 HOURS PRIOR. Patient advised of instructions.   Order mailed/given to patient

## 2022-03-17 NOTE — PROGRESS NOTES
Lluvia Diane MD   Urology Clinic office Visit      Patient:  Nick Gilliam  YOB: 1955  Date: 3/17/2022    HISTORY OF PRESENT ILLNESS:   The patient is a 61 y.o. male who presents today for evaluation of the following problem(s):      1. Hemorrhage of cyst of native kidney    2. Gross hematuria    3. Family history of prostate cancer           Overall the problem(s) : are worsening. Associated Symptoms: No dysuria, gross hematuria. Pain Severity:      Summary of old records: N/A  (Patient's old records, notes and chart reviewed and summarized above.)      Gross hematuria, 4 days ago  Painless  No previous episode  No flank pains  Ct was noted to have left 4cm hyperdense mass    I independently reviewed and verified the images and reports from:    CT ABDOMEN PELVIS WO CONTRAST Additional Contrast? None    Result Date: 3/13/2022  CT abdomen and pelvis without contrast Comparison: None Findings: Calcified granuloma within the right middle lobe. 4.1 cm hyperdense mass arising from the left kidney. Multiple kidney cysts. No calculus or hydronephrosis. Calcified granulomas within the spleen. Unremarkable liver, pancreas and adrenal glands. No calcified gallstones or bile duct dilatation. No bowel obstruction, pneumoperitoneum, or pneumatosis. Pelvic contents unremarkable. Normal appendix. The bones are intact. 3.6 cm fluid collection associated with the right iliopsoas bursa. 1. 4.1 cm hyperdense mass arising from the left kidney, evaluated in limited fashion by this study. This is most likely a hemorrhagic cyst, but cannot exclude a solid component. Recommend further evaluation with ultrasound when clinically appropriate. 2. Right-sided iliopsoas bursitis. 3. Colonic diverticulosis without diverticulitis.  This document has been electronically signed by: Ellyn Rhodes MD on 03/13/2022 10:40 PM All CTs at this facility use dose modulation techniques and iterative reconstructions, and/or weight-based dosing when appropriate to reduce radiation to a low as reasonably achievable. CT ABDOMEN PELVIS W IV CONTRAST Additional Contrast? None    Result Date: 3/14/2022  CT abdomen and pelvis with contrast Comparison: CT,SR - CT ABDOMEN PELVIS WO CONTRAST - 03/13/2022 09:43 PM EDT Findings: Calcified granuloma within the right middle lobe. 4.1 cm hyperdense mass arising from the left kidney. Hounsfield units on the current study estimated at approximately 58, not significantly changed from the prior noncontrast CT. Multiple simple appearing kidney cysts. No calculus or hydronephrosis. Calcified granulomas within the spleen. Unremarkable liver, pancreas and adrenal glands. No calcified gallstones or bile duct dilatation. No bowel obstruction, pneumoperitoneum, or pneumatosis. Heterogeneous borderline size prostate. Unremarkable urinary bladder. Normal appendix. The bones are intact. There is a fluid collection associated with the right iliopsoas muscle measuring approximately 6.6 x 2.7 x 1.7 cm in size. 1. 4.1 cm hemorrhagic cyst within the left kidney. 2. Right-sided iliopsoas bursitis. This document has been electronically signed by: Yoselin Sales MD on 03/14/2022 12:12 AM All CTs at this facility use dose modulation techniques and iterative reconstructions, and/or weight-based dosing when appropriate to reduce radiation to a low as reasonably achievable.         Last several PSA's:  No results found for: PSA    Last total testosterone:  No results found for: TESTOSTERONE    Urinalysis today:  Results for POC orders placed in visit on 03/17/22   POCT Urinalysis No Micro (Auto)   Result Value Ref Range    Glucose, Ur Negative NEGATIVE mg/dl    Bilirubin Urine Negative     Ketones, Urine Negative NEGATIVE    Specific Gravity, Urine 1.020 1.002 - 1.030    Blood, UA POC Trace-intact NEGATIVE    pH, Urine 6.50 5.0 - 9.0    Protein, Urine Negative NEGATIVE mg/dl    Urobilinogen, Urine 0.20 0.0 - 1.0 eu/dl Nitrite, Urine Negative NEGATIVE    Leukocyte Clumps, Urine Negative NEGATIVE    Color, Urine Yellow YELLOW-STRAW    Character, Urine Clear CLR-SL.CLOUD         Last BUN and creatinine:  Lab Results   Component Value Date    BUN 23 (H) 2022     Lab Results   Component Value Date    CREATININE 1.1 2022       Imaging Reviewed during this Office Visit:   (results were independently reviewed by physician and radiology report verified)    PAST MEDICAL, FAMILY AND SOCIAL HISTORY:  Past Medical History:   Diagnosis Date    Hyperlipidemia      Past Surgical History:   Procedure Laterality Date    BACK SURGERY      DIAGNOSTIC CARDIAC CATH LAB PROCEDURE  10/23/2020    2 stents      Family History   Problem Relation Age of Onset    Heart Disease Maternal Grandmother     Heart Disease Maternal Grandfather     Cancer Maternal Grandfather     Heart Disease Paternal Grandmother     Substance Abuse Maternal Uncle      No outpatient medications have been marked as taking for the 3/17/22 encounter (Office Visit) with Shelbi Forde MD.       Patient has no known allergies. Social History     Tobacco Use   Smoking Status Former Smoker    Quit date: 2022    Years since quittin.2   Smokeless Tobacco Never Used       Social History     Substance and Sexual Activity   Alcohol Use No    Comment: occasional       REVIEW OF SYSTEMS:  Constitutional: negative  Eyes: negative  Respiratory: negative  Cardiovascular: negative  Gastrointestinal: negative  Musculoskeletal: negative  Genitourinary: negative except for what is in HPI  Skin: negative   Neurological: negative  Hematological/Lymphatic: negative  Psychological: negative    Physical Exam:    This a 61 y.o. male   Vitals:    22 0759   BP: 122/74     Constitutional: Patient in no acute distress; Neuro: alert and oriented to person place and time.     Psych: Mood and affect normal.  Skin: Normal  Lungs: Respiratory effort normal  Cardiovascular: Normal peripheral pulses  Abdomen: Soft, non-tender, non-distended   Bladder non-tender and not distended. Lymphatics: no palpable lymphadenopathy  Gait is within normal limits  Musculoskeletal: Normal range of motion       Assessment and Plan      1. Hemorrhage of cyst of native kidney    2. Gross hematuria    3. Family history of prostate cancer          Check MRI of hyperdense cyst  Arrange for Cystoscopy to complete hematuria work up; on brilinta  +fhx of prostate cancer, check PSA    Prescriptions Ordered:  No orders of the defined types were placed in this encounter. Orders Placed:  Orders Placed This Encounter   Procedures    MRI ABDOMEN W WO CONTRAST     Standing Status:   Future     Standing Expiration Date:   3/17/2023     Order Specific Question:   STAT Creatinine as needed:     Answer:   No     Order Specific Question:   What is the sedation requirement? Answer:   None     Order Specific Question:   Specify organ?      Answer:   Kidneys    PSA, Diagnostic     Standing Status:   Future     Standing Expiration Date:   3/17/2023    POCT Urinalysis No Micro (Auto)            MD Lluvia Talamantes M.D, MD  [facility]

## 2022-03-18 NOTE — TELEPHONE ENCOUNTER
Yen Rose called requesting a refill on the following medications:  Requested Prescriptions     Pending Prescriptions Disp Refills    lisinopril (PRINIVIL;ZESTRIL) 5 MG tablet 90 tablet 3    atorvastatin (LIPITOR) 80 MG tablet 90 tablet 3     Sig: Take 1 tablet by mouth nightly     [facility]arm[facility] verified[facility]   . pv      Date of last visit:   Date of next visit (if applicable): Visit date not found

## 2022-03-21 ENCOUNTER — HOSPITAL ENCOUNTER (OUTPATIENT)
Age: 67
Discharge: HOME | End: 2022-03-21
Payer: COMMERCIAL

## 2022-03-21 ENCOUNTER — HOSPITAL ENCOUNTER (OUTPATIENT)
Dept: MRI IMAGING | Age: 67
Discharge: HOME | End: 2022-03-21
Payer: COMMERCIAL

## 2022-03-21 DIAGNOSIS — M25.551 RIGHT HIP PAIN: ICD-10-CM

## 2022-03-21 DIAGNOSIS — Z80.42 FAMILY HISTORY OF PROSTATE CANCER: ICD-10-CM

## 2022-03-21 LAB — PROSTATE SPECIFIC ANTIGEN: 4.37 NG/ML (ref 0–1)

## 2022-03-21 PROCEDURE — 36415 COLL VENOUS BLD VENIPUNCTURE: CPT

## 2022-03-21 PROCEDURE — 73721 MRI JNT OF LWR EXTRE W/O DYE: CPT

## 2022-03-21 PROCEDURE — 84153 ASSAY OF PSA TOTAL: CPT

## 2022-03-21 RX ORDER — ATORVASTATIN CALCIUM 80 MG/1
80 TABLET, FILM COATED ORAL NIGHTLY
Qty: 90 TABLET | Refills: 3 | Status: SHIPPED | OUTPATIENT
Start: 2022-03-21

## 2022-03-21 RX ORDER — LISINOPRIL 5 MG/1
TABLET ORAL
Qty: 90 TABLET | Refills: 3 | Status: SHIPPED | OUTPATIENT
Start: 2022-03-21

## 2022-03-31 ENCOUNTER — HOSPITAL ENCOUNTER (OUTPATIENT)
Dept: MRI IMAGING | Age: 67
Discharge: HOME | End: 2022-03-31
Payer: COMMERCIAL

## 2022-03-31 DIAGNOSIS — N28.89 HEMORRHAGE OF CYST OF NATIVE KIDNEY: ICD-10-CM

## 2022-03-31 DIAGNOSIS — N28.1 HEMORRHAGE OF CYST OF NATIVE KIDNEY: ICD-10-CM

## 2022-03-31 PROCEDURE — A9579 GAD-BASE MR CONTRAST NOS,1ML: HCPCS | Performed by: UROLOGY

## 2022-03-31 PROCEDURE — 74183 MRI ABD W/O CNTR FLWD CNTR: CPT

## 2022-03-31 PROCEDURE — 6360000004 HC RX CONTRAST MEDICATION: Performed by: UROLOGY

## 2022-03-31 RX ADMIN — GADOTERIDOL 20 ML: 279.3 INJECTION, SOLUTION INTRAVENOUS at 17:48

## 2022-04-05 RX ORDER — ASPIRIN 81 MG/1
TABLET ORAL
Qty: 90 TABLET | Refills: 3 | Status: SHIPPED | OUTPATIENT
Start: 2022-04-05

## 2022-04-05 RX ORDER — CARVEDILOL 3.12 MG/1
TABLET ORAL
Qty: 180 TABLET | Refills: 3 | Status: SHIPPED | OUTPATIENT
Start: 2022-04-05

## 2022-04-07 ENCOUNTER — PROCEDURE VISIT (OUTPATIENT)
Dept: UROLOGY | Age: 67
End: 2022-04-07
Payer: COMMERCIAL

## 2022-04-07 VITALS
WEIGHT: 217.7 LBS | BODY MASS INDEX: 31.17 KG/M2 | SYSTOLIC BLOOD PRESSURE: 120 MMHG | DIASTOLIC BLOOD PRESSURE: 74 MMHG | HEIGHT: 70 IN

## 2022-04-07 DIAGNOSIS — N28.89 HEMORRHAGE OF CYST OF NATIVE KIDNEY: ICD-10-CM

## 2022-04-07 DIAGNOSIS — R31.0 GROSS HEMATURIA: Primary | ICD-10-CM

## 2022-04-07 DIAGNOSIS — Z80.42 FAMILY HISTORY OF PROSTATE CANCER: ICD-10-CM

## 2022-04-07 DIAGNOSIS — N28.1 HEMORRHAGE OF CYST OF NATIVE KIDNEY: ICD-10-CM

## 2022-04-07 DIAGNOSIS — R97.20 ELEVATED PSA: ICD-10-CM

## 2022-04-07 LAB
BILIRUBIN URINE: NEGATIVE
BLOOD URINE, POC: ABNORMAL
CHARACTER, URINE: CLEAR
COLOR, URINE: YELLOW
GLUCOSE URINE: NEGATIVE MG/DL
KETONES, URINE: NEGATIVE
LEUKOCYTE CLUMPS, URINE: ABNORMAL
NITRITE, URINE: NEGATIVE
PH, URINE: 5.5 (ref 5–9)
PROTEIN, URINE: NEGATIVE MG/DL
SPECIFIC GRAVITY, URINE: 1.02 (ref 1–1.03)
UROBILINOGEN, URINE: 0.2 EU/DL (ref 0–1)

## 2022-04-07 PROCEDURE — 99214 OFFICE O/P EST MOD 30 MIN: CPT | Performed by: UROLOGY

## 2022-04-07 PROCEDURE — 81003 URINALYSIS AUTO W/O SCOPE: CPT | Performed by: UROLOGY

## 2022-04-07 PROCEDURE — 52000 CYSTOURETHROSCOPY: CPT | Performed by: UROLOGY

## 2022-04-07 NOTE — PROGRESS NOTES
Rozina Marx MD   Urology Clinic office Visit      Patient:  April Banegas  YOB: 1955  Date: 4/7/2022    HISTORY OF PRESENT ILLNESS:   The patient is a 61 y.o. male who presents today for evaluation of the following problem(s):      1. Gross hematuria    2. Hemorrhage of cyst of native kidney    3. Family history of prostate cancer    4. Elevated PSA           Overall the problem(s) : are worsening. Associated Symptoms: No dysuria, gross hematuria. Pain Severity:      Summary of old records: N/A  (Patient's old records, notes and chart reviewed and summarized above.)      Gross hematuria, 4 days ago  Painless  No previous episode  No flank pains  Ct was noted to have left 4cm hyperdense mass      I independently reviewed and verified the images and reports from:    MRI ABDOMEN W WO CONTRAST    Result Date: 4/1/2022  PROCEDURE: MRI ABDOMEN W WO CONTRAST CLINICAL INFORMATION: Hemorrhage of cyst of native kidney. COMPARISON: CT abdomen and pelvis 3/13/2022 TECHNIQUE: Routine MRI abdomen/kidneys without and with IV contrast. CONTRAST: 20 cc ProHance contrast. FINDINGS: Lung bases: Unremarkable. Liver/gallbladder/bilary tree: No radiopaque gallstones or biliary ductal dilatation is identified. No liver lesions are observed. Pancreas: Normal. Spleen : Normal. Adrenal glands: Normal. Kidneys/ ureters: A 6T1 hyperintense T2 hypointense exophytic cyst arising from the midpole of the left kidney measures 3.5 cm AP by 5.0 cm transverse by 4.5 cm CC. This corresponds to the hyperdense cyst on the comparison CT examination and exhibits no enhancement following contrast administration consistent with a benign proteinaceous/hemorrhagic cyst. No wall thickening or mural nodularity is identified.  Multiple bilateral additional nonenhancing simple cysts measuring up to 7 cm in the upper pole of  the right kidney, 5.1 cm in the upper pole of the left kidney, and 4.1 cm in the lower pole of the left kidney are also observed. No hydronephrosis or hydroureter is present. Gastrointestinal:  No dilated small or large bowel loops are observed. No free fluid or fluid collections are identified. The appendix is normal. Retroperitoneum / lymph nodes: The aorta is not dilated. No lymphadenopathy is present. Musculoskeletal: The visualized skeletal structures appear intact. 1. A stable benign hemorrhagic as it exits arising from the midpole of the left kidney measures 3.5 x 5.0 x 4.5 cm. No wall thickening, mural nodularity, or abnormal enhancement is observed. Multiple additional benign Bosniak type I simple renal cysts in  each kidney are unchanged. 2. No acute findings. AM      CT ABDOMEN PELVIS W IV CONTRAST Additional Contrast? None    Result Date: 3/14/2022  CT abdomen and pelvis with contrast Comparison: CT,SR - CT ABDOMEN PELVIS WO CONTRAST - 03/13/2022 09:43 PM EDT Findings: Calcified granuloma within the right middle lobe. 4.1 cm hyperdense mass arising from the left kidney. Hounsfield units on the current study estimated at approximately 58, not significantly changed from the prior noncontrast CT. Multiple simple appearing kidney cysts. No calculus or hydronephrosis. Calcified granulomas within the spleen. Unremarkable liver, pancreas and adrenal glands. No calcified gallstones or bile duct dilatation. No bowel obstruction, pneumoperitoneum, or pneumatosis. Heterogeneous borderline size prostate. Unremarkable urinary bladder. Normal appendix. The bones are intact. There is a fluid collection associated with the right iliopsoas muscle measuring approximately 6.6 x 2.7 x 1.7 cm in size. 1. 4.1 cm hemorrhagic cyst within the left kidney. 2. Right-sided iliopsoas bursitis.  This document has been electronically signed by: Ana Luisa London MD on 03/14/2022 12:12 AM All CTs at this facility use dose modulation techniques and iterative reconstructions, and/or weight-based dosing when appropriate to reduce radiation to a low as reasonably achievable.         Last several PSA's:  Lab Results   Component Value Date    PSA 4.37 (H) 03/21/2022       Last total testosterone:  No results found for: TESTOSTERONE    Urinalysis today:  Results for POC orders placed in visit on 04/07/22   POCT Urinalysis No Micro (Auto)   Result Value Ref Range    Glucose, Ur Negative NEGATIVE mg/dl    Bilirubin Urine Negative     Ketones, Urine Negative NEGATIVE    Specific Gravity, Urine 1.020 1.002 - 1.030    Blood, UA POC Trace-intact NEGATIVE    pH, Urine 5.50 5.0 - 9.0    Protein, Urine Negative NEGATIVE mg/dl    Urobilinogen, Urine 0.20 0.0 - 1.0 eu/dl    Nitrite, Urine Negative NEGATIVE    Leukocyte Clumps, Urine Trace (A) NEGATIVE    Color, Urine Yellow YELLOW-STRAW    Character, Urine Clear CLR-SL.CLOUD         Last BUN and creatinine:  Lab Results   Component Value Date    BUN 23 (H) 03/13/2022     Lab Results   Component Value Date    CREATININE 1.1 03/13/2022       Imaging Reviewed during this Office Visit:   (results were independently reviewed by physician and radiology report verified)    PAST MEDICAL, FAMILY AND SOCIAL HISTORY:  Past Medical History:   Diagnosis Date    Hyperlipidemia      Past Surgical History:   Procedure Laterality Date    BACK SURGERY      DIAGNOSTIC CARDIAC CATH LAB PROCEDURE  10/23/2020    2 stents      Family History   Problem Relation Age of Onset    Heart Disease Maternal Grandmother     Heart Disease Maternal Grandfather     Cancer Maternal Grandfather     Heart Disease Paternal Grandmother     Substance Abuse Maternal Uncle      Outpatient Medications Marked as Taking for the 4/7/22 encounter (Procedure visit) with Mariam Higgins MD   Medication Sig Dispense Refill    ticagrelor (BRILINTA) 90 MG TABS tablet take 1 tablet by mouth twice a day 180 tablet 3    carvedilol (COREG) 3.125 MG tablet take 1 tablet by mouth twice a day with meals 180 tablet 3    aspirin (RA ASPIRIN EC ADULT LOW ST) 81 MG EC tablet take 1 tablet by mouth once daily 90 tablet 3    lisinopril (PRINIVIL;ZESTRIL) 5 MG tablet take 1 tablet by mouth once daily 90 tablet 3    atorvastatin (LIPITOR) 80 MG tablet Take 1 tablet by mouth nightly 90 tablet 3    naloxone 4 MG/0.1ML LIQD nasal spray 1 spray by Nasal route as needed for Opioid Reversal 1 each 0    ezetimibe (ZETIA) 10 MG tablet Take 10 mg by mouth daily       nitroGLYCERIN (NITROSTAT) 0.4 MG SL tablet up to max of 3 total doses. If no relief after 1 dose, call 911. 25 tablet 1    albuterol (PROVENTIL) (2.5 MG/3ML) 0.083% nebulizer solution Take 2.5 mg by nebulization every 6 hours as needed for Wheezing      fluticasone (FLONASE) 50 MCG/ACT nasal spray       PROAIR  (90 Base) MCG/ACT inhaler       ADVAIR DISKUS 250-50 MCG/DOSE AEPB          Patient has no known allergies. Social History     Tobacco Use   Smoking Status Former Smoker    Quit date: 2022    Years since quittin.2   Smokeless Tobacco Never Used       Social History     Substance and Sexual Activity   Alcohol Use No    Comment: occasional       REVIEW OF SYSTEMS:  Constitutional: negative  Eyes: negative  Respiratory: negative  Cardiovascular: negative  Gastrointestinal: negative  Musculoskeletal: negative  Genitourinary: negative except for what is in HPI  Skin: negative   Neurological: negative  Hematological/Lymphatic: negative  Psychological: negative    Physical Exam:    This a 61 y.o. male   Vitals:    22 1319   BP: 120/74     Constitutional: Patient in no acute distress; Neuro: alert and oriented to person place and time. Psych: Mood and affect normal.  Skin: Normal    Cystoscopy Operative Note  Surgeon: King Duff M.D, MD   Anesthesia: Urethral 2%  Indications: hematuria  Position: supine  Findings:   The patient was prepped and draped in the usual sterile fashion. The flexible cystoscope was advanced through the urethra and into the bladder.   The bladder was thoroughly inspected and the following was noted:    Residual Urine: Minimal / Moderate / Significant  Urethra: No abnormalities of the urethra are noted. 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 Large prostate, somewhat vascular. Intravesical component  Bladder: No tumors or CIS noted. No bladder diverticulum. Moderate / Severe trabeculation noted. Left floor small tic  Ureters: . Orifices with normal configuration and location. The cystoscope was removed. The patient tolerated the procedure well. Assessment and Plan      1. Gross hematuria    2. Hemorrhage of cyst of native kidney    3. Family history of prostate cancer    4. Elevated PSA          MRI reviewed and confirms cyst; no need for follow up  Cystoscopy as noted above  Some mild urgency, does not wish to start meds at this time  PSA elevated, will repeat after week of abstinence      Prescriptions Ordered:  No orders of the defined types were placed in this encounter.      Orders Placed:  Orders Placed This Encounter   Procedures    PSA, Diagnostic     Standing Status:   Future     Standing Expiration Date:   4/7/2023    POCT Urinalysis No Micro (Auto)    [place] CYSTOURETHROSCOPY            MD Angella Pugh M.D, MD  [facility]

## 2022-05-04 ENCOUNTER — HOSPITAL ENCOUNTER (OUTPATIENT)
Age: 67
Discharge: HOME | End: 2022-05-04
Payer: COMMERCIAL

## 2022-05-04 DIAGNOSIS — R97.20 ELEVATED PSA: ICD-10-CM

## 2022-05-04 DIAGNOSIS — Z80.42 FAMILY HISTORY OF PROSTATE CANCER: ICD-10-CM

## 2022-05-04 LAB — PROSTATE SPECIFIC ANTIGEN: 2.71 NG/ML (ref 0–1)

## 2022-05-04 PROCEDURE — 36415 COLL VENOUS BLD VENIPUNCTURE: CPT

## 2022-05-04 PROCEDURE — 84153 ASSAY OF PSA TOTAL: CPT

## 2022-05-11 ENCOUNTER — TELEMEDICINE (OUTPATIENT)
Dept: UROLOGY | Age: 67
End: 2022-05-11
Payer: COMMERCIAL

## 2022-05-11 DIAGNOSIS — Z80.42 FAMILY HISTORY OF PROSTATE CANCER: ICD-10-CM

## 2022-05-11 DIAGNOSIS — R97.20 ELEVATED PSA: Primary | ICD-10-CM

## 2022-05-11 DIAGNOSIS — N28.89 HEMORRHAGE OF CYST OF NATIVE KIDNEY: ICD-10-CM

## 2022-05-11 DIAGNOSIS — R31.0 GROSS HEMATURIA: ICD-10-CM

## 2022-05-11 DIAGNOSIS — N28.1 HEMORRHAGE OF CYST OF NATIVE KIDNEY: ICD-10-CM

## 2022-05-11 PROCEDURE — 99213 OFFICE O/P EST LOW 20 MIN: CPT | Performed by: UROLOGY

## 2022-05-11 NOTE — PROGRESS NOTES
Priscilla Medina MD   Urology Clinic office Visit      Patient:  Neri Muller  YOB: 1955  Date: 5/11/2022    HISTORY OF PRESENT ILLNESS:   The patient is a 61 y.o. male who presents today for evaluation of the following problem(s):      1. Elevated PSA    2. Family history of prostate cancer    3. Hemorrhage of cyst of native kidney    4. Gross hematuria           Overall the problem(s) : are stable  Associated Symptoms: No dysuria, gross hematuria. Pain Severity:      Summary of old records: N/A  (Patient's old records, notes and chart reviewed and summarized above.)      Gross hematuria, 4 days ago  Painless  No previous episode  No flank pains  Ct was noted to have left 4cm hyperdense mass    Cystoscopy 4/2022: Prostate: Complete obstruction by lateral lobe of prostate. Large prostate, somewhat vascular. Intravesical component  Bladder: No tumors or CIS noted. No bladder diverticulum. Moderate / Severe trabeculation noted. Left floor small tic    No UTIs, no more hematuria  Reviewed PSA  Some urgency, mild      I independently reviewed and verified the images and reports from:    MRI ABDOMEN W WO CONTRAST    Result Date: 4/1/2022  PROCEDURE: MRI ABDOMEN W WO CONTRAST CLINICAL INFORMATION: Hemorrhage of cyst of native kidney. COMPARISON: CT abdomen and pelvis 3/13/2022 TECHNIQUE: Routine MRI abdomen/kidneys without and with IV contrast. CONTRAST: 20 cc ProHance contrast. FINDINGS: Lung bases: Unremarkable. Liver/gallbladder/bilary tree: No radiopaque gallstones or biliary ductal dilatation is identified. No liver lesions are observed. Pancreas: Normal. Spleen : Normal. Adrenal glands: Normal. Kidneys/ ureters: A 6T1 hyperintense T2 hypointense exophytic cyst arising from the midpole of the left kidney measures 3.5 cm AP by 5.0 cm transverse by 4.5 cm CC.  This corresponds to the hyperdense cyst on the comparison CT examination and exhibits no enhancement following contrast administration consistent with a benign proteinaceous/hemorrhagic cyst. No wall thickening or mural nodularity is identified. Multiple bilateral additional nonenhancing simple cysts measuring up to 7 cm in the upper pole of  the right kidney, 5.1 cm in the upper pole of the left kidney, and 4.1 cm in the lower pole of the left kidney are also observed. No hydronephrosis or hydroureter is present. Gastrointestinal:  No dilated small or large bowel loops are observed. No free fluid or fluid collections are identified. The appendix is normal. Retroperitoneum / lymph nodes: The aorta is not dilated. No lymphadenopathy is present. Musculoskeletal: The visualized skeletal structures appear intact. 1. A stable benign hemorrhagic as it exits arising from the midpole of the left kidney measures 3.5 x 5.0 x 4.5 cm. No wall thickening, mural nodularity, or abnormal enhancement is observed. Multiple additional benign Bosniak type I simple renal cysts in  each kidney are unchanged. 2. No acute findings. AM      CT ABDOMEN PELVIS W IV CONTRAST Additional Contrast? None    Result Date: 3/14/2022  CT abdomen and pelvis with contrast Comparison: CT,SR - CT ABDOMEN PELVIS WO CONTRAST - 03/13/2022 09:43 PM EDT Findings: Calcified granuloma within the right middle lobe. 4.1 cm hyperdense mass arising from the left kidney. Hounsfield units on the current study estimated at approximately 58, not significantly changed from the prior noncontrast CT. Multiple simple appearing kidney cysts. No calculus or hydronephrosis. Calcified granulomas within the spleen. Unremarkable liver, pancreas and adrenal glands. No calcified gallstones or bile duct dilatation. No bowel obstruction, pneumoperitoneum, or pneumatosis. Heterogeneous borderline size prostate. Unremarkable urinary bladder. Normal appendix. The bones are intact. There is a fluid collection associated with the right iliopsoas muscle measuring approximately 6.6 x 2.7 x 1.7 cm in size. 1. 4.1 cm hemorrhagic cyst within the left kidney. 2. Right-sided iliopsoas bursitis. This document has been electronically signed by: Gisella Topete MD on 03/14/2022 12:12 AM All CTs at this facility use dose modulation techniques and iterative reconstructions, and/or weight-based dosing when appropriate to reduce radiation to a low as reasonably achievable. Last several PSA's:  Lab Results   Component Value Date    PSA 2.71 (H) 05/04/2022    PSA 4.37 (H) 03/21/2022       Last total testosterone:  No results found for: TESTOSTERONE    Urinalysis today:  No results found for this visit on 05/11/22.       Last BUN and creatinine:  Lab Results   Component Value Date    BUN 23 (H) 03/13/2022     Lab Results   Component Value Date    CREATININE 1.1 03/13/2022       Imaging Reviewed during this Office Visit:   (results were independently reviewed by physician and radiology report verified)    PAST MEDICAL, FAMILY AND SOCIAL HISTORY:  Past Medical History:   Diagnosis Date    Hyperlipidemia      Past Surgical History:   Procedure Laterality Date    BACK SURGERY      DIAGNOSTIC CARDIAC CATH LAB PROCEDURE  10/23/2020    2 stents      Family History   Problem Relation Age of Onset    Heart Disease Maternal Grandmother     Heart Disease Maternal Grandfather     Cancer Maternal Grandfather     Heart Disease Paternal Grandmother     Substance Abuse Maternal Uncle      Outpatient Medications Marked as Taking for the 5/11/22 encounter (Telemedicine) with Laci Zeng MD   Medication Sig Dispense Refill    ticagrelor (BRILINTA) 90 MG TABS tablet take 1 tablet by mouth twice a day 180 tablet 3    carvedilol (COREG) 3.125 MG tablet take 1 tablet by mouth twice a day with meals 180 tablet 3    aspirin (RA ASPIRIN EC ADULT LOW ST) 81 MG EC tablet take 1 tablet by mouth once daily 90 tablet 3    lisinopril (PRINIVIL;ZESTRIL) 5 MG tablet take 1 tablet by mouth once daily 90 tablet 3    atorvastatin (LIPITOR) 80 MG tablet Take 1 tablet by mouth nightly 90 tablet 3    naloxone 4 MG/0.1ML LIQD nasal spray 1 spray by Nasal route as needed for Opioid Reversal 1 each 0    ezetimibe (ZETIA) 10 MG tablet Take 10 mg by mouth daily       nitroGLYCERIN (NITROSTAT) 0.4 MG SL tablet up to max of 3 total doses. If no relief after 1 dose, call 911. 25 tablet 1    albuterol (PROVENTIL) (2.5 MG/3ML) 0.083% nebulizer solution Take 2.5 mg by nebulization every 6 hours as needed for Wheezing      fluticasone (FLONASE) 50 MCG/ACT nasal spray       PROAIR  (90 Base) MCG/ACT inhaler       ADVAIR DISKUS 250-50 MCG/DOSE AEPB          Patient has no known allergies. Social History     Tobacco Use   Smoking Status Former Smoker    Quit date: 2022    Years since quittin.3   Smokeless Tobacco Never Used       Social History     Substance and Sexual Activity   Alcohol Use No    Comment: occasional       REVIEW OF SYSTEMS:  Constitutional: negative  Eyes: negative  Respiratory: negative  Cardiovascular: negative  Gastrointestinal: negative  Musculoskeletal: negative  Genitourinary: negative except for what is in HPI  Skin: negative   Neurological: negative  Hematological/Lymphatic: negative  Psychological: negative    Physical Exam:    This a 61 y.o. male   There were no vitals filed for this visit. Constitutional: Patient in no acute distress; Neuro: alert and oriented to person place and time. Psych: Mood and affect normal.        Assessment and Plan      1. Elevated PSA    2. Family history of prostate cancer    3. Hemorrhage of cyst of native kidney    4. Gross hematuria          MRI reviewed and confirms cyst; no need for follow up  Cystoscopy noted above 2022  Some mild urgency, does not wish to start meds at this time  PSA reviewed  Repeat 6 months      Prescriptions Ordered:  No orders of the defined types were placed in this encounter.      Orders Placed:  Orders Placed This Encounter   Procedures    PSA, Diagnostic     Standing Status:   Future     Standing Expiration Date:   5/11/2023            MD Isidoro James M.D, MD [facility]  Patient being evaluated by a Virtual Visit (video visit) encounter to address concerns as mentioned above. A caregiver was present when appropriate. Due to this being a TeleHealth encounter ([facility] public health emergency), evaluation of the following organ systems was limited: Vitals/Constitutional/EENT/Resp/CV/GI//MS/Neuro/Skin/Heme-Lymph-Imm. Pursuant to the emergency declaration under the [address] [place] [address], [address] [place] [address] authority and the [employer] and [employer] Act, this Virtual Visit was conducted with patient's (and/or legal guardian's) consent, to reduce the patient's risk of exposure to COVID-19 and provide necessary medical care. The patient (and/or legal guardian) has also been advised to contact this office for worsening conditions or problems, and seek emergency medical treatment and/or call 911 if deemed necessary. I was located at my home office. Patient was located at home. Services were provided through a video synchronous discussion virtually to substitute for in-person clinic visit. This encounter was initiated by the patient.

## 2022-06-06 ENCOUNTER — TELEPHONE (OUTPATIENT)
Dept: AUDIOLOGY | Age: 67
End: 2022-06-06

## 2022-06-06 NOTE — TELEPHONE ENCOUNTER
Patient walked-in stating that his right hearing aid  was broken. The hearing aid was given to [facility] to be checked. The patient is still under warranty.

## 2022-06-06 NOTE — TELEPHONE ENCOUNTER
Right hearing aid  replaced under warranty. Recommended patient schedule for a hearing aid check. This was done.

## 2022-06-14 ENCOUNTER — HOSPITAL ENCOUNTER (OUTPATIENT)
Dept: AUDIOLOGY | Age: 67
Discharge: HOME | End: 2022-06-14

## 2022-06-14 PROCEDURE — 9990000010 HC NO CHARGE VISIT: Performed by: AUDIOLOGIST

## 2022-06-14 NOTE — PROGRESS NOTES
ACCOUNT #: [de-identified]      DIAGNOSIS: H90.3    ANNUAL HEARING AID CHECK: Otoscopy was normal.  Listening check of hearing aids revealed good output and norbert sensitivity after cleaning and installation of new  filters and domes. The firmware was updated for both hearing aids. Tap control was deactivated. The [employer] was downloaded to the patient's I phone and his hearing aids were paired. He was instructed on the use of the aron. Will see patient annually, or sooner if problems arise. Annual hearing aid check scheduled for June 13, 2023.

## 2022-09-01 NOTE — TELEPHONE ENCOUNTER
Pt spouse [facility] is calling and states pt is now on medicare and needs his brilinta changed to plavix so the ins will pay. They use [facility] in [place].   Please advise [facility] at [phone]

## 2022-09-01 NOTE — TELEPHONE ENCOUNTER
Dr. Hua Lopez message below. OK to change Plavix? If so does he need a loading dose? Last PCI done in 2020.

## 2022-09-06 RX ORDER — CLOPIDOGREL BISULFATE 75 MG/1
75 TABLET ORAL DAILY
Qty: 90 TABLET | Refills: 3 | Status: SHIPPED | OUTPATIENT
Start: 2022-09-06

## 2022-11-07 ENCOUNTER — HOSPITAL ENCOUNTER (OUTPATIENT)
Age: 67
Discharge: HOME | End: 2022-11-07
Payer: MEDICARE

## 2022-11-07 DIAGNOSIS — R97.20 ELEVATED PSA: ICD-10-CM

## 2022-11-07 DIAGNOSIS — Z80.42 FAMILY HISTORY OF PROSTATE CANCER: ICD-10-CM

## 2022-11-07 PROCEDURE — 36415 COLL VENOUS BLD VENIPUNCTURE: CPT

## 2022-11-07 PROCEDURE — 84153 ASSAY OF PSA TOTAL: CPT

## 2022-11-08 LAB — PROSTATE SPECIFIC ANTIGEN: 3.68 NG/ML (ref 0–1)

## 2022-11-17 ENCOUNTER — OFFICE VISIT (OUTPATIENT)
Dept: UROLOGY | Age: 67
End: 2022-11-17
Payer: MEDICARE

## 2022-11-17 VITALS — RESPIRATION RATE: 16 BRPM | BODY MASS INDEX: 30.35 KG/M2 | HEIGHT: 70 IN | WEIGHT: 212 LBS

## 2022-11-17 DIAGNOSIS — N28.89 HEMORRHAGE OF CYST OF NATIVE KIDNEY: ICD-10-CM

## 2022-11-17 DIAGNOSIS — N28.1 HEMORRHAGE OF CYST OF NATIVE KIDNEY: ICD-10-CM

## 2022-11-17 DIAGNOSIS — N13.8 BPH WITH OBSTRUCTION/LOWER URINARY TRACT SYMPTOMS: ICD-10-CM

## 2022-11-17 DIAGNOSIS — N40.1 BPH WITH OBSTRUCTION/LOWER URINARY TRACT SYMPTOMS: ICD-10-CM

## 2022-11-17 DIAGNOSIS — Z80.42 FAMILY HISTORY OF PROSTATE CANCER: Primary | ICD-10-CM

## 2022-11-17 DIAGNOSIS — R97.20 ELEVATED PSA: ICD-10-CM

## 2022-11-17 DIAGNOSIS — R31.0 GROSS HEMATURIA: ICD-10-CM

## 2022-11-17 PROCEDURE — G8417 CALC BMI ABV UP PARAM F/U: HCPCS | Performed by: UROLOGY

## 2022-11-17 PROCEDURE — 1036F TOBACCO NON-USER: CPT | Performed by: UROLOGY

## 2022-11-17 PROCEDURE — 3017F COLORECTAL CA SCREEN DOC REV: CPT | Performed by: UROLOGY

## 2022-11-17 PROCEDURE — 1123F ACP DISCUSS/DSCN MKR DOCD: CPT | Performed by: UROLOGY

## 2022-11-17 PROCEDURE — G8484 FLU IMMUNIZE NO ADMIN: HCPCS | Performed by: UROLOGY

## 2022-11-17 PROCEDURE — 99214 OFFICE O/P EST MOD 30 MIN: CPT | Performed by: UROLOGY

## 2022-11-17 PROCEDURE — G8427 DOCREV CUR MEDS BY ELIG CLIN: HCPCS | Performed by: UROLOGY

## 2022-11-17 RX ORDER — TAMSULOSIN HYDROCHLORIDE 0.4 MG/1
0.4 CAPSULE ORAL DAILY
Qty: 90 CAPSULE | Refills: 0 | Status: SHIPPED | OUTPATIENT
Start: 2022-11-17

## 2022-11-17 NOTE — PROGRESS NOTES
Jcarlos Herrera MD   Urology Clinic office Visit      Patient:  Madyson Victoria  YOB: 1955  Date: 11/17/2022    HISTORY OF PRESENT ILLNESS:   The patient is a 74 y.o. male who presents today for evaluation of the following problem(s):      1. Family history of prostate cancer    2. Elevated PSA    3. Hemorrhage of cyst of native kidney    4. Gross hematuria    5. BPH with obstruction/lower urinary tract symptoms           Overall the problem(s) : are worse  Associated Symptoms: No dysuria, gross hematuria. Pain Severity:      Summary of old records: N/A  (Patient's old records, notes and chart reviewed and summarized above.)      Gross hematuria, 4 days ago  Painless  No previous episode  No flank pains  Ct was noted to have left 4cm hyperdense mass      Cystoscopy 4/2022: Prostate: Complete obstruction by lateral lobe of prostate. Large prostate, somewhat vascular. Intravesical component  Bladder: No tumors or CIS noted. No bladder diverticulum. Moderate / Severe trabeculation noted. Left floor small tic    No UTIs  Weak stream, frequency, some nocturia  No hematuria      MRI ABDOMEN W WO CONTRAST  Result Date: 4/1/2022  1. A stable benign hemorrhagic as it exits arising from the midpole of the left kidney measures 3.5 x 5.0 x 4.5 cm. No wall thickening, mural nodularity, or abnormal enhancement is observed. Multiple additional benign Bosniak type I simple renal cysts in  each kidney are unchanged. 2. No acute findings. AM      CT ABDOMEN PELVIS W IV CONTRAST Additional Contrast? None  Result Date: 3/14/2022  1. 4.1 cm hemorrhagic cyst within the left kidney. 2. Right-sided iliopsoas bursitis. Last several PSA's:  Lab Results   Component Value Date    PSA 3.68 (H) 11/07/2022    PSA 2.71 (H) 05/04/2022    PSA 4.37 (H) 03/21/2022       Last total testosterone:  No results found for: TESTOSTERONE    Urinalysis today:  No results found for this visit on 11/17/22.       Last BUN and creatinine:  Lab Results   Component Value Date    BUN 23 (H) 03/13/2022     Lab Results   Component Value Date    CREATININE 1.1 03/13/2022       Imaging Reviewed during this Office Visit:   (results were independently reviewed by physician and radiology report verified)    PAST MEDICAL, FAMILY AND SOCIAL HISTORY:  Past Medical History:   Diagnosis Date    Hyperlipidemia      Past Surgical History:   Procedure Laterality Date    BACK SURGERY      DIAGNOSTIC CARDIAC CATH LAB PROCEDURE  10/23/2020    2 stents      Family History   Problem Relation Age of Onset    Heart Disease Maternal Grandmother     Heart Disease Maternal Grandfather     Cancer Maternal Grandfather     Heart Disease Paternal Grandmother     Substance Abuse Maternal Uncle      Outpatient Medications Marked as Taking for the 11/17/22 encounter (Office Visit) with Jason Paul MD   Medication Sig Dispense Refill    tamsulosin (FLOMAX) 0.4 MG capsule Take 1 capsule by mouth daily 90 capsule 0    clopidogrel (PLAVIX) 75 MG tablet Take 1 tablet by mouth daily 90 tablet 3    carvedilol (COREG) 3.125 MG tablet take 1 tablet by mouth twice a day with meals 180 tablet 3    aspirin (RA ASPIRIN EC ADULT LOW ST) 81 MG EC tablet take 1 tablet by mouth once daily 90 tablet 3    lisinopril (PRINIVIL;ZESTRIL) 5 MG tablet take 1 tablet by mouth once daily 90 tablet 3    atorvastatin (LIPITOR) 80 MG tablet Take 1 tablet by mouth nightly 90 tablet 3    naloxone 4 MG/0.1ML LIQD nasal spray 1 spray by Nasal route as needed for Opioid Reversal 1 each 0    ezetimibe (ZETIA) 10 MG tablet Take 10 mg by mouth daily       nitroGLYCERIN (NITROSTAT) 0.4 MG SL tablet up to max of 3 total doses.  If no relief after 1 dose, call 911. 25 tablet 1    albuterol (PROVENTIL) (2.5 MG/3ML) 0.083% nebulizer solution Take 2.5 mg by nebulization every 6 hours as needed for Wheezing      fluticasone (FLONASE) 50 MCG/ACT nasal spray       PROAIR  (90 Base) MCG/ACT inhaler ADVAIR DISKUS 250-50 MCG/DOSE AEPB          Patient has no known allergies. Social History     Tobacco Use   Smoking Status Former    Types: Cigarettes    Quit date: 2022    Years since quittin.8   Smokeless Tobacco Never       Social History     Substance and Sexual Activity   Alcohol Use No    Comment: occasional       REVIEW OF SYSTEMS:  Constitutional: negative  Eyes: negative  Respiratory: negative  Cardiovascular: negative  Gastrointestinal: negative  Musculoskeletal: negative  Genitourinary: negative except for what is in HPI  Skin: negative   Neurological: negative  Hematological/Lymphatic: negative  Psychological: negative    Physical Exam:    This a 74 y.o. male   Vitals:    22 1322   Resp: 16     Constitutional: Patient in no acute distress; Neuro: alert and oriented to person place and time. Psych: Mood and affect normal.        Assessment and Plan      1. Family history of prostate cancer    2. Elevated PSA    3. Hemorrhage of cyst of native kidney    4. Gross hematuria    5. BPH with obstruction/lower urinary tract symptoms          MRI reviewed and confirms cyst; no need for follow up  Cystoscopy noted above 2022  Some mild urgency, does not wish to start meds at this time  PSA reviewed, 3 months  Flomax 0.4 mg po qd for BPH symptoms.     Repeat PSA 3 months      Prescriptions Ordered:  Orders Placed This Encounter   Medications    tamsulosin (FLOMAX) 0.4 MG capsule     Sig: Take 1 capsule by mouth daily     Dispense:  90 capsule     Refill:  0        Orders Placed:  Orders Placed This Encounter   Procedures    PSA, Diagnostic     Standing Status:   Future     Standing Expiration Date:   2023              MD Sol Shi M.D, MD  [facility]

## 2023-01-09 ENCOUNTER — HOSPITAL ENCOUNTER (OUTPATIENT)
Dept: CT IMAGING | Age: 68
Discharge: HOME | End: 2023-01-09
Payer: MEDICARE

## 2023-01-09 DIAGNOSIS — Z87.891 PERSONAL HISTORY OF TOBACCO USE, PRESENTING HAZARDS TO HEALTH: ICD-10-CM

## 2023-01-09 PROCEDURE — 71271 CT THORAX LUNG CANCER SCR C-: CPT

## 2023-01-23 ENCOUNTER — OFFICE VISIT (OUTPATIENT)
Dept: CARDIOLOGY CLINIC | Age: 68
End: 2023-01-23

## 2023-01-23 VITALS
DIASTOLIC BLOOD PRESSURE: 63 MMHG | WEIGHT: 215.25 LBS | SYSTOLIC BLOOD PRESSURE: 114 MMHG | BODY MASS INDEX: 30.82 KG/M2 | HEIGHT: 70 IN | HEART RATE: 51 BPM

## 2023-01-23 DIAGNOSIS — I25.10 CORONARY ARTERY DISEASE DUE TO LIPID RICH PLAQUE: Primary | ICD-10-CM

## 2023-01-23 DIAGNOSIS — I25.83 CORONARY ARTERY DISEASE DUE TO LIPID RICH PLAQUE: Primary | ICD-10-CM

## 2023-01-23 NOTE — PROGRESS NOTES
[address] [place] [address],[place] [address] [address] [place] [address]  Dept: [phone]  Dept Fax: [phone]  Loc: [phone]    Visit Date: 1/23/2023    Mr. Janey Solano is a 74 y.o. male  who presented for:  Chief Complaint   Patient presents with    Check-Up    Coronary Artery Disease       HPI:   73 yo M c hx HLD with STEMI of RCA, EF 55-60%, smoker is here for a follow up. States he is feeling well. Denies any chest pain, sob, palpitations, lightheadedness, dizziness, orthopnea, PND or pedal edema. He wants to try to stop smoking. He recently had cold like symptoms, with cough with some chest pains. Current Outpatient Medications:     tamsulosin (FLOMAX) 0.4 MG capsule, Take 1 capsule by mouth daily, Disp: 90 capsule, Rfl: 0    clopidogrel (PLAVIX) 75 MG tablet, Take 1 tablet by mouth daily, Disp: 90 tablet, Rfl: 3    carvedilol (COREG) 3.125 MG tablet, take 1 tablet by mouth twice a day with meals, Disp: 180 tablet, Rfl: 3    aspirin (RA ASPIRIN EC ADULT LOW ST) 81 MG EC tablet, take 1 tablet by mouth once daily, Disp: 90 tablet, Rfl: 3    lisinopril (PRINIVIL;ZESTRIL) 5 MG tablet, take 1 tablet by mouth once daily, Disp: 90 tablet, Rfl: 3    atorvastatin (LIPITOR) 80 MG tablet, Take 1 tablet by mouth nightly, Disp: 90 tablet, Rfl: 3    naloxone 4 MG/0.1ML LIQD nasal spray, 1 spray by Nasal route as needed for Opioid Reversal, Disp: 1 each, Rfl: 0    ezetimibe (ZETIA) 10 MG tablet, Take 10 mg by mouth daily , Disp: , Rfl:     nitroGLYCERIN (NITROSTAT) 0.4 MG SL tablet, up to max of 3 total doses.  If no relief after 1 dose, call 911., Disp: 25 tablet, Rfl: 1    albuterol (PROVENTIL) (2.5 MG/3ML) 0.083% nebulizer solution, Take 2.5 mg by nebulization every 6 hours as needed for Wheezing, Disp: , Rfl:     fluticasone (FLONASE) 50 MCG/ACT nasal spray, , Disp: , Rfl:     PROAIR  (90 Base) MCG/ACT inhaler, , Disp: , Rfl:     ADVAIR DISKUS 250-50 MCG/DOSE AEPB, , Disp: , Rfl:     Past Medical History  Katie Narvaez  has a past medical history of Hyperlipidemia. Social History  Katie Narvaez  reports that he quit smoking about 12 months ago. He has never used smokeless tobacco. He reports current drug use. Drug: Marijuana Hulon Rajput). He reports that he does not drink alcohol. Family History  Katie Narvaez family history includes Cancer in his maternal grandfather; Heart Disease in his maternal grandfather, maternal grandmother, and paternal grandmother; Substance Abuse in his maternal uncle. Past Surgical History   Past Surgical History:   Procedure Laterality Date    BACK SURGERY      DIAGNOSTIC CARDIAC CATH LAB PROCEDURE  10/23/2020    2 stents        Subjective:     REVIEW OF SYSTEMS  Constitutional: denies sweats, chills and fever  HENT: denies  congestion, sinus pressure, sneezing and sore throat. Eyes: denies  pain, discharge, redness and itching. Respiratory: denies apnea, cough  Gastrointestinal: denies blood in stool, constipation, diarrhea   Endocrine: denies cold intolerance, heat intolerance, polydipsia. Genitourinary: denies dysuria, enuresis, flank pain and hematuria. Musculoskeletal: denies arthralgias, joint swelling and neck pain. Neurological: denies numbness and headaches. Psychiatric/Behavioral: denies agitation, confusion, decreased concentration and dysphoric mood    All others reviewed and are negative. Objective:     /63   Pulse 51   Ht 5' 9.5\" (1.765 m)   Wt 215 lb 4 oz (97.6 kg)   BMI 31.33 kg/m²     Wt Readings from Last 3 Encounters:   01/23/23 215 lb 4 oz (97.6 kg)   11/17/22 212 lb (96.2 kg)   04/07/22 217 lb 11.2 oz (98.7 kg)     BP Readings from Last 3 Encounters:   01/23/23 114/63   04/07/22 120/74   03/17/22 122/74       PHYSICAL EXAM  Constitutional: Oriented to person, place, and time. Appears well-developed and well-nourished. HENT:   Head: Normocephalic and atraumatic.    Eyes: EOM are normal. Pupils are equal, round, and reactive to light. Neck: Normal range of motion. Neck supple. No JVD present. Cardiovascular: bradycardia, normal heart sounds and intact distal pulses. Pulmonary/Chest: Effort normal and breath sounds normal. No respiratory distress. No wheezes. No rales. Abdominal: Soft. Bowel sounds are normal. No distension. There is no tenderness. Musculoskeletal: Normal range of motion. No edema. Neurological: Alert and oriented to person, place, and time. No cranial nerve deficit. Coordination normal.   Skin: Skin is warm and dry. Psychiatric: Normal mood and affect.        No results found for: CKTOTAL, CKMB, CKMBINDEX    Lab Results   Component Value Date/Time    WBC 7.0 03/13/2022 09:00 PM    RBC 4.19 03/13/2022 09:00 PM    HGB 14.0 03/13/2022 09:00 PM    HCT 41.8 03/13/2022 09:00 PM    MCV 99.8 03/13/2022 09:00 PM    MCH 33.4 03/13/2022 09:00 PM    MCHC 33.5 03/13/2022 09:00 PM     03/13/2022 09:00 PM    MPV 10.5 03/13/2022 09:00 PM       Lab Results   Component Value Date/Time     03/13/2022 09:00 PM    K 4.2 03/13/2022 09:00 PM     03/13/2022 09:00 PM    CO2 24 03/13/2022 09:00 PM    BUN 23 03/13/2022 09:00 PM    LABALBU 4.2 03/13/2022 09:00 PM    CREATININE 1.1 03/13/2022 09:00 PM    CALCIUM 9.6 03/13/2022 09:00 PM    LABGLOM 67 03/13/2022 09:00 PM    GLUCOSE 88 03/13/2022 09:00 PM       Lab Results   Component Value Date/Time    ALKPHOS 72 03/13/2022 09:00 PM    ALT 33 03/13/2022 09:00 PM    AST 18 03/13/2022 09:00 PM    PROT 7.1 03/13/2022 09:00 PM    BILITOT 0.6 03/13/2022 09:00 PM    LABALBU 4.2 03/13/2022 09:00 PM       No results found for: MG    No results found for: INR, PROTIME      No results found for: LABA1C    Lab Results   Component Value Date/Time    TRIG 184 10/23/2020 12:07 PM    HDL 38 10/23/2020 12:07 PM    LDLCALC 125 10/23/2020 12:07 PM       No results found for: TSH      Testing Reviewed:      I haveindividually reviewed the below cardiac tests    EKG:    ECHO:   Results for orders placed during the hospital encounter of 10/22/20   ECHO Complete 2D W Doppler W Color    Narrative Transthoracic Echocardiography Report (TTE)     Demographics      Patient Name   [facility]        Gender              Male                  [place]      MR #           [id number]         Race                                                       Ethnicity      Account #      [de-identified]         Room Number         4472      Accession      [id number]        Date of Study       10/23/2020   Number      Date of Birth  1955        Referring Physician Carlito Rose MD      Age            63 year(s)        Sonographer         Yandy Vaca RDCS,                                                        RVT                                       Interpreting        Adama Rose MD                                    Physician     Procedure    Type of Study      TTE procedure:ECHOCARDIOGRAM COMPLETE 2D W DOPPLER W COLOR. Procedure Date  Date: 10/23/2020 Start: 06:28 AM    Study Location: Bedside  Technical Quality: Limited visualization due to poor acoustical window. Indications:STEMI. Additional Medical History:Chest Pain, Marijuana Dependence. Patient Status: Routine    Height: 70 inches Weight: 200 pounds BSA: 2.09 m^2 BMI: 28.7 kg/m^2    BP: 112/76 mmHg    Allergies    - No Known Allergies.      - No Known Allergies. Conclusions      Summary   Technically difficult study due to poor acoustic windows. Left ventricular size is normal and systolic function is moderately   reduced. Ejection fraction was estimated at 40-45%. LV wall thickness is   within normal limits. There is mild inferolateral wall hypokinesis. The right ventricular size appears normal with normal systolic function   and wall thickness. Mild mitral regurgitation is present.       Signature ----------------------------------------------------------------   Electronically signed by Gloria Gonzales MD (Interpreting   physician) on 10/25/2020 at 12:48 PM   ----------------------------------------------------------------      Findings      Mitral Valve   The mitral valve structure is normal with normal leaflet separation. DOPPLER: The transmitral velocity was within the normal range with no   evidence for mitral stenosis. Mild mitral regurgitation is present. Aortic Valve   The aortic valve leaflets were not well visualized. DOPPLER: Transaortic velocity was within the normal range with no evidence   of aortic stenosis. There was no evidence of aortic regurgitation. Tricuspid Valve   The tricuspid valve structure is normal with normal leaflet separation. DOPPLER: There is no evidence of tricuspid stenosis. There was no evidence   of tricuspid regurgitation. Pulmonic Valve   The pulmonic valve was not well visualized. Left Atrium   Left atrial size is normal.      Left Ventricle   Left ventricular size is normal and systolic function is moderately   reduced. Ejection fraction was estimated at 40-45%. LV wall thickness is   within normal limits. There is mild inferolateral wall hypokinesis. Right Atrium   Right atrial size was normal.      Right Ventricle   The right ventricular size appears normal with normal systolic function   and wall thickness. Pericardial Effusion   The pericardium appears normal with no evidence of a pericardial effusion. Pleural Effusion   No evidence of pleural effusion. Aorta / Great Vessels   -Aortic root dimension within normal limits. -IVC size is within normal limits with normal respiratory phasic changes.      M-Mode/2D Measurements & Calculations      LV Diastolic   LV Systolic Dimension:    AV Cusp Separation: 2.9 cmLA   Dimension: 5.1 3.8 cm                    Dimension: 3.6 cmAO Root   cm             LV Volume Diastolic: 08.9 Dimension: 3.6 cmLA Area: 15.6   LV FS:25.5 %   ml                        cm^2   LV PW          LV Volume Systolic: 25.0   Diastolic: 1.4 ml   cm             LV EDV/LV EDV Index: 73.3   Septum         ml/35 m^2LV ESV/LV ESV    RV Diastolic Dimension: 2.6 cm   Diastolic: 1   Index: 48.0 ml/18 m^2   cm             EF Calculated: 47.8 %     LA/Aorta: 1                     LV Length: 8 cm           LA volume/Index: 36.3 ml /17m^2      LV Area   Diastolic:   54.7 cm^2   LV Area   Systolic: 67.0   cm^2     Doppler Measurements & Calculations      MV Peak E-Wave: 62.6  AV Peak Velocity: 140  LVOT Peak Velocity: 91.8 cm/s   cm/s                  cm/s                   LVOT Mean Velocity: 64 cm/s   MV Peak A-Wave: 42.8  AV Peak Gradient: 7.84 LVOT Peak Gradient: 3   cm/s                  mmHg                   mmHgLVOT Mean Gradient: 2   MV E/A Ratio: 1.46    AV Mean Velocity: 86.5 mmHg   MV Peak Gradient:     cm/s   1.57 mmHg             AV Mean Gradient: 4    TV Peak E-Wave: 54.4 cm/s                         mmHg                   TV Peak A-Wave: 38.6 cm/s   MV Deceleration Time: AV VTI: 27.9 cm   373 msec                                     TV Peak Gradient: 1.18 mmHg                            LVOT VTI: 18.6 cm      PV Peak Velocity: 55.3 cm/s   MV E' Septal          IVRT: 116 msec         PV Peak Gradient: 1.22 mmHg   Velocity: 7.7 cm/s   MV A' Septal   Velocity: 8.8 cm/s    AV DVI (VTI): 0.67AV   MV E' Lateral         DVI (Vmax):0.66   Velocity: 10.1 cm/s   MV A' Lateral   Velocity: 11.4 cm/s   E/E' septal: 8.13   E/E' lateral: 6.2   MR Velocity: 481 cm/s     [email or web address] [id number]       STRESS:    CATH:    Assessment/Plan       Diagnosis Orders   1.  Coronary artery disease due to lipid rich plaque  EKG 12 lead        Hx STEMI 10/22/20 LCX and LAD PCI  EF 40-45%--improved 55-60%  Smoker  Sinus bradycardia    EKG with sinus bradycardia at 51 bpm  Reports some dizziness  Will d/c coreg and monitor symptoms for now  Doing well  Will give nicotine patch  Continue Aspirin, plavix. Statin  Continue lisinopirl,   Continue Statin  Reviewed Echo  The patient is asked to make an attempt to improve diet and exercise patterns to aid in medical management of this problem. Advised more plant based nutrition/meditarrean diet   Advised patient to call office or seek immediate medical attention if there is any new onset of  any chest pain, sob, palpitations, lightheadedness, dizziness, orthopnea, PND or pedal edema. All medication side effects were discussed in details. Thank youfor allowing me to participate in the care of this patient. Please do not hesitate to contact me for any further questions. Return in about 6 months (around 7/23/2023), or if symptoms worsen or fail to improve, for Regular follow up, Review testing.        Electronically signed by Jerson Epps MD [facility]  1/23/2023 at 1:18 PM EDT

## 2023-02-08 ENCOUNTER — HOSPITAL ENCOUNTER (OUTPATIENT)
Age: 68
Discharge: HOME | End: 2023-02-08
Payer: MEDICARE

## 2023-02-08 DIAGNOSIS — N13.8 BPH WITH OBSTRUCTION/LOWER URINARY TRACT SYMPTOMS: ICD-10-CM

## 2023-02-08 DIAGNOSIS — R97.20 ELEVATED PSA: ICD-10-CM

## 2023-02-08 DIAGNOSIS — Z80.42 FAMILY HISTORY OF PROSTATE CANCER: ICD-10-CM

## 2023-02-08 DIAGNOSIS — N40.1 BPH WITH OBSTRUCTION/LOWER URINARY TRACT SYMPTOMS: ICD-10-CM

## 2023-02-08 PROCEDURE — 84153 ASSAY OF PSA TOTAL: CPT

## 2023-02-08 PROCEDURE — 36415 COLL VENOUS BLD VENIPUNCTURE: CPT

## 2023-02-09 LAB — PSA SERPL-MCNC: 5.43 NG/ML (ref 0–1)

## 2023-02-16 ENCOUNTER — TELEPHONE (OUTPATIENT)
Dept: UROLOGY | Age: 68
End: 2023-02-16

## 2023-02-16 ENCOUNTER — OFFICE VISIT (OUTPATIENT)
Dept: UROLOGY | Age: 68
End: 2023-02-16
Payer: MEDICARE

## 2023-02-16 VITALS — HEIGHT: 70 IN | RESPIRATION RATE: 16 BRPM | WEIGHT: 215 LBS | BODY MASS INDEX: 30.78 KG/M2

## 2023-02-16 DIAGNOSIS — N13.8 BPH WITH OBSTRUCTION/LOWER URINARY TRACT SYMPTOMS: ICD-10-CM

## 2023-02-16 DIAGNOSIS — Z80.42 FAMILY HISTORY OF PROSTATE CANCER: Primary | ICD-10-CM

## 2023-02-16 DIAGNOSIS — R31.0 GROSS HEMATURIA: ICD-10-CM

## 2023-02-16 DIAGNOSIS — N28.1 HEMORRHAGE OF CYST OF NATIVE KIDNEY: ICD-10-CM

## 2023-02-16 DIAGNOSIS — N40.1 BPH WITH OBSTRUCTION/LOWER URINARY TRACT SYMPTOMS: ICD-10-CM

## 2023-02-16 DIAGNOSIS — R97.20 ELEVATED PSA: ICD-10-CM

## 2023-02-16 DIAGNOSIS — N28.89 HEMORRHAGE OF CYST OF NATIVE KIDNEY: ICD-10-CM

## 2023-02-16 DIAGNOSIS — R97.20 ELEVATED PSA: Primary | ICD-10-CM

## 2023-02-16 PROCEDURE — 1036F TOBACCO NON-USER: CPT | Performed by: UROLOGY

## 2023-02-16 PROCEDURE — G8417 CALC BMI ABV UP PARAM F/U: HCPCS | Performed by: UROLOGY

## 2023-02-16 PROCEDURE — 3017F COLORECTAL CA SCREEN DOC REV: CPT | Performed by: UROLOGY

## 2023-02-16 PROCEDURE — G8484 FLU IMMUNIZE NO ADMIN: HCPCS | Performed by: UROLOGY

## 2023-02-16 PROCEDURE — 1123F ACP DISCUSS/DSCN MKR DOCD: CPT | Performed by: UROLOGY

## 2023-02-16 PROCEDURE — G8428 CUR MEDS NOT DOCUMENT: HCPCS | Performed by: UROLOGY

## 2023-02-16 PROCEDURE — 99214 OFFICE O/P EST MOD 30 MIN: CPT | Performed by: UROLOGY

## 2023-02-16 NOTE — TELEPHONE ENCOUNTER
Patient scheduled for MRI prostate  at [facility] on 3/7/23.     Order mailed with instructions or given to the patient in the office

## 2023-02-16 NOTE — PROGRESS NOTES
Walt Teague MD   Urology Clinic office Visit      Patient:  Christy Masterson  YOB: 1955  Date: 2/16/2023    HISTORY OF PRESENT ILLNESS:   The patient is a 74 y.o. male who presents today for evaluation of the following problem(s):      1. Family history of prostate cancer    2. Elevated PSA    3. Hemorrhage of cyst of native kidney    4. Gross hematuria    5. BPH with obstruction/lower urinary tract symptoms           Overall the problem(s) : are worse  Associated Symptoms: No dysuria, gross hematuria. Pain Severity:      Summary of old records: N/A  (Patient's old records, notes and chart reviewed and summarized above.)      Gross hematuria, 4 days ago  Painless  No previous episode  No flank pains  Ct was noted to have left 4cm hyperdense mass      Cystoscopy 4/2022: Prostate: Complete obstruction by lateral lobe of prostate. Large prostate, somewhat vascular. Intravesical component  Bladder: No tumors or CIS noted. No bladder diverticulum. Moderate / Severe trabeculation noted. Left floor small tic    No UTIs  Weak stream, frequency, some nocturia  No hematuria      MRI ABDOMEN W WO CONTRAST  Result Date: 4/1/2022  1. A stable benign hemorrhagic as it exits arising from the midpole of the left kidney measures 3.5 x 5.0 x 4.5 cm. No wall thickening, mural nodularity, or abnormal enhancement is observed. Multiple additional benign Bosniak type I simple renal cysts in  each kidney are unchanged. 2. No acute findings. AM      CT ABDOMEN PELVIS W IV CONTRAST Additional Contrast? None  Result Date: 3/14/2022  1. 4.1 cm hemorrhagic cyst within the left kidney. 2. Right-sided iliopsoas bursitis. Last several PSA's:  Lab Results   Component Value Date    PSA 5.43 (H) 02/08/2023    PSA 3.68 (H) 11/07/2022    PSA 2.71 (H) 05/04/2022       Last total testosterone:  No results found for: TESTOSTERONE    Urinalysis today:  No results found for this visit on 02/16/23.       Last BUN and creatinine:  Lab Results   Component Value Date    BUN 23 (H) 2022     Lab Results   Component Value Date    CREATININE 1.1 2022       Imaging Reviewed during this Office Visit:   (results were independently reviewed by physician and radiology report verified)    PAST MEDICAL, FAMILY AND SOCIAL HISTORY:  Past Medical History:   Diagnosis Date    Hyperlipidemia      Past Surgical History:   Procedure Laterality Date    BACK SURGERY      DIAGNOSTIC CARDIAC CATH LAB PROCEDURE  10/23/2020    2 stents      Family History   Problem Relation Age of Onset    Heart Disease Maternal Grandmother     Heart Disease Maternal Grandfather     Cancer Maternal Grandfather     Heart Disease Paternal Grandmother     Substance Abuse Maternal Uncle      No outpatient medications have been marked as taking for the 23 encounter (Office Visit) with Frankey Poster, MD.       Patient has no known allergies. Social History     Tobacco Use   Smoking Status Former    Types: Cigarettes    Quit date: 2022    Years since quittin.1   Smokeless Tobacco Never       Social History     Substance and Sexual Activity   Alcohol Use No    Comment: occasional       REVIEW OF SYSTEMS:  Constitutional: negative  Eyes: negative  Respiratory: negative  Cardiovascular: negative  Gastrointestinal: negative  Musculoskeletal: negative  Genitourinary: negative except for what is in HPI  Skin: negative   Neurological: negative  Hematological/Lymphatic: negative  Psychological: negative    Physical Exam:    This a 74 y.o. male   Vitals:    23 0810   Resp: 16     Constitutional: Patient in no acute distress; Neuro: alert and oriented to person place and time. Psych: Mood and affect normal.        Assessment and Plan      1. Family history of prostate cancer    2. Elevated PSA    3. Hemorrhage of cyst of native kidney    4. Gross hematuria    5.  BPH with obstruction/lower urinary tract symptoms          MRI reviewed and confirms cyst; no need for follow up  Cystoscopy noted above 4/2022  Some mild urgency, does not wish to start meds at this time    Flomax 0.4 mg po qd for BPH symptoms. PSA rising  Check MRI soon  Will likely need biopsy, if PIRADS 3 or higher  Risks: I discussed all the risks, benefits, alternatives and possible complications or surgery as well as expectations and post-op recovery. Prescriptions Ordered:  No orders of the defined types were placed in this encounter. Orders Placed:  Orders Placed This Encounter   Procedures    MRI PROSTATE W WO CONTRAST     Standing Status:   Future     Standing Expiration Date:   2/16/2024     Order Specific Question:   STAT Creatinine as needed:     Answer:   No     Order Specific Question:   What is the sedation requirement?      Answer:   MD Alex Burton M.D, MD [facility]

## 2023-02-17 ENCOUNTER — TELEPHONE (OUTPATIENT)
Dept: UROLOGY | Age: 68
End: 2023-02-17

## 2023-02-17 RX ORDER — TAMSULOSIN HYDROCHLORIDE 0.4 MG/1
0.4 CAPSULE ORAL DAILY
Qty: 30 CAPSULE | Refills: 3 | Status: SHIPPED | OUTPATIENT
Start: 2023-02-17

## 2023-02-28 ENCOUNTER — HOSPITAL ENCOUNTER (OUTPATIENT)
Age: 68
Discharge: HOME | End: 2023-02-28
Payer: MEDICARE

## 2023-02-28 DIAGNOSIS — R97.20 ELEVATED PSA: ICD-10-CM

## 2023-02-28 LAB
CREAT SERPL-MCNC: 1 MG/DL (ref 0.4–1.2)
GFR SERPL CREATININE-BSD FRML MDRD: > 60 ML/MIN/1.73M2

## 2023-02-28 PROCEDURE — 82565 ASSAY OF CREATININE: CPT

## 2023-02-28 PROCEDURE — 36415 COLL VENOUS BLD VENIPUNCTURE: CPT

## 2023-03-07 ENCOUNTER — HOSPITAL ENCOUNTER (OUTPATIENT)
Dept: MRI IMAGING | Age: 68
Discharge: HOME | End: 2023-03-07
Payer: MEDICARE

## 2023-03-07 DIAGNOSIS — R97.20 ELEVATED PSA: ICD-10-CM

## 2023-03-07 DIAGNOSIS — Z80.42 FAMILY HISTORY OF PROSTATE CANCER: ICD-10-CM

## 2023-03-07 PROCEDURE — 6360000004 HC RX CONTRAST MEDICATION: Performed by: UROLOGY

## 2023-03-07 PROCEDURE — 72197 MRI PELVIS W/O & W/DYE: CPT

## 2023-03-07 PROCEDURE — A9579 GAD-BASE MR CONTRAST NOS,1ML: HCPCS | Performed by: UROLOGY

## 2023-03-07 RX ADMIN — GADOTERIDOL 20 ML: 279.3 INJECTION, SOLUTION INTRAVENOUS at 11:20

## 2023-03-08 ENCOUNTER — OFFICE VISIT (OUTPATIENT)
Dept: UROLOGY | Age: 68
End: 2023-03-08
Payer: MEDICARE

## 2023-03-08 VITALS — BODY MASS INDEX: 30.78 KG/M2 | RESPIRATION RATE: 16 BRPM | WEIGHT: 215 LBS | HEIGHT: 70 IN

## 2023-03-08 DIAGNOSIS — N13.8 BPH WITH OBSTRUCTION/LOWER URINARY TRACT SYMPTOMS: ICD-10-CM

## 2023-03-08 DIAGNOSIS — R97.20 ELEVATED PSA: ICD-10-CM

## 2023-03-08 DIAGNOSIS — N40.1 BPH WITH OBSTRUCTION/LOWER URINARY TRACT SYMPTOMS: ICD-10-CM

## 2023-03-08 DIAGNOSIS — Z80.42 FAMILY HISTORY OF PROSTATE CANCER: Primary | ICD-10-CM

## 2023-03-08 PROCEDURE — G8427 DOCREV CUR MEDS BY ELIG CLIN: HCPCS

## 2023-03-08 PROCEDURE — 3017F COLORECTAL CA SCREEN DOC REV: CPT

## 2023-03-08 PROCEDURE — 99214 OFFICE O/P EST MOD 30 MIN: CPT

## 2023-03-08 PROCEDURE — 1123F ACP DISCUSS/DSCN MKR DOCD: CPT

## 2023-03-08 PROCEDURE — G8417 CALC BMI ABV UP PARAM F/U: HCPCS

## 2023-03-08 PROCEDURE — 1036F TOBACCO NON-USER: CPT

## 2023-03-08 PROCEDURE — G8484 FLU IMMUNIZE NO ADMIN: HCPCS

## 2023-03-08 RX ORDER — TAMSULOSIN HYDROCHLORIDE 0.4 MG/1
0.4 CAPSULE ORAL DAILY
Qty: 30 CAPSULE | Refills: 5 | Status: SHIPPED | OUTPATIENT
Start: 2023-03-08

## 2023-03-08 NOTE — PROGRESS NOTES
[address] [facility] [address]  Dept: [phone]  Loc: [phone]  Visit Date: 3/8/2023    ILIA Peralta is a 74 y.o. male that presents to the urology clinic to review MRI of the Prostate results. Dr. Shanita Galvin patient who at last visit was noted to have an elevation in PSA from 3.68 (11/7/22) to 5.43 (2/8/23). Progressed to MRI of the Prostate for workup. Patient here to discuss results. MRI favorable. Revealed a single PI-RADS 2 lesion in the transitional zone of the prostate. Patient denies recent change in urination, unexpected weight loss, fatigue, or new back/bone pain. Most recent PSA: 5.43 (2/8/23)    Previous Records     Cystoscopy (4/2022)  Prostate  Complete obstruction by lateral lobe of prostate. Large prostate, somewhat vascular. Intravesical component. Bladder  No tumors or CIS noted. No bladder diverticulum. Moderate / Severe trabeculation noted. I independently reviewed and verified the images and reports from:    MRI PROSTATE W WO CONTRAST    Result Date: 3/7/2023  PROCEDURE: MRI PROSTATE W WO CONTRAST CLINICAL INFORMATION: Family history of prostate cancer, Elevated PSA COMPARISON: CT abdomen and pelvis 3/13/2022 TECHNIQUE: Axial T2, coronal T2 and sagittal T2 imaging of the prostate gland. Large field of view axial T2 imaging of the prostate gland. Axial T1, axial T1 VIBE dynamic post michael and axial T1 VIBE dynamic subtracted post michael images through the prostate gland. Diffusion 50, 800, 1400 and ADC maps through the prostate gland. Axial T1 STAR VIBE post michael through the pelvis. 3-D images reconstructed on a separate [employer]* Cad workstation with MRI TRUS fusion of prostate mass lesions. CONTRAST: 20  mL of ProHance  intravenously. LABORATORY: 1. PSA on 2/8/2023 was 5.43 ng/ml 2. PSA on 11/7/2022 was 3.68 ng/ml 3. PSA on 5/12/2022 was 2.71 ng/ml 4.  PSA on 3/21/2022 was 4.37 ng/ml FINDINGS: PROSTATE SIZE: (As measured on [employer] cad workstation) 1. The prostate gland is mild to moderately enlarged measuring 5.2 x 4.2 x 5.3 cm 2. The prostate volume is 56.27 ml. TRANSITIONAL ZONE: 1. Nodular heterogeneous appearance of the transitional zone (PI-RADS 2) 2. Enlarged transitional zone CENTRAL ZONE: 1. Unremarkable. PERIPHERAL ZONE: 1. Markedly atrophic and compressed peripheral zone. 2. No areas of restricted diffusion seen in the peripheral zone. SEMINAL VESICLES: 1. Atrophic seminal vesicles NEUROVASCULAR BUNDLES: Intact URINARY BLADDER/RECTUM/PELVIC DIAPHRAGM: 1. A small left posterolateral bladder diverticulum is noted. 2. The rectum and urogenital diaphragm are unremarkable PATHOLOGICALLY ENLARGED LYMPH NODES: 1. No significantly enlarged lymph node is seen in the visualized pelvis OSSEOUS STRUCTURES: 1. No suspicious osseous lesion. OTHER: 1. A 6.6 x 2 x 3.3 cm collection is seen related to the right iliopsoas muscle that reflects marked iliopsoas bursitis. 2. Small fat-containing inguinal hernias are seen bilaterally. 1. Heterogeneous enlarged and nodular transitional zone (PI-RADS 2)     2. Mild to moderate prostatomegaly     3. Large right iliopsoas bursitis. 4. A small left posterolateral bladder diverticulum     5. No significantly enlarged lymph node is seen in the visualized pelvis     **PI-RADS v 2.1 assessment categories** PI-RADS 1: Very low (clinically significant cancer is highly unlikely to be present) PI-RADS 2: Low (clinically significant cancer is unlikely to be present) PI-RADS 3: Intermediate (the presence of clinically significant cancer is equivocal) PI-RADS 4: High (clinically significant cancer is likely to be present) PI-RADS-5: Very high (clinically significant cancer is highly likely to be present) **This report has been created using voice recognition software. It may contain minor errors which are inherent in voice recognition technology. **     Final report electronically signed by Dr Miguelito Price on 3/7/2023 6:02 PM        Last total testosterone:  No results found for: TESTOSTERONE      Last BUN and creatinine:  Lab Results   Component Value Date    BUN 23 (H) 03/13/2022     Lab Results   Component Value Date    CREATININE 1.0 02/28/2023           PAST MEDICAL, FAMILY AND SOCIAL HISTORY UPDATE:  Past Medical History:   Diagnosis Date    Hyperlipidemia      Past Surgical History:   Procedure Laterality Date    BACK SURGERY      DIAGNOSTIC CARDIAC CATH LAB PROCEDURE  10/23/2020    2 stents      Family History   Problem Relation Age of Onset    Heart Disease Maternal Grandmother     Heart Disease Maternal Grandfather     Cancer Maternal Grandfather     Heart Disease Paternal Grandmother     Substance Abuse Maternal Uncle      Outpatient Medications Marked as Taking for the 3/8/23 encounter (Office Visit) with Shelley Otero PA-C   Medication Sig Dispense Refill    tamsulosin (FLOMAX) 0.4 MG capsule Take 1 capsule by mouth daily 30 capsule 3    clopidogrel (PLAVIX) 75 MG tablet Take 1 tablet by mouth daily 90 tablet 3    aspirin (RA ASPIRIN EC ADULT LOW ST) 81 MG EC tablet take 1 tablet by mouth once daily 90 tablet 3    lisinopril (PRINIVIL;ZESTRIL) 5 MG tablet take 1 tablet by mouth once daily 90 tablet 3    atorvastatin (LIPITOR) 80 MG tablet Take 1 tablet by mouth nightly 90 tablet 3    naloxone 4 MG/0.1ML LIQD nasal spray 1 spray by Nasal route as needed for Opioid Reversal 1 each 0    ezetimibe (ZETIA) 10 MG tablet Take 10 mg by mouth daily       nitroGLYCERIN (NITROSTAT) 0.4 MG SL tablet up to max of 3 total doses.  If no relief after 1 dose, call 911. 25 tablet 1    albuterol (PROVENTIL) (2.5 MG/3ML) 0.083% nebulizer solution Take 2.5 mg by nebulization every 6 hours as needed for Wheezing      fluticasone (FLONASE) 50 MCG/ACT nasal spray       PROAIR  (90 Base) MCG/ACT inhaler       ADVAIR DISKUS 250-50 MCG/DOSE AEPB          Patient has no known allergies. Social History     Tobacco Use   Smoking Status Former    Types: Cigarettes    Quit date: 2022    Years since quittin.1   Smokeless Tobacco Never       Social History     Substance and Sexual Activity   Alcohol Use No    Comment: occasional       REVIEW OF SYSTEMS:  Constitutional: negative  Eyes: negative  Respiratory: negative  Cardiovascular: negative  Gastrointestinal: negative  Musculoskeletal: negative  Genitourinary: negative except for what is in HPI  Skin: negative   Neurological: negative  Hematological/Lymphatic: negative  Psychological: negative    Physical Exam:      Vitals:    23 0928   Resp: 16     Patient is a 74 y.o. male in no acute distress and alert and oriented to person, place and time. Pulmonary: Non-labored respiration. Cardiovascular: Normal rate, regular rhythm, normal peripheral pulses. Skin: Warm and dry. Psych: Normal mood and affect. Genitourinary: Bladder non-distended and non-tender. Assessment and Plan   Family History of Prostate CA  Elevated PSA  - 5.43 (23) acute elevation from previous at 3.68 (23). Previous high of 4.37.   - Father ~age 80. Brother has had workup for elevated PSA with no findings of prostate CA.   - MRI of the Prostate shows a single PI-RADS 2 lesion in the transitional zone. Otherwise enlarged, but unremarkable. - Will continue to watch closely. PSA in 6 months. Follow-up with Dr. Nate Osborn. 2.   BPH w/ LUTS  - Symptoms stable. Continue Flomax 0.4 mg Daily. Refilled. *Follow-up in 6 months with Dr. Nate Osborn.        Renata Gonzalez PA-C  Urology

## 2023-03-27 ENCOUNTER — TELEPHONE (OUTPATIENT)
Dept: CARDIOLOGY CLINIC | Age: 68
End: 2023-03-27

## 2023-03-27 NOTE — TELEPHONE ENCOUNTER
Pt wife called said that pt has been experiencing rectal bleeding, they are in the process of getting a colonoscopy but are asking if he should be holding any medications at this time?      [phone]

## 2023-05-05 ENCOUNTER — HOSPITAL ENCOUNTER (OUTPATIENT)
Dept: AUDIOLOGY | Age: 68
Discharge: HOME | End: 2023-05-05

## 2023-05-05 PROCEDURE — V5014 HEARING AID REPAIR/MODIFYING: HCPCS | Performed by: AUDIOLOGIST

## 2023-05-05 NOTE — PROGRESS NOTES
ACCOUNT #: [de-identified]    DIAGNOSIS: H90.3    HEARING AID PROBLEM: WALK-IN. Bella Lockhart P70-R x2 (2020). Patient c/o left  wire broken. Cracked at housing. Able to retrieve  plug from housing. Suctioned debris. Cleaned and checked both hearing aids. Replaced 0M (L)  and both large open domes. Aids working on listening check. Patient confirmed. Reviewed proper removal technique to reduce stress on receivers. Patient expressed understanding. Cancelled annual cl/ck visit for next month with [email or web address] since maintenance performed today. R/S for next year but will call if any problems arise sooner. Billed $100 for new .

## 2023-05-31 ENCOUNTER — TELEPHONE (OUTPATIENT)
Dept: CARDIOLOGY CLINIC | Age: 68
End: 2023-05-31

## 2023-06-01 NOTE — TELEPHONE ENCOUNTER
Kelvin Vail called requesting a refill on the following medications:  Requested Prescriptions     Pending Prescriptions Disp Refills    atorvastatin (LIPITOR) 80 MG tablet 90 tablet 3     Sig: Take 1 tablet by mouth nightly     Pharmacy verified: [address] [facility]      Date of last visit: 1/23/2023  Date of next visit (if applicable): 7/87/3034

## 2023-06-02 RX ORDER — ATORVASTATIN CALCIUM 80 MG/1
80 TABLET, FILM COATED ORAL NIGHTLY
Qty: 90 TABLET | Refills: 3 | Status: SHIPPED | OUTPATIENT
Start: 2023-06-02

## 2023-06-19 RX ORDER — LISINOPRIL 5 MG/1
TABLET ORAL
Qty: 90 TABLET | Refills: 3 | Status: SHIPPED | OUTPATIENT
Start: 2023-06-19

## 2023-07-31 ENCOUNTER — OFFICE VISIT (OUTPATIENT)
Dept: CARDIOLOGY CLINIC | Age: 68
End: 2023-07-31
Payer: MEDICARE

## 2023-07-31 VITALS
HEIGHT: 70 IN | HEART RATE: 72 BPM | WEIGHT: 212.6 LBS | BODY MASS INDEX: 30.43 KG/M2 | SYSTOLIC BLOOD PRESSURE: 132 MMHG | DIASTOLIC BLOOD PRESSURE: 75 MMHG

## 2023-07-31 DIAGNOSIS — I25.83 CORONARY ARTERY DISEASE DUE TO LIPID RICH PLAQUE: Primary | ICD-10-CM

## 2023-07-31 DIAGNOSIS — I25.10 CORONARY ARTERY DISEASE DUE TO LIPID RICH PLAQUE: Primary | ICD-10-CM

## 2023-07-31 PROCEDURE — 1036F TOBACCO NON-USER: CPT | Performed by: INTERNAL MEDICINE

## 2023-07-31 PROCEDURE — G8417 CALC BMI ABV UP PARAM F/U: HCPCS | Performed by: INTERNAL MEDICINE

## 2023-07-31 PROCEDURE — G8427 DOCREV CUR MEDS BY ELIG CLIN: HCPCS | Performed by: INTERNAL MEDICINE

## 2023-07-31 PROCEDURE — 3017F COLORECTAL CA SCREEN DOC REV: CPT | Performed by: INTERNAL MEDICINE

## 2023-07-31 PROCEDURE — 99214 OFFICE O/P EST MOD 30 MIN: CPT | Performed by: INTERNAL MEDICINE

## 2023-07-31 PROCEDURE — 1123F ACP DISCUSS/DSCN MKR DOCD: CPT | Performed by: INTERNAL MEDICINE

## 2023-07-31 RX ORDER — PANTOPRAZOLE SODIUM 40 MG/1
40 TABLET, DELAYED RELEASE ORAL 2 TIMES DAILY
COMMUNITY
Start: 2023-07-05

## 2023-07-31 NOTE — PROGRESS NOTES
[facility] [address] [place]  Dept: [phone]  Dept Fax: [phone]  Loc: [phone]    Visit Date: 7/31/2023    Mr. Yao Poon is a 74 y.o. male  who presented for:  Chief Complaint   Patient presents with    6 Month Follow-Up     No cardiac complaints        HPI:   75 yo M c hx HLD with STEMI of RCA, EF 55-60%, smoker is here for a follow up. States he is feeling well. Denies any chest pain, sob, palpitations, lightheadedness, dizziness, orthopnea, PND or pedal edema. He wants to try to stop smoking. Current Outpatient Medications:     pantoprazole (PROTONIX) 40 MG tablet, Take 1 tablet by mouth 2 times daily, Disp: , Rfl:     lisinopril (PRINIVIL;ZESTRIL) 5 MG tablet, take 1 tablet by mouth once daily, Disp: 90 tablet, Rfl: 3    atorvastatin (LIPITOR) 80 MG tablet, Take 1 tablet by mouth nightly, Disp: 90 tablet, Rfl: 3    clopidogrel (PLAVIX) 75 MG tablet, Take 1 tablet by mouth daily, Disp: 90 tablet, Rfl: 3    tamsulosin (FLOMAX) 0.4 MG capsule, Take 1 capsule by mouth daily, Disp: 30 capsule, Rfl: 5    aspirin (RA ASPIRIN EC ADULT LOW ST) 81 MG EC tablet, take 1 tablet by mouth once daily, Disp: 90 tablet, Rfl: 3    ezetimibe (ZETIA) 10 MG tablet, Take 1 tablet by mouth daily, Disp: , Rfl:     nitroGLYCERIN (NITROSTAT) 0.4 MG SL tablet, up to max of 3 total doses. If no relief after 1 dose, call 911., Disp: 25 tablet, Rfl: 1    albuterol (PROVENTIL) (2.5 MG/3ML) 0.083% nebulizer solution, Take 3 mLs by nebulization every 6 hours as needed for Wheezing, Disp: , Rfl:     fluticasone (FLONASE) 50 MCG/ACT nasal spray, , Disp: , Rfl:     PROAIR  (90 Base) MCG/ACT inhaler, , Disp: , Rfl:     ADVAIR DISKUS 250-50 MCG/DOSE AEPB, , Disp: , Rfl:     Past Medical History  Ligia Moscoso  has a past medical history of Hyperlipidemia. Social History  Ligia Moscoso  reports that he quit smoking about 18 months ago.

## 2023-09-08 ENCOUNTER — HOSPITAL ENCOUNTER (OUTPATIENT)
Age: 68
Discharge: HOME | End: 2023-09-08
Payer: MEDICARE

## 2023-09-08 DIAGNOSIS — R97.20 ELEVATED PSA: ICD-10-CM

## 2023-09-08 LAB — PSA SERPL-MCNC: 5.87 NG/ML (ref 0–1)

## 2023-09-08 PROCEDURE — 36415 COLL VENOUS BLD VENIPUNCTURE: CPT

## 2023-09-08 PROCEDURE — 84153 ASSAY OF PSA TOTAL: CPT

## 2023-09-12 ENCOUNTER — OFFICE VISIT (OUTPATIENT)
Dept: UROLOGY | Age: 68
End: 2023-09-12
Payer: MEDICARE

## 2023-09-12 VITALS — HEIGHT: 70 IN | WEIGHT: 206 LBS | BODY MASS INDEX: 29.49 KG/M2 | RESPIRATION RATE: 16 BRPM

## 2023-09-12 DIAGNOSIS — R97.20 ELEVATED PSA: ICD-10-CM

## 2023-09-12 DIAGNOSIS — Z80.42 FAMILY HISTORY OF PROSTATE CANCER: Primary | ICD-10-CM

## 2023-09-12 DIAGNOSIS — N13.8 BPH WITH OBSTRUCTION/LOWER URINARY TRACT SYMPTOMS: ICD-10-CM

## 2023-09-12 DIAGNOSIS — N40.1 BPH WITH OBSTRUCTION/LOWER URINARY TRACT SYMPTOMS: ICD-10-CM

## 2023-09-12 PROCEDURE — 99214 OFFICE O/P EST MOD 30 MIN: CPT

## 2023-09-12 PROCEDURE — 1123F ACP DISCUSS/DSCN MKR DOCD: CPT

## 2023-09-12 PROCEDURE — 1036F TOBACCO NON-USER: CPT

## 2023-09-12 PROCEDURE — G8427 DOCREV CUR MEDS BY ELIG CLIN: HCPCS

## 2023-09-12 PROCEDURE — 3017F COLORECTAL CA SCREEN DOC REV: CPT

## 2023-09-12 PROCEDURE — G8417 CALC BMI ABV UP PARAM F/U: HCPCS

## 2023-09-12 RX ORDER — TAMSULOSIN HYDROCHLORIDE 0.4 MG/1
0.4 CAPSULE ORAL DAILY
Qty: 90 CAPSULE | Refills: 3 | Status: SHIPPED | OUTPATIENT
Start: 2023-09-12

## 2023-09-12 NOTE — PROGRESS NOTES
[address]  [address]  [facility] [address]  Dept: [phone]  Loc: [phone]  Visit Date: 2023    ILIA Garcia is a 74 y.o. male that presents to the urology clinic for elevated PSA, BPH workup. PSA steadily increasing. Has had acute elevations resolved with antibiotics in the past. Favorable MRI done 2023 with PI-RADS 2 findings. Patient opted to defer TRUS biopsy and recheck PSA. Up to 5.87 (new high) from 5.43. Family history of prostate cancer in his father- 80's at time of diagnosis. Urinary symptoms otherwise stable and satisfactory on Flomax 0.4 mg QD. Most Recent PSA: 5.87 (23)      Last BUN and creatinine:  Lab Results   Component Value Date    BUN 23 (H) 2022     Lab Results   Component Value Date    CREATININE 1.0 2023           PAST MEDICAL, FAMILY AND SOCIAL HISTORY UPDATE:  Past Medical History:   Diagnosis Date    Hyperlipidemia      Past Surgical History:   Procedure Laterality Date    BACK SURGERY      BAND HEMORRHOIDECTOMY      DIAGNOSTIC CARDIAC CATH LAB PROCEDURE  10/23/2020    2 stents      Family History   Problem Relation Age of Onset    Heart Disease Maternal Grandmother     Heart Disease Maternal Grandfather     Cancer Maternal Grandfather     Heart Disease Paternal Grandmother     Substance Abuse Maternal Uncle      No outpatient medications have been marked as taking for the 23 encounter (Office Visit) with Mark Lees PA-C. Patient has no known allergies.   Social History     Tobacco Use   Smoking Status Former    Types: Cigarettes    Quit date: 2022    Years since quittin.6   Smokeless Tobacco Never       Social History     Substance and Sexual Activity   Alcohol Use No    Comment: occasional       REVIEW OF SYSTEMS:  Constitutional: negative  Eyes: negative  Respiratory: negative  Cardiovascular: negative  Gastrointestinal:

## 2023-10-11 ENCOUNTER — SCHEDULED TELEPHONE ENCOUNTER (OUTPATIENT)
Dept: UROLOGY | Age: 68
End: 2023-10-11
Payer: MEDICARE

## 2023-10-11 DIAGNOSIS — N40.1 BPH WITH OBSTRUCTION/LOWER URINARY TRACT SYMPTOMS: ICD-10-CM

## 2023-10-11 DIAGNOSIS — R97.20 ELEVATED PSA: ICD-10-CM

## 2023-10-11 DIAGNOSIS — Z80.42 FAMILY HISTORY OF PROSTATE CANCER: Primary | ICD-10-CM

## 2023-10-11 DIAGNOSIS — N13.8 BPH WITH OBSTRUCTION/LOWER URINARY TRACT SYMPTOMS: ICD-10-CM

## 2023-10-11 PROCEDURE — 99442 PR PHYS/QHP TELEPHONE EVALUATION 11-20 MIN: CPT

## 2023-10-11 NOTE — PROGRESS NOTES
Scheduled Telephone Encounter  8/2/2022  [facility]  Aleah Wu  Urology   Reason for Visit: Elevated PSA     Progress Notes  Elvin Flores PA-C   Urology  Raymond Jeffery is a 45-year-old male being evaluated via telephone on 10/11/23 for Elevated PSA. HPI  PSA steadily increasing. Has had acute elevations resolved with antibiotics in the past. Favorable MRI done March of 2023 with PI-RADS 2 findings. Patient opted to defer TRUS biopsy and recheck PSA. Up to 5.87 (new high) from 5.43. ExoDx done following his last visit returned as slightly elevated at 16.52. Family history of prostate cancer in his father- 80's at time of diagnosis. Urinary symptoms otherwise stable and satisfactory on Flomax 0.4 mg QD. Most Recent PSA: 5.87 (9/8/23)    ExoDx Results (9/18/23)      Assessment/Plan    Elevated PSA  Family History of Prostate Cancer  - Slightly elevated ExoDx score at 16.52. Discussed results and implications with [facility] in detail. Patient is not ready to proceed with Biopsy given findings. If needing Biopsy, would prefer that he be under.   - Recheck PSA (Free and Total) in 6 months per patient preference. - If elevated past current level then patient would be agreeable to biopsy. BPH w/ LUTS  - Stable LUTS on Flomax 0.4 mg once daily. Patient is satisfied with his symptoms at this time. Total Time: 12 Minutes     Raymond Jeffery was evaluated through a synchronous (real-time) audio encounter. Patient identification was verified at the start of the visit. He/She (or guardian if applicable) is aware that this is a billable service, which includes applicable co-pays. This visit was conducted with the patient's (and/or legal guardian's) verbal consent. He/She has not had a related appointment within my department in the past 7 days or scheduled within the next 24 hours.    The patient was located at Home: [address]  The provider

## 2024-04-03 ENCOUNTER — HOSPITAL ENCOUNTER (OUTPATIENT)
Age: 69
Discharge: HOME | End: 2024-04-03
Payer: MEDICARE

## 2024-04-03 DIAGNOSIS — R97.20 ELEVATED PSA: ICD-10-CM

## 2024-04-03 PROCEDURE — 84153 ASSAY OF PSA TOTAL: CPT

## 2024-04-03 PROCEDURE — 84154 ASSAY OF PSA FREE: CPT

## 2024-04-03 PROCEDURE — 36415 COLL VENOUS BLD VENIPUNCTURE: CPT

## 2024-04-06 LAB — PROSTATE SPECIFIC ANTIGEN FREE: NORMAL

## 2024-04-11 ENCOUNTER — OFFICE VISIT (OUTPATIENT)
Dept: UROLOGY | Age: 69
End: 2024-04-11
Payer: MEDICARE

## 2024-04-11 VITALS — WEIGHT: 206 LBS | RESPIRATION RATE: 14 BRPM | BODY MASS INDEX: 30.51 KG/M2 | HEIGHT: 69 IN

## 2024-04-11 DIAGNOSIS — N40.1 BPH WITH OBSTRUCTION/LOWER URINARY TRACT SYMPTOMS: Primary | ICD-10-CM

## 2024-04-11 DIAGNOSIS — R97.20 ELEVATED PSA: ICD-10-CM

## 2024-04-11 DIAGNOSIS — Z80.42 FAMILY HISTORY OF PROSTATE CANCER: ICD-10-CM

## 2024-04-11 DIAGNOSIS — N13.8 BPH WITH OBSTRUCTION/LOWER URINARY TRACT SYMPTOMS: Primary | ICD-10-CM

## 2024-04-11 PROCEDURE — 1036F TOBACCO NON-USER: CPT

## 2024-04-11 PROCEDURE — 3017F COLORECTAL CA SCREEN DOC REV: CPT

## 2024-04-11 PROCEDURE — G8417 CALC BMI ABV UP PARAM F/U: HCPCS

## 2024-04-11 PROCEDURE — G8427 DOCREV CUR MEDS BY ELIG CLIN: HCPCS

## 2024-04-11 PROCEDURE — 99213 OFFICE O/P EST LOW 20 MIN: CPT

## 2024-04-11 PROCEDURE — 1123F ACP DISCUSS/DSCN MKR DOCD: CPT

## 2024-04-11 PROCEDURE — 81003 URINALYSIS AUTO W/O SCOPE: CPT

## 2024-04-11 RX ORDER — TAMSULOSIN HYDROCHLORIDE 0.4 MG/1
0.4 CAPSULE ORAL DAILY
Qty: 90 CAPSULE | Refills: 3 | Status: SHIPPED | OUTPATIENT
Start: 2024-04-11

## 2024-04-11 NOTE — PROGRESS NOTES
[facility] [place] [facility]  [facility] UROLOGY  [address]  [address]  [facility] [address]  Dept: [phone]  Loc: [phone]  Visit Date: 2024      HPI  Milton Davis is a 68 y.o. male that presents to the urology clinic for BPH and prostate cancer screening follow-up.    PSA down vs last check. Has had acute elevations resolved with antibiotics in the past. Favorable MRI done 2023 with PI-RADS 2 findings. Patient opted to defer TRUS biopsy and recheck PSA. Down on most recent check to 4.70, previously in mid to high 5's on his past two checks.     Family history of prostate cancer in his father- 80's at time of diagnosis.     Urinary symptoms otherwise stable and satisfactory on Flomax 0.4 mg QD.     PSA Trend  4.70   (24)  5.87   (23)  5.43   (23)  3.68   (22)  2.71   (22)     ExoDx Results (23)          Last BUN and Creatinine:  Lab Results   Component Value Date    BUN 23 (H) 2022     Lab Results   Component Value Date    CREATININE 1.0 2023         PAST MEDICAL, FAMILY AND SOCIAL HISTORY UPDATE:  Past Medical History:   Diagnosis Date    Hyperlipidemia      Past Surgical History:   Procedure Laterality Date    BACK SURGERY      BAND HEMORRHOIDECTOMY      DIAGNOSTIC CARDIAC CATH LAB PROCEDURE  10/23/2020    2 stents      Family History   Problem Relation Age of Onset    Heart Disease Maternal Grandmother     Heart Disease Maternal Grandfather     Cancer Maternal Grandfather     Heart Disease Paternal Grandmother     Substance Abuse Maternal Uncle      No outpatient medications have been marked as taking for the 24 encounter (Office Visit) with Alvin Otero PA-C.       Patient has no known allergies.  Social History     Tobacco Use   Smoking Status Former    Current packs/day: 0.00    Types: Cigarettes    Quit date: 2022    Years since quittin.2   Smokeless Tobacco Never       Social History

## 2024-05-01 NOTE — PATIENT INSTRUCTIONS
Your  Nurses  Today:  Renetta    Your Provider for Today: Dr. De Jesus    You may receive a survey regarding the care you received during your visit.  Your input is valuable to us.  We encourage you to complete and return your survey.  We hope you will choose us in the future for your healthcare needs.

## 2024-05-02 ENCOUNTER — OFFICE VISIT (OUTPATIENT)
Dept: CARDIOLOGY CLINIC | Age: 69
End: 2024-05-02
Payer: MEDICARE

## 2024-05-02 VITALS
SYSTOLIC BLOOD PRESSURE: 108 MMHG | HEART RATE: 75 BPM | BODY MASS INDEX: 29.4 KG/M2 | DIASTOLIC BLOOD PRESSURE: 64 MMHG | WEIGHT: 198.5 LBS | HEIGHT: 69 IN

## 2024-05-02 DIAGNOSIS — I25.10 CORONARY ARTERY DISEASE DUE TO LIPID RICH PLAQUE: Primary | ICD-10-CM

## 2024-05-02 DIAGNOSIS — I25.83 CORONARY ARTERY DISEASE DUE TO LIPID RICH PLAQUE: Primary | ICD-10-CM

## 2024-05-02 DIAGNOSIS — R07.9 CHEST PAIN IN ADULT: ICD-10-CM

## 2024-05-02 PROCEDURE — G8427 DOCREV CUR MEDS BY ELIG CLIN: HCPCS | Performed by: INTERNAL MEDICINE

## 2024-05-02 PROCEDURE — 99214 OFFICE O/P EST MOD 30 MIN: CPT | Performed by: INTERNAL MEDICINE

## 2024-05-02 PROCEDURE — G8417 CALC BMI ABV UP PARAM F/U: HCPCS | Performed by: INTERNAL MEDICINE

## 2024-05-02 PROCEDURE — 1036F TOBACCO NON-USER: CPT | Performed by: INTERNAL MEDICINE

## 2024-05-02 PROCEDURE — 93000 ELECTROCARDIOGRAM COMPLETE: CPT | Performed by: INTERNAL MEDICINE

## 2024-05-02 PROCEDURE — 1123F ACP DISCUSS/DSCN MKR DOCD: CPT | Performed by: INTERNAL MEDICINE

## 2024-05-02 PROCEDURE — 3017F COLORECTAL CA SCREEN DOC REV: CPT | Performed by: INTERNAL MEDICINE

## 2024-05-02 RX ORDER — DUPILUMAB 300 MG/2ML
300 INJECTION, SOLUTION SUBCUTANEOUS
COMMUNITY

## 2024-05-02 NOTE — PROGRESS NOTES
Pt here for check up     Pt continues with chest pain, describes as a twinge in chest     EKG done today

## 2024-05-02 NOTE — PROGRESS NOTES
[facility] CARDIOLOGY  [address]  [address]  [facility] [address]  Dept: [phone]  Dept Fax: [phone]  Loc: [phone]    Visit Date: 5/2/2024    Mr. Davis is a 68 y.o. male  who presented for:  No chief complaint on file.      HPI:   67 yo M c hx HLD with STEMI of RCA, EF 55-60%, smoker is here for a follow up. States he is feeling well.  Denies any chest pain, sob, palpitations, lightheadedness, dizziness, orthopnea, PND or pedal edema.              Current Outpatient Medications:     dupilumab (DUPIXENT) 300 MG/2ML SOPN injection, Inject 2 mLs into the skin every 14 days, Disp: , Rfl:     tamsulosin (FLOMAX) 0.4 MG capsule, Take 1 capsule by mouth daily, Disp: 90 capsule, Rfl: 3    pantoprazole (PROTONIX) 40 MG tablet, Take 1 tablet by mouth 2 times daily, Disp: , Rfl:     lisinopril (PRINIVIL;ZESTRIL) 5 MG tablet, take 1 tablet by mouth once daily, Disp: 90 tablet, Rfl: 3    atorvastatin (LIPITOR) 80 MG tablet, Take 1 tablet by mouth nightly, Disp: 90 tablet, Rfl: 3    clopidogrel (PLAVIX) 75 MG tablet, Take 1 tablet by mouth daily, Disp: 90 tablet, Rfl: 3    aspirin (RA ASPIRIN EC ADULT LOW ST) 81 MG EC tablet, take 1 tablet by mouth once daily, Disp: 90 tablet, Rfl: 3    ezetimibe (ZETIA) 10 MG tablet, Take 1 tablet by mouth daily, Disp: , Rfl:     nitroGLYCERIN (NITROSTAT) 0.4 MG SL tablet, up to max of 3 total doses. If no relief after 1 dose, call 911., Disp: 25 tablet, Rfl: 1    albuterol (PROVENTIL) (2.5 MG/3ML) 0.083% nebulizer solution, Take 3 mLs by nebulization every 6 hours as needed for Wheezing, Disp: , Rfl:     fluticasone (FLONASE) 50 MCG/ACT nasal spray, , Disp: , Rfl:     PROAIR  (90 Base) MCG/ACT inhaler, , Disp: , Rfl:     ADVAIR DISKUS 250-50 MCG/DOSE AEPB, , Disp: , Rfl:     Past Medical History  Milton  has a past medical history of Hyperlipidemia.    Social History  Milton  reports that he quit smoking about 2

## 2024-05-21 RX ORDER — CLOPIDOGREL BISULFATE 75 MG/1
75 TABLET ORAL DAILY
Qty: 90 TABLET | Refills: 3 | Status: SHIPPED | OUTPATIENT
Start: 2024-05-21

## 2024-05-21 NOTE — TELEPHONE ENCOUNTER
Milton Davis called requesting a refill on the following medications:  Requested Prescriptions     Pending Prescriptions Disp Refills    clopidogrel (PLAVIX) 75 MG tablet 90 tablet 3     Sig: Take 1 tablet by mouth daily     Pharmacy verified: [facility] on [address]  .pv      Date of last visit: 5/02/2024  Date of next visit (if applicable): 5/08/2025

## 2024-06-10 ENCOUNTER — HOSPITAL ENCOUNTER (OUTPATIENT)
Dept: AUDIOLOGY | Age: 69
Discharge: HOME | End: 2024-06-10

## 2024-06-10 PROCEDURE — 92593 HC HEARING AID CHECK, BOTH EARS: CPT | Performed by: AUDIOLOGIST

## 2024-06-10 PROCEDURE — V5267 HEARING AID SUP/ACCESS/DEV: HCPCS | Performed by: AUDIOLOGIST

## 2024-06-10 NOTE — PROGRESS NOTES
ACCOUNT #: [id number]      DIAGNOSIS: H90.3    ANNUAL HEARING AID CHECK (Phonak Leightoneo P70-R x2 2020):Overall patient doing well with his hearing aids. Has noticed several time that his hearing aids battery life is an hour or two shorter than typical otherwise, no concerns. Otoscopy was clear, bilaterally. Both hearing aids were cleaned.Mics and receivers vacuumed. New cerustop filters installed. Firmware was updated. No issues noted with listening checke.  Will see patient annually, or sooner if problems arise. Dispensed 48 filters. Patient billed for service and filters.

## 2024-08-15 RX ORDER — ATORVASTATIN CALCIUM 80 MG/1
80 TABLET, FILM COATED ORAL NIGHTLY
Qty: 90 TABLET | Refills: 3 | Status: SHIPPED | OUTPATIENT
Start: 2024-08-15

## 2024-08-15 NOTE — TELEPHONE ENCOUNTER
Milton Davis called requesting a refill on the following medications:  Requested Prescriptions     Pending Prescriptions Disp Refills    atorvastatin (LIPITOR) 80 MG tablet 90 tablet 3     Sig: Take 1 tablet by mouth nightly     Pharmacy verified: [facility] on [address]  .pv      Date of last visit: 5/02/2024  Date of next visit (if applicable): 5/08/2025

## 2024-09-11 ENCOUNTER — HOSPITAL ENCOUNTER (EMERGENCY)
Age: 69
Discharge: HOME | End: 2024-09-11
Payer: MEDICARE

## 2024-09-11 VITALS
DIASTOLIC BLOOD PRESSURE: 66 MMHG | RESPIRATION RATE: 18 BRPM | SYSTOLIC BLOOD PRESSURE: 113 MMHG | OXYGEN SATURATION: 94 % | TEMPERATURE: 96.8 F | HEART RATE: 77 BPM

## 2024-09-11 DIAGNOSIS — S61.211A LACERATION OF LEFT INDEX FINGER WITHOUT FOREIGN BODY WITHOUT DAMAGE TO NAIL, INITIAL ENCOUNTER: Primary | ICD-10-CM

## 2024-09-11 PROCEDURE — 12002 RPR S/N/AX/GEN/TRNK2.6-7.5CM: CPT

## 2024-09-11 PROCEDURE — 90715 TDAP VACCINE 7 YRS/> IM: CPT

## 2024-09-11 PROCEDURE — 6370000000 HC RX 637 (ALT 250 FOR IP)

## 2024-09-11 PROCEDURE — 6360000002 HC RX W HCPCS

## 2024-09-11 PROCEDURE — 90471 IMMUNIZATION ADMIN: CPT

## 2024-09-11 PROCEDURE — 99203 OFFICE O/P NEW LOW 30 MIN: CPT

## 2024-09-11 PROCEDURE — 99213 OFFICE O/P EST LOW 20 MIN: CPT

## 2024-09-11 PROCEDURE — 2500000003 HC RX 250 WO HCPCS

## 2024-09-11 RX ORDER — LIDOCAINE HYDROCHLORIDE 10 MG/ML
5 INJECTION, SOLUTION INFILTRATION; PERINEURAL ONCE
Status: COMPLETED | OUTPATIENT
Start: 2024-09-11 | End: 2024-09-11

## 2024-09-11 RX ORDER — CEPHALEXIN 500 MG/1
500 CAPSULE ORAL 2 TIMES DAILY
Qty: 14 CAPSULE | Refills: 0 | Status: SHIPPED | OUTPATIENT
Start: 2024-09-11 | End: 2024-09-18

## 2024-09-11 RX ORDER — BACITRACIN ZINC 500 [USP'U]/G
OINTMENT TOPICAL ONCE
Status: COMPLETED | OUTPATIENT
Start: 2024-09-11 | End: 2024-09-11

## 2024-09-11 RX ADMIN — LIDOCAINE HYDROCHLORIDE 5 ML: 10 INJECTION, SOLUTION INFILTRATION; PERINEURAL at 10:28

## 2024-09-11 RX ADMIN — TETANUS TOXOID, REDUCED DIPHTHERIA TOXOID AND ACELLULAR PERTUSSIS VACCINE, ADSORBED 0.5 ML: 5; 2.5; 8; 8; 2.5 SUSPENSION INTRAMUSCULAR at 10:29

## 2024-09-11 RX ADMIN — BACITRACIN ZINC: 500 OINTMENT TOPICAL at 10:53

## 2024-09-11 ASSESSMENT — ENCOUNTER SYMPTOMS
COUGH: 0
SHORTNESS OF BREATH: 0

## 2024-09-11 ASSESSMENT — PAIN - FUNCTIONAL ASSESSMENT: PAIN_FUNCTIONAL_ASSESSMENT: NONE - DENIES PAIN

## 2024-11-12 RX ORDER — LISINOPRIL 5 MG/1
TABLET ORAL
Qty: 90 TABLET | Refills: 3 | OUTPATIENT
Start: 2024-11-12

## 2024-11-12 NOTE — TELEPHONE ENCOUNTER
Milton is requesting a refill of their   Requested Prescriptions     Pending Prescriptions Disp Refills    lisinopril (PRINIVIL;ZESTRIL) 5 MG tablet [Pharmacy Med Name: Lisinopril 5 MG Oral Tablet] 90 tablet 0     Sig: Take 1 tablet by mouth once daily   . Please advise.      Last Appt:  5/2/2024  Next Appt:   5/8/2025  Preferred pharmacy:   [facility] [address] [place] [address] [address] - P [phone] - F [phone]

## 2024-11-18 RX ORDER — LISINOPRIL 5 MG/1
TABLET ORAL
Qty: 90 TABLET | Refills: 2 | OUTPATIENT
Start: 2024-11-18

## 2024-12-13 ENCOUNTER — APPOINTMENT (OUTPATIENT)
Dept: GENERAL RADIOLOGY | Age: 69
End: 2024-12-13
Payer: MEDICARE

## 2024-12-13 ENCOUNTER — HOSPITAL ENCOUNTER (EMERGENCY)
Age: 69
Discharge: HOME | End: 2024-12-13
Payer: MEDICARE

## 2024-12-13 VITALS
RESPIRATION RATE: 20 BRPM | HEART RATE: 62 BPM | BODY MASS INDEX: 27.92 KG/M2 | SYSTOLIC BLOOD PRESSURE: 139 MMHG | DIASTOLIC BLOOD PRESSURE: 76 MMHG | HEIGHT: 70 IN | OXYGEN SATURATION: 99 % | WEIGHT: 195 LBS | TEMPERATURE: 97.6 F

## 2024-12-13 DIAGNOSIS — S39.012A STRAIN OF LUMBAR REGION, INITIAL ENCOUNTER: Primary | ICD-10-CM

## 2024-12-13 DIAGNOSIS — M47.816 LUMBAR SPONDYLOSIS: ICD-10-CM

## 2024-12-13 DIAGNOSIS — M46.1 DEGENERATIVE JOINT DISEASE OF SACROILIAC JOINT (HCC): ICD-10-CM

## 2024-12-13 PROCEDURE — 96372 THER/PROPH/DIAG INJ SC/IM: CPT

## 2024-12-13 PROCEDURE — 99213 OFFICE O/P EST LOW 20 MIN: CPT

## 2024-12-13 PROCEDURE — 6360000002 HC RX W HCPCS

## 2024-12-13 PROCEDURE — 72100 X-RAY EXAM L-S SPINE 2/3 VWS: CPT

## 2024-12-13 RX ORDER — LIDOCAINE 50 MG/G
1 PATCH TOPICAL DAILY
Qty: 10 PATCH | Refills: 0 | Status: SHIPPED | OUTPATIENT
Start: 2024-12-13 | End: 2024-12-23

## 2024-12-13 RX ORDER — METHYLPREDNISOLONE ACETATE 80 MG/ML
80 INJECTION, SUSPENSION INTRA-ARTICULAR; INTRALESIONAL; INTRAMUSCULAR; SOFT TISSUE ONCE
Status: COMPLETED | OUTPATIENT
Start: 2024-12-13 | End: 2024-12-13

## 2024-12-13 RX ORDER — CYCLOBENZAPRINE HCL 10 MG
10 TABLET ORAL 3 TIMES DAILY PRN
Qty: 12 TABLET | Refills: 0 | Status: SHIPPED | OUTPATIENT
Start: 2024-12-13

## 2024-12-13 RX ORDER — ORPHENADRINE CITRATE 30 MG/ML
60 INJECTION INTRAMUSCULAR; INTRAVENOUS ONCE
Status: COMPLETED | OUTPATIENT
Start: 2024-12-13 | End: 2024-12-13

## 2024-12-13 RX ADMIN — METHYLPREDNISOLONE ACETATE 80 MG: 80 INJECTION, SUSPENSION INTRA-ARTICULAR; INTRALESIONAL; INTRAMUSCULAR; SOFT TISSUE at 10:40

## 2024-12-13 RX ADMIN — ORPHENADRINE CITRATE 60 MG: 60 INJECTION INTRAMUSCULAR; INTRAVENOUS at 10:40

## 2024-12-13 ASSESSMENT — PAIN DESCRIPTION - PAIN TYPE: TYPE: ACUTE PAIN

## 2024-12-13 ASSESSMENT — PAIN DESCRIPTION - LOCATION: LOCATION: BACK

## 2024-12-13 ASSESSMENT — PAIN - FUNCTIONAL ASSESSMENT
PAIN_FUNCTIONAL_ASSESSMENT: PREVENTS OR INTERFERES WITH MANY ACTIVE NOT PASSIVE ACTIVITIES
PAIN_FUNCTIONAL_ASSESSMENT: 0-10

## 2024-12-13 ASSESSMENT — PAIN DESCRIPTION - FREQUENCY: FREQUENCY: CONTINUOUS

## 2024-12-13 ASSESSMENT — PAIN DESCRIPTION - DESCRIPTORS: DESCRIPTORS: ACHING;SHARP;STABBING

## 2024-12-13 ASSESSMENT — ENCOUNTER SYMPTOMS: BACK PAIN: 1

## 2024-12-13 ASSESSMENT — PAIN SCALES - GENERAL: PAINLEVEL_OUTOF10: 4

## 2024-12-13 ASSESSMENT — PAIN DESCRIPTION - ORIENTATION: ORIENTATION: RIGHT;LOWER

## 2024-12-13 ASSESSMENT — PAIN DESCRIPTION - ONSET: ONSET: GRADUAL

## 2024-12-13 NOTE — DISCHARGE INSTRUCTIONS
Rest  Ice or heat  Stretches  Muscle relaxers  Lidocaine patches  Follow up with PCP  OIO if symptoms worsen  ER if numbness, tingling, or loss of bowel or bladder function occurs

## 2024-12-13 NOTE — ED NOTES
Pt ambulates slowly with slight limp.  Denies numbness or tingling in extremities or loss of bowel or bladder control.       Sagar Garcia RN  12/13/24 [id number]

## 2024-12-13 NOTE — ED NOTES
Discharge instructions and prescriptions reviewed with pt. Pt verbalized understanding. Pt ambulated out in stable condition.  Assessment unchanged upon discharge.     Gia Reed RN  12/13/24 [id number]

## 2024-12-13 NOTE — ED PROVIDER NOTES
TABLET    Take 1 tablet by mouth daily    FLUTICASONE (FLONASE) 50 MCG/ACT NASAL SPRAY        LISINOPRIL (PRINIVIL;ZESTRIL) 5 MG TABLET    take 1 tablet by mouth once daily    NITROGLYCERIN (NITROSTAT) 0.4 MG SL TABLET    up to max of 3 total doses. If no relief after 1 dose, call 911.    PANTOPRAZOLE (PROTONIX) 40 MG TABLET    Take 1 tablet by mouth 2 times daily    PROAIR  (90 BASE) MCG/ACT INHALER        TAMSULOSIN (FLOMAX) 0.4 MG CAPSULE    Take 1 capsule by mouth daily       ALLERGIES     Patient is has No Known Allergies.    Patients   Immunization History   Administered Date(s) Administered    COVID-19, MODERNA BLUE border, Primary or Immunocompromised, (age 12y+), IM, 100 mcg/0.5mL 03/22/2021, 04/19/2021, 12/22/2021    COVID-19, MODERNA Bivalent, (age 12y+), IM, 50 mcg/0.5 mL 10/10/2022, 06/20/2023    COVID-19, MODERNA, (age 12y+), IM, 50mcg/0.5mL 10/18/2023    Influenza A (I9P8-92) Vaccine PF IM 02/01/2010    TDaP, ADACEL (age 10y-64y), BOOSTRIX (age 10y+), IM, 0.5mL 09/11/2024       FAMILY HISTORY     Patient's family history includes Cancer in his father and maternal grandfather; Heart Disease in his maternal grandfather, maternal grandmother, mother, and paternal grandmother; Substance Abuse in his maternal uncle.    SOCIAL HISTORY     Patient  reports that he quit smoking about 2 years ago. His smoking use included cigarettes. He has never used smokeless tobacco. He reports current alcohol use. He reports current drug use. Drug: Marijuana (Weed).    PHYSICAL EXAM     ED TRIAGE VITALS  BP: 139/76, Temp: 97.6 °F (36.4 °C), Pulse: 62, Respirations: 20, SpO2: 99 %,Estimated body mass index is 28.38 kg/m² as calculated from the following:    Height as of this encounter: 1.765 m (5' 9.5\").    Weight as of this encounter: 88.5 kg (195 lb).,No LMP for male patient.    Physical Exam  Vitals and nursing note reviewed.   Constitutional:       Appearance: Normal appearance. He is normal weight.  MD  [address] [facility] [address] / [place] [address]      DISCHARGE MEDICATIONS:  New Prescriptions    CYCLOBENZAPRINE (FLEXERIL) 10 MG TABLET    Take 1 tablet by mouth 3 times daily as needed for Muscle spasms    LIDOCAINE (LIDODERM) 5 %    Place 1 patch onto the skin daily for 10 days 12 hours on, 12 hours off.       Discontinued Medications    No medications on file       Current Discharge Medication List          JON Mccarty CNP    (Please note that portions of this note were completed with a voice recognition program. Efforts were made to edit the dictations but occasionally words are mis-transcribed.)            Johana Lopez APRN - CNP  12/13/24 [id number]

## 2025-04-03 ENCOUNTER — HOSPITAL ENCOUNTER (OUTPATIENT)
Age: 70
Discharge: HOME | End: 2025-04-03
Payer: MEDICARE

## 2025-04-03 DIAGNOSIS — R97.20 ELEVATED PSA: ICD-10-CM

## 2025-04-03 DIAGNOSIS — N13.8 BPH WITH OBSTRUCTION/LOWER URINARY TRACT SYMPTOMS: ICD-10-CM

## 2025-04-03 DIAGNOSIS — N40.1 BPH WITH OBSTRUCTION/LOWER URINARY TRACT SYMPTOMS: ICD-10-CM

## 2025-04-03 LAB — PSA SERPL-MCNC: 4.22 NG/ML (ref 0–1)

## 2025-04-03 PROCEDURE — 84153 ASSAY OF PSA TOTAL: CPT

## 2025-04-03 PROCEDURE — 36415 COLL VENOUS BLD VENIPUNCTURE: CPT

## 2025-04-10 ENCOUNTER — OFFICE VISIT (OUTPATIENT)
Dept: UROLOGY | Age: 70
End: 2025-04-10
Payer: MEDICARE

## 2025-04-10 VITALS
SYSTOLIC BLOOD PRESSURE: 112 MMHG | BODY MASS INDEX: 26.94 KG/M2 | HEIGHT: 70 IN | DIASTOLIC BLOOD PRESSURE: 64 MMHG | WEIGHT: 188.2 LBS

## 2025-04-10 DIAGNOSIS — R97.20 ELEVATED PSA: ICD-10-CM

## 2025-04-10 DIAGNOSIS — Z80.42 FAMILY HISTORY OF PROSTATE CANCER: ICD-10-CM

## 2025-04-10 DIAGNOSIS — N40.1 BPH WITH OBSTRUCTION/LOWER URINARY TRACT SYMPTOMS: Primary | ICD-10-CM

## 2025-04-10 DIAGNOSIS — N13.8 BPH WITH OBSTRUCTION/LOWER URINARY TRACT SYMPTOMS: Primary | ICD-10-CM

## 2025-04-10 PROCEDURE — G8427 DOCREV CUR MEDS BY ELIG CLIN: HCPCS

## 2025-04-10 PROCEDURE — 3017F COLORECTAL CA SCREEN DOC REV: CPT

## 2025-04-10 PROCEDURE — 1123F ACP DISCUSS/DSCN MKR DOCD: CPT

## 2025-04-10 PROCEDURE — G8417 CALC BMI ABV UP PARAM F/U: HCPCS

## 2025-04-10 PROCEDURE — 1159F MED LIST DOCD IN RCRD: CPT

## 2025-04-10 PROCEDURE — 1036F TOBACCO NON-USER: CPT

## 2025-04-10 PROCEDURE — 99213 OFFICE O/P EST LOW 20 MIN: CPT

## 2025-04-10 RX ORDER — TAMSULOSIN HYDROCHLORIDE 0.4 MG/1
0.4 CAPSULE ORAL DAILY
Qty: 90 CAPSULE | Refills: 3 | Status: SHIPPED | OUTPATIENT
Start: 2025-04-10

## 2025-04-10 NOTE — PROGRESS NOTES
allergies.  Social History     Tobacco Use   Smoking Status Former    Current packs/day: 0.00    Types: Cigarettes    Quit date: 01/2022    Years since quitting: 3.2   Smokeless Tobacco Never       Social History     Substance and Sexual Activity   Alcohol Use Yes    Comment: occasional       REVIEW OF SYSTEMS:  Constitutional: negative  Eyes: negative  Respiratory: negative  Cardiovascular: negative  Gastrointestinal: negative  Musculoskeletal: negative  Genitourinary: negative except for what is in HPI  Skin: negative   Neurological: negative  Hematological/Lymphatic: negative  Psychological: negative    Physical Exam:      There were no vitals filed for this visit.    Patient is a 69 y.o. male in no acute distress and alert and oriented to person, place and time.    Pulmonary: Non-labored respiration.  Cardiovascular: Normal rate, regular rhythm, normal peripheral pulses.  Skin: Warm and dry.  Psych: Normal mood and affect.  Genitourinary: Bladder non-distended and non-tender.      Assessment and Plan   Elevated PSA  Family History of Prostate Cancer  - PSA down on most recent check to 4.22.  - Slightly elevated ExoDx score at 16.52. Patient deferred biopsy at his last visit.   - Continue annual surveillance.    BPH w/ LUTS  - Stable LUTS on Flomax 0.4 mg once daily. IPSS of 7/1.  - Patient is satisfied with his symptoms at this time.  - Continue Flomax. Refills sent.    *Follow-up in 1 year with PSA prior.    Alvin Otero PA-C  Urology

## 2025-05-07 NOTE — PATIENT INSTRUCTIONS
Your Provider for Today: Dr. De Jesus  Your nurses for today: Aaron    You may receive a survey regarding the care you received during your visit.  Your input is valuable to us.  We encourage you to complete and return your survey.  We hope you will choose us in the future for your healthcare needs.

## 2025-05-08 ENCOUNTER — HOSPITAL ENCOUNTER (EMERGENCY)
Age: 70
Discharge: HOME | End: 2025-05-08
Attending: EMERGENCY MEDICINE
Payer: MEDICARE

## 2025-05-08 ENCOUNTER — OFFICE VISIT (OUTPATIENT)
Dept: CARDIOLOGY CLINIC | Age: 70
End: 2025-05-08
Payer: MEDICARE

## 2025-05-08 ENCOUNTER — APPOINTMENT (OUTPATIENT)
Dept: CT IMAGING | Age: 70
End: 2025-05-08
Payer: MEDICARE

## 2025-05-08 VITALS
DIASTOLIC BLOOD PRESSURE: 70 MMHG | BODY MASS INDEX: 27.11 KG/M2 | SYSTOLIC BLOOD PRESSURE: 136 MMHG | WEIGHT: 189.4 LBS | HEART RATE: 70 BPM | HEIGHT: 70 IN

## 2025-05-08 VITALS
DIASTOLIC BLOOD PRESSURE: 77 MMHG | HEIGHT: 69 IN | SYSTOLIC BLOOD PRESSURE: 137 MMHG | OXYGEN SATURATION: 95 % | TEMPERATURE: 97.8 F | BODY MASS INDEX: 27.99 KG/M2 | WEIGHT: 189 LBS | HEART RATE: 53 BPM | RESPIRATION RATE: 16 BRPM

## 2025-05-08 DIAGNOSIS — M54.12 CERVICAL RADICULOPATHY: Primary | ICD-10-CM

## 2025-05-08 DIAGNOSIS — M62.838 CERVICAL PARASPINOUS MUSCLE SPASM: ICD-10-CM

## 2025-05-08 DIAGNOSIS — I25.10 CORONARY ARTERY DISEASE DUE TO LIPID RICH PLAQUE: Primary | ICD-10-CM

## 2025-05-08 DIAGNOSIS — I25.83 CORONARY ARTERY DISEASE DUE TO LIPID RICH PLAQUE: Primary | ICD-10-CM

## 2025-05-08 LAB
ALBUMIN SERPL BCG-MCNC: 3.8 G/DL (ref 3.4–4.9)
ALP SERPL-CCNC: 91 U/L (ref 40–129)
ALT SERPL W/O P-5'-P-CCNC: 25 U/L (ref 10–50)
ANION GAP SERPL CALC-SCNC: 8 MEQ/L (ref 8–16)
AST SERPL-CCNC: 23 U/L (ref 10–50)
BASOPHILS ABSOLUTE: 0 THOU/MM3 (ref 0–0.1)
BASOPHILS NFR BLD AUTO: 0.4 %
BILIRUB SERPL-MCNC: 0.3 MG/DL (ref 0.3–1.2)
BUN SERPL-MCNC: 19 MG/DL (ref 8–23)
CALCIUM SERPL-MCNC: 9.6 MG/DL (ref 8.8–10.2)
CHLORIDE SERPL-SCNC: 105 MEQ/L (ref 98–111)
CO2 SERPL-SCNC: 27 MEQ/L (ref 22–29)
CREAT SERPL-MCNC: 1.4 MG/DL (ref 0.7–1.2)
DEPRECATED RDW RBC AUTO: 53.1 FL (ref 35–45)
EKG ATRIAL RATE: 57 BPM
EKG P AXIS: 62 DEGREES
EKG P-R INTERVAL: 162 MS
EKG Q-T INTERVAL: 400 MS
EKG QRS DURATION: 94 MS
EKG QTC CALCULATION (BAZETT): 389 MS
EKG R AXIS: 76 DEGREES
EKG T AXIS: 67 DEGREES
EKG VENTRICULAR RATE: 57 BPM
EOSINOPHIL NFR BLD AUTO: 0.9 %
EOSINOPHILS ABSOLUTE: 0.1 THOU/MM3 (ref 0–0.4)
ERYTHROCYTE [DISTWIDTH] IN BLOOD BY AUTOMATED COUNT: 14.5 % (ref 11.5–14.5)
GFR SERPL CREATININE-BSD FRML MDRD: 55 ML/MIN/1.73M2
GLUCOSE SERPL-MCNC: 104 MG/DL (ref 74–109)
HCT VFR BLD AUTO: 38.3 % (ref 42–52)
HGB BLD-MCNC: 12.7 GM/DL (ref 14–18)
IMM GRANULOCYTES # BLD AUTO: 0.03 THOU/MM3 (ref 0–0.07)
IMM GRANULOCYTES NFR BLD AUTO: 0.4 %
LYMPHOCYTES ABSOLUTE: 1 THOU/MM3 (ref 1–4.8)
LYMPHOCYTES NFR BLD AUTO: 11.1 %
MAGNESIUM SERPL-MCNC: 2 MG/DL (ref 1.6–2.6)
MCH RBC QN AUTO: 33.1 PG (ref 26–33)
MCHC RBC AUTO-ENTMCNC: 33.2 GM/DL (ref 32.2–35.5)
MCV RBC AUTO: 99.7 FL (ref 80–94)
MONOCYTES ABSOLUTE: 0.7 THOU/MM3 (ref 0.4–1.3)
MONOCYTES NFR BLD AUTO: 8.2 %
NEUTROPHILS ABSOLUTE: 6.8 THOU/MM3 (ref 1.8–7.7)
NEUTROPHILS NFR BLD AUTO: 79 %
NRBC BLD AUTO-RTO: 0 /100 WBC
OSMOLALITY SERPL CALC.SUM OF ELEC: 282 MOSMOL/KG (ref 275–300)
PLATELET # BLD AUTO: 187 THOU/MM3 (ref 130–400)
PMV BLD AUTO: 10.6 FL (ref 9.4–12.4)
POTASSIUM SERPL-SCNC: 4.2 MEQ/L (ref 3.5–5.2)
PROT SERPL-MCNC: 6.4 G/DL (ref 6.4–8.3)
RBC # BLD AUTO: 3.84 MILL/MM3 (ref 4.7–6.1)
SODIUM SERPL-SCNC: 140 MEQ/L (ref 135–145)
TROPONIN, HIGH SENSITIVITY: 9 NG/L (ref 0–12)
WBC # BLD AUTO: 8.6 THOU/MM3 (ref 4.8–10.8)

## 2025-05-08 PROCEDURE — 70498 CT ANGIOGRAPHY NECK: CPT

## 2025-05-08 PROCEDURE — 70450 CT HEAD/BRAIN W/O DYE: CPT

## 2025-05-08 PROCEDURE — 99285 EMERGENCY DEPT VISIT HI MDM: CPT

## 2025-05-08 PROCEDURE — 6370000000 HC RX 637 (ALT 250 FOR IP): Performed by: EMERGENCY MEDICINE

## 2025-05-08 PROCEDURE — 6360000004 HC RX CONTRAST MEDICATION

## 2025-05-08 PROCEDURE — 1123F ACP DISCUSS/DSCN MKR DOCD: CPT | Performed by: INTERNAL MEDICINE

## 2025-05-08 PROCEDURE — 80053 COMPREHEN METABOLIC PANEL: CPT

## 2025-05-08 PROCEDURE — 1036F TOBACCO NON-USER: CPT | Performed by: INTERNAL MEDICINE

## 2025-05-08 PROCEDURE — 85025 COMPLETE CBC W/AUTO DIFF WBC: CPT

## 2025-05-08 PROCEDURE — 76376 3D RENDER W/INTRP POSTPROCES: CPT

## 2025-05-08 PROCEDURE — 36415 COLL VENOUS BLD VENIPUNCTURE: CPT

## 2025-05-08 PROCEDURE — 70496 CT ANGIOGRAPHY HEAD: CPT

## 2025-05-08 PROCEDURE — 3017F COLORECTAL CA SCREEN DOC REV: CPT | Performed by: INTERNAL MEDICINE

## 2025-05-08 PROCEDURE — 99214 OFFICE O/P EST MOD 30 MIN: CPT | Performed by: INTERNAL MEDICINE

## 2025-05-08 PROCEDURE — 1159F MED LIST DOCD IN RCRD: CPT | Performed by: INTERNAL MEDICINE

## 2025-05-08 PROCEDURE — 93005 ELECTROCARDIOGRAM TRACING: CPT

## 2025-05-08 PROCEDURE — 6370000000 HC RX 637 (ALT 250 FOR IP)

## 2025-05-08 PROCEDURE — 84484 ASSAY OF TROPONIN QUANT: CPT

## 2025-05-08 PROCEDURE — 93000 ELECTROCARDIOGRAM COMPLETE: CPT | Performed by: INTERNAL MEDICINE

## 2025-05-08 PROCEDURE — G8427 DOCREV CUR MEDS BY ELIG CLIN: HCPCS | Performed by: INTERNAL MEDICINE

## 2025-05-08 PROCEDURE — 93010 ELECTROCARDIOGRAM REPORT: CPT | Performed by: INTERNAL MEDICINE

## 2025-05-08 PROCEDURE — G8417 CALC BMI ABV UP PARAM F/U: HCPCS | Performed by: INTERNAL MEDICINE

## 2025-05-08 PROCEDURE — 83735 ASSAY OF MAGNESIUM: CPT

## 2025-05-08 RX ORDER — IOPAMIDOL 755 MG/ML
80 INJECTION, SOLUTION INTRAVASCULAR
Status: COMPLETED | OUTPATIENT
Start: 2025-05-08 | End: 2025-05-08

## 2025-05-08 RX ORDER — DOCUSATE SODIUM 100 MG/1
100 CAPSULE, LIQUID FILLED ORAL 2 TIMES DAILY
Qty: 14 CAPSULE | Refills: 0 | Status: SHIPPED | OUTPATIENT
Start: 2025-05-08

## 2025-05-08 RX ORDER — CLOPIDOGREL BISULFATE 75 MG/1
75 TABLET ORAL DAILY
Qty: 90 TABLET | Refills: 3 | Status: CANCELLED | OUTPATIENT
Start: 2025-05-08

## 2025-05-08 RX ORDER — HYDROCODONE BITARTRATE AND ACETAMINOPHEN 7.5; 325 MG/1; MG/1
1 TABLET ORAL ONCE
Refills: 0 | Status: COMPLETED | OUTPATIENT
Start: 2025-05-08 | End: 2025-05-08

## 2025-05-08 RX ORDER — HYDROCODONE BITARTRATE AND ACETAMINOPHEN 5; 325 MG/1; MG/1
1 TABLET ORAL EVERY 6 HOURS PRN
Qty: 12 TABLET | Refills: 0 | Status: SHIPPED | OUTPATIENT
Start: 2025-05-08 | End: 2025-05-11

## 2025-05-08 RX ORDER — DIAZEPAM 2 MG/1
2 TABLET ORAL EVERY 8 HOURS PRN
Qty: 9 TABLET | Refills: 0 | Status: SHIPPED | OUTPATIENT
Start: 2025-05-08 | End: 2025-05-18

## 2025-05-08 RX ORDER — ATORVASTATIN CALCIUM 80 MG/1
80 TABLET, FILM COATED ORAL NIGHTLY
Qty: 90 TABLET | Refills: 3 | Status: SHIPPED | OUTPATIENT
Start: 2025-05-08

## 2025-05-08 RX ORDER — LISINOPRIL 5 MG/1
TABLET ORAL
Qty: 90 TABLET | Refills: 3 | Status: SHIPPED | OUTPATIENT
Start: 2025-05-08

## 2025-05-08 RX ORDER — LIDOCAINE 4 G/G
1 PATCH TOPICAL DAILY
Status: DISCONTINUED | OUTPATIENT
Start: 2025-05-08 | End: 2025-05-08 | Stop reason: HOSPADM

## 2025-05-08 RX ORDER — DIAZEPAM 2 MG/1
2 TABLET ORAL ONCE
Status: COMPLETED | OUTPATIENT
Start: 2025-05-08 | End: 2025-05-08

## 2025-05-08 RX ADMIN — DIAZEPAM 2 MG: 2 TABLET ORAL at 18:30

## 2025-05-08 RX ADMIN — HYDROCODONE BITARTRATE AND ACETAMINOPHEN 1 TABLET: 7.5; 325 TABLET ORAL at 18:15

## 2025-05-08 RX ADMIN — IOPAMIDOL 80 ML: 755 INJECTION, SOLUTION INTRAVENOUS at 19:39

## 2025-05-08 ASSESSMENT — PAIN DESCRIPTION - LOCATION: LOCATION: NECK

## 2025-05-08 ASSESSMENT — PAIN - FUNCTIONAL ASSESSMENT
PAIN_FUNCTIONAL_ASSESSMENT: 0-10
PAIN_FUNCTIONAL_ASSESSMENT: 0-10

## 2025-05-08 ASSESSMENT — PAIN SCALES - GENERAL
PAINLEVEL_OUTOF10: 8
PAINLEVEL_OUTOF10: 8

## 2025-05-08 NOTE — ED NOTES
Pt returned to floor from CT. Pt resting on cot w/ eyes open upon entrance. Respirations even and unlabored. Family present at bedside.

## 2025-05-08 NOTE — ED TRIAGE NOTES
Pt presents to the ED with complaints of neck pain. Pt reports neck pain began last night and took a flexeril last night and states it did not really help. Pt states when he woke up this morning pain was tolerable but worse throughout the day. Pt reports his neck is stiff. Pt states pain is worse on the R side. Pt seen cardiologist today and had EKG completed. Pt states he took a flexeril this morning. Pt denies any new injury.

## 2025-05-08 NOTE — ED PROVIDER NOTES
Attending Supervising Physician's Attestation Statement  I performed a history and physical examination on the patient and discussed the management with the resident physician at 1810. I reviewed and agree with the findings and plan as documented in the resident physician note. This includes all diagnostic interpretations and treatment plans as written. I was present for the key portion of any procedures performed and the inclusive time noted in any critical care statement. Except as noted below.     Brief H&P   This patient is a 69 y.o. Male with neck pain.  Patient states symptoms began several days ago on the left side of his neck, and radiated to his shoulder.  Over the last 24 hours, the pain has included his right neck and the back of his head.  He denies any fevers or chills, no history of trauma, no new pillows, or mattress.  Patient does have a history of CAD, he is on Plavix.  He states he did have similar symptoms about 3 or 4 months ago, but these resolved on their own.  He saw cardiology today, he did mention his symptoms, but they were not concerned about a cardiac etiology.  He denies any numbness or tingling, or any weakness anywhere.    On my examination, nontoxic-appearing 69-year-old male, he does appear to be in pain.    HEENT: Normocephalic, Atraumatic. Neck neck is painful to palpation, particularly the paracervical muscles, and the occipital ridge.  Palpation and movement reproduces the pain.  Lungs: Clear and equal bilaterally. No increased work of breathing or respiratory distress.  Heart: Rate and rhythm regular, no murmurs clicks or gallops  Abdomen: Soft non-tender, and non-distended abdomen. No rigidity. No Guarding.   Lower extremities: No edema, no tenderness to palpation.   Neuro: Awake and alert, no lateralizing deficits, cranial nerves II through XII grossly intact bilaterally    Diagnostics and Treatments      LABS / RADIOLOGY:     Labs Reviewed   CBC WITH AUTO DIFFERENTIAL - 
Max Daily Amount: 6 mg    DOCUSATE SODIUM (COLACE) 100 MG CAPSULE    Take 1 capsule by mouth 2 times daily While taking Norco for pain to prevent constipation    HYDROCODONE-ACETAMINOPHEN (NORCO) 5-325 MG PER TABLET    Take 1 tablet by mouth every 6 hours as needed for Pain for up to 3 days. Max Daily Amount: 4 tablets         FINAL DISPOSITION   Shared Decision-Making was performed, disposition discussed with the patient/family and questions answered.      Outpatient follow up (If applicable):  [facility] [place] [address]  [place] [address]  [phone]  Go in 1 day  During walk-in hours, Monday through Friday, between 8 AM and 1 PM.    [facility]  [address] [facility] [address]  [phone]  Go to   As needed, If symptoms worsen    The results of pertinent diagnostic studies and exam findings were discussed with the patient/surrogate. The patient’s provisional diagnosis and plan of care were discussed with the patient and present family who expressed understanding. Medications were reviewed and indications and risks of medications were discussed with the patient/family present. Strict return precautions and follow up instructions were discussed with the patient prior to discharge, with which the patient agrees.              FINAL DIAGNOSES:  Final diagnoses:   Cervical radiculopathy       Condition: condition: stable  Dispo: Discharge to home  DISPOSITION Decision To Discharge 05/08/2025 08:35:21 PM   DISPOSITION CONDITION Stable           This transcription was electronically signed. It was dictated by use of voice recognition software and electronically transcribed. The transcription may contain errors not detected in proofreading.    Electronically signed by Farooq Cazares MD on 5/8/25 at 6:34 PM EDT

## 2025-05-08 NOTE — PROGRESS NOTES
1 year follow up  EKG completed today in office  Denies chest pain, SOB, palpitations, or swelling in BLE.      
bradycardia    Assessment & Plan  CAD s/p PCI, STEMI 10/2020  Continue Aspirin, statin, zetia  Will d/c plavix now  EF 55-60%  EKG benign    Smoking  Discussed to quit  Gave patches on last visit       Advised patient to quit and offered support.  Asked patient to set a quit date and inform me when they have done so.  Discussed prior reason for relapse and strategies to overcome this in the future.  Recommended nicotine replacement with patches, reviewed use and dosing.  Recommended nicotine gum, reviewed use and dosing.  Spent more than 10 minutes counseling patient on smoking cessation, discussing strategies and resources to support the patient in quitting.    Advised patient to call office or seek immediate medical attention if there is any new onset of  any chest pain, sob, palpitations, lightheadedness, dizziness, orthopnea, PND or pedal edema.   All medication side effects were discussed in details.    Thank youfor allowing me to participate in the care of this patient.   Please do not hesitate to contact me for any further questions.     Return in about 1 year (around 5/8/2026), or if symptoms worsen or fail to improve, for Review testing, Regular follow up.       The patient (or guardian, if applicable) and other individuals in attendance with the patient were advised that Artificial Intelligence will be utilized during this visit to record, process the conversation to generate a clinical note, and support improvement of the AI technology. The patient (or guardian, if applicable) and other individuals in attendance at the appointment consented to the use of AI, including the recording.      Electronically signed by Félix De Jesus MD formerly [facility]  5/8/2025 at 11:47 AM EDT

## 2025-05-09 LAB
EKG Q-T INTERVAL: 404 MS
EKG QRS DURATION: 88 MS
EKG QTC CALCULATION (BAZETT): 406 MS
EKG R AXIS: 63 DEGREES
EKG T AXIS: 55 DEGREES
EKG VENTRICULAR RATE: 61 BPM

## 2025-05-09 PROCEDURE — 93010 ELECTROCARDIOGRAM REPORT: CPT | Performed by: INTERNAL MEDICINE

## 2025-05-09 NOTE — DISCHARGE INSTR - COC
Continuity of Care Form    Patient Name: Milton Davis   :  1955  MRN:  [id number]    Admit date:  2025  Discharge date:  ***    Code Status Order: Prior   Advance Directives:     Admitting Physician:  No admitting provider for patient encounter.  PCP: Heri Sevilla MD    Discharging Nurse: ***  Discharging Hospital Unit/Room#: 12/012A  Discharging Unit Phone Number: ***    Emergency Contact:   Extended Emergency Contact Information  Primary Emergency Contact: Charity Davis [birth year]  Address: [address] [place] [id number] [facility]  Home Phone: [phone]  Mobile Phone: [phone]  Relation: Spouse  Secondary Emergency Contact: Carolyn Rodriguez   [facility]  Home Phone: [phone]  Mobile Phone: [phone]  Relation: Child    Past Surgical History:  Past Surgical History:   Procedure Laterality Date    BACK SURGERY      BAND HEMORRHOIDECTOMY      CARPAL TUNNEL RELEASE Left 03/10/2024    CARPAL TUNNEL RELEASE Left 2024    DIAGNOSTIC CARDIAC CATH LAB PROCEDURE  10/23/2020    2 stents     EYE SURGERY Bilateral        Immunization History:   Immunization History   Administered Date(s) Administered    COVID-19, MODERNA BLUE border, Primary or Immunocompromised, (age 12y+), IM, 100 mcg/0.5mL 2021, 2021, 2021    COVID-19, MODERNA Bivalent, (age 12y+), IM, 50 mcg/0.5 mL 10/10/2022, 2023    COVID-19, MODERNA, , (age 12y+), IM, 50mcg/0.5mL 10/18/2023    Influenza A (A6T8-82) Vaccine PF IM 2010    TDaP, ADACEL (age 10y-64y), BOOSTRIX (age 10y+), IM, 0.5mL 2024       Active Problems:  Patient Active Problem List   Diagnosis Code    Marijuana dependence (HCC) F12.20    STEMI (ST elevation myocardial infarction) (HCC) I21.3    S/P cardiac cath Z98.890    S/P percutaneous transluminal angioplasty (PTA) with stent placement Z95.820    Hyperlipidemia LDL goal <70 E78.5       Isolation/Infection:   Isolation

## 2025-05-09 NOTE — DISCHARGE INSTRUCTIONS
Your evaluated emerged department today for neck spasms.  You are found to have cervical stenosis at the C6 region on CT imaging of your neck.  This is not dangerous, however it can create muscle spasms.  You will be given 3 new prescriptions upon discharge from the ED today.  These include Norco, Valium, and Colace, which helps prevent constipation related to the Norco.  When taken these medications, you cannot operate heavy machinery, including driving.  Please follow-up with [facility] during the walk-in hours as stated above for management related to your symptoms here in the ED.  Please come back to the ED should you not be able to control your pain at home, or should you develop any neurologic symptoms, such as numbness/tingling.

## 2025-05-14 ENCOUNTER — TRANSCRIBE ORDERS (OUTPATIENT)
Dept: ADMINISTRATIVE | Age: 70
End: 2025-05-14

## 2025-05-14 DIAGNOSIS — M54.2 CERVICALGIA: ICD-10-CM

## 2025-05-14 DIAGNOSIS — M50.322 OTHER CERVICAL DISC DEGENERATION AT C5-C6 LEVEL: ICD-10-CM

## 2025-05-14 DIAGNOSIS — M54.12 CERVICAL RADICULOPATHY: Primary | ICD-10-CM

## 2025-05-14 DIAGNOSIS — M50.323 OTHER CERVICAL DISC DEGENERATION AT C6-C7 LEVEL: ICD-10-CM

## 2025-06-06 ENCOUNTER — HOSPITAL ENCOUNTER (OUTPATIENT)
Dept: MRI IMAGING | Age: 70
Discharge: HOME | End: 2025-06-06
Payer: MEDICARE

## 2025-06-06 DIAGNOSIS — M54.12 CERVICAL RADICULOPATHY: ICD-10-CM

## 2025-06-06 DIAGNOSIS — M54.2 CERVICALGIA: ICD-10-CM

## 2025-06-06 DIAGNOSIS — M50.322 OTHER CERVICAL DISC DEGENERATION AT C5-C6 LEVEL: ICD-10-CM

## 2025-06-06 DIAGNOSIS — M50.323 OTHER CERVICAL DISC DEGENERATION AT C6-C7 LEVEL: ICD-10-CM

## 2025-06-06 PROCEDURE — 72141 MRI NECK SPINE W/O DYE: CPT

## 2025-06-09 ENCOUNTER — HOSPITAL ENCOUNTER (OUTPATIENT)
Dept: AUDIOLOGY | Age: 70
Discharge: HOME | End: 2025-06-09

## 2025-06-09 PROCEDURE — 92593 HC HEARING AID CHECK, BOTH EARS: CPT | Performed by: AUDIOLOGIST

## 2025-06-09 NOTE — PROGRESS NOTES
ACCOUNT #: [id number]      DIAGNOSIS: H90.3    ANNUAL HEARING AID CHECK: (Phonak P70-R x2 , warranty  2023) Patient doing well with his hearing aids. He is noticing decreased battery life- ~12 hours per day. Has not noticed any change in hearing. Last audiogram completed 2020. Otoscopy revealed clear canal with normal appearing tympanic membrane- bilaterally. Both hearing aids cleaned- occluded cerudisk filter removed from each hearing aid. New filter, retention wire and large open dome installed after receivers were vacuumed. Debris brushed and vacuumed from microphones on both hearing aids. No issues noted with listening check of the hearing aids. Verified firmware version is current in both hearing aids. Patient did not want any programming adjustments. Recommended manufacture repair to address battery issues. Also recommended updated audiogram with hearing reprogramming/fitting verification. Patient stated he intends to send his hearing aids in before they are 5 years old 10/14/2020. He will contact the department when he is ready to proceed with a repair and hearing aid check. He will schedule his next annual hearing aid check after his hearing aids are repaired. He has adequate hearing aid supplies. Patient paid $25 for hearing aid check.

## 2025-06-11 ENCOUNTER — HOSPITAL ENCOUNTER (OUTPATIENT)
Dept: PHYSICAL THERAPY | Age: 70
Setting detail: THERAPIES SERIES
Discharge: HOME | End: 2025-06-11
Payer: MEDICARE

## 2025-06-11 PROCEDURE — 97140 MANUAL THERAPY 1/> REGIONS: CPT

## 2025-06-11 PROCEDURE — 97161 PT EVAL LOW COMPLEX 20 MIN: CPT

## 2025-06-11 NOTE — THERAPY EVALUATION
[facility]  PHYSICAL THERAPY  [x] EVALUATION  [] DAILY NOTE (LAND) [] DAILY NOTE (AQUATIC ) [] PROGRESS NOTE [] DISCHARGE NOTE    [] [facility]   [x] [facility]    [] [facility]   [] [facility]    Date: 2025  Patient Name:  Milton Davis  : 1955  MRN: [id number]  CSN: [id number]    Referring Practitioner Yosef Wheeler PA-C [id number]      Diagnosis  Diagnoses       M54.12 (ICD-10-CM) - Radiculopathy, cervical region    M50.322 (ICD-10-CM) - Other cervical disc degeneration at C5-C6 level    M50.323 (ICD-10-CM) - Other cervical disc degeneration at C6-C7 level    M54.2 (ICD-10-CM) - Cervicalgia           Treatment Diagnosis M54.2  Neck Pain  R29.3 Abnormal Posture  R53.1 Weakness   Date of Evaluation 25   Additional Pertinent History Milton Davis has a past medical history of Hyperlipidemia and Hypertension.  he has a past surgical history that includes back surgery; Diagnostic Cardiac Cath Lab Procedure (10/23/2020); Band hemorrhoidectomy (); Carpal tunnel release (Left, 03/10/2024); Carpal tunnel release (Left, 2024); and eye surgery (Bilateral, ).     Allergies No Known Allergies   Medications   Current Outpatient Medications:     atorvastatin (LIPITOR) 80 MG tablet, Take 1 tablet by mouth nightly, Disp: 90 tablet, Rfl: 3    lisinopril (PRINIVIL;ZESTRIL) 5 MG tablet, take 1 tablet by mouth once daily, Disp: 90 tablet, Rfl: 3    docusate sodium (COLACE) 100 MG capsule, Take 1 capsule by mouth 2 times daily While taking Norco for pain to prevent constipation, Disp: 14 capsule, Rfl: 0    Nemolizumab-ilto (NEMLUVIO SC), Inject into the skin every 30 days, Disp: , Rfl:     tamsulosin (FLOMAX) 0.4 MG capsule, Take 1 capsule by mouth daily, Disp: 90 capsule, Rfl: 3    pantoprazole (PROTONIX) 40 MG tablet, Take 1 tablet by mouth 2 times daily, Disp: , Rfl:     aspirin (RA ASPIRIN EC

## 2025-06-16 ENCOUNTER — HOSPITAL ENCOUNTER (OUTPATIENT)
Dept: PHYSICAL THERAPY | Age: 70
Setting detail: THERAPIES SERIES
Discharge: HOME | End: 2025-06-16
Payer: MEDICARE

## 2025-06-16 PROCEDURE — 97140 MANUAL THERAPY 1/> REGIONS: CPT

## 2025-06-16 PROCEDURE — 97110 THERAPEUTIC EXERCISES: CPT

## 2025-06-16 NOTE — PROGRESS NOTES
reports no pain currently but does have stiffness, no headaches currently or over the weekend.  States feeling good after last session      Objective:    TREATMENT   Precautions:    Pain: 3/10 stiffness R>L    \"X” in shaded column indicates activity completed today    “*\" next to exercise/intervention indicates progression   Modalities Parameters/  Location  Notes                     Manual Therapy Time/Technique  Notes   Cervical traction, suboccipital release, MFR to upper trap and levator 10 min x Felt good               Exercise/Intervention   Notes   Neck rotation 5 x 5s  x    Neck lateral flexion 5 x 5s  x    Neck flexion 5 x 5s  x    Cervical retraction 10x H3s  x    Scapular retraction 10 x3s  x    Shoulder shrugs * 10x  x    Shoulder rolls retro* 10x  x           Supine cervical retraction* 10 x 3s  x                           Specific Interventions Next Treatment: neck stretches/ROM, modalities/manual to decrease tightness, posture strengthening    Activity/Treatment Tolerance:  [x]  Patient tolerated treatment well  []  Patient limited by fatigue  []  Patient limited by pain   []  Patient limited by medical complications  []  Other:     Assessment: Pt tolerated session well. Reporting decreased stiffness to 1-2/10 at end of treatment.  Tightness noted with MFR Uts, R UT more than LUT noted.  Tightness noted with Suboccipital release.  Pt to cont with HEP and monitor response to session.    GOALS:  Patient Goal: Decrease tension, avoid surgery and injections    Short Term Goals: defer to LTGs    Long Term Goals: 6 weeks  Patient will improve AROM of cervical rotation left from 32 to 42 degrees for ease driving.   Patient will improve cervical lateral flexion to right from 12 to 20 degrees to decrease upper trap tightness to reduce pain.   Patient will report good postural awareness at home to carry over to housework.  Patient will improve latissimus dorsi strength from 4-/5 to 4/5 and horizontal abduction

## 2025-06-18 ENCOUNTER — HOSPITAL ENCOUNTER (OUTPATIENT)
Dept: PHYSICAL THERAPY | Age: 70
Setting detail: THERAPIES SERIES
Discharge: HOME | End: 2025-06-18
Payer: MEDICARE

## 2025-06-18 PROCEDURE — 97140 MANUAL THERAPY 1/> REGIONS: CPT

## 2025-06-18 PROCEDURE — 97110 THERAPEUTIC EXERCISES: CPT

## 2025-06-18 NOTE — PROGRESS NOTES
[facility]  PHYSICAL THERAPY  [] EVALUATION  [x] DAILY NOTE (LAND) [] DAILY NOTE (AQUATIC ) [] PROGRESS NOTE [] DISCHARGE NOTE    [] [facility] [place]   [x] [facility]    [] [facility]   [] [facility]    Date: 2025  Patient Name:  Milton Davis  : 1955  MRN: [id number]  CSN: [id number]    Referring Practitioner Yosef Wheeler PA-C [id number]      Diagnosis  Diagnoses       M54.12 (ICD-10-CM) - Radiculopathy, cervical region    M50.322 (ICD-10-CM) - Other cervical disc degeneration at C5-C6 level    M50.323 (ICD-10-CM) - Other cervical disc degeneration at C6-C7 level    M54.2 (ICD-10-CM) - Cervicalgia           Treatment Diagnosis M54.2  Neck Pain  R29.3 Abnormal Posture  R53.1 Weakness   Date of Evaluation 25   Additional Pertinent History Milton Davis has a past medical history of Hyperlipidemia and Hypertension.  he has a past surgical history that includes back surgery; Diagnostic Cardiac Cath Lab Procedure (10/23/2020); Band hemorrhoidectomy (); Carpal tunnel release (Left, 03/10/2024); Carpal tunnel release (Left, 2024); and eye surgery (Bilateral, ).     Allergies No Known Allergies   Medications   Current Outpatient Medications:     atorvastatin (LIPITOR) 80 MG tablet, Take 1 tablet by mouth nightly, Disp: 90 tablet, Rfl: 3    lisinopril (PRINIVIL;ZESTRIL) 5 MG tablet, take 1 tablet by mouth once daily, Disp: 90 tablet, Rfl: 3    docusate sodium (COLACE) 100 MG capsule, Take 1 capsule by mouth 2 times daily While taking Norco for pain to prevent constipation, Disp: 14 capsule, Rfl: 0    Nemolizumab-ilto (NEMLUVIO SC), Inject into the skin every 30 days, Disp: , Rfl:     tamsulosin (FLOMAX) 0.4 MG capsule, Take 1 capsule by mouth daily, Disp: 90 capsule, Rfl: 3    pantoprazole (PROTONIX) 40 MG tablet, Take 1 tablet by mouth 2 times daily, Disp: , Rfl:     aspirin (RA ASPIRIN EC

## 2025-06-24 ENCOUNTER — HOSPITAL ENCOUNTER (OUTPATIENT)
Dept: PHYSICAL THERAPY | Age: 70
Setting detail: THERAPIES SERIES
Discharge: HOME | End: 2025-06-24
Payer: MEDICARE

## 2025-06-24 PROCEDURE — 97140 MANUAL THERAPY 1/> REGIONS: CPT

## 2025-06-24 PROCEDURE — 97110 THERAPEUTIC EXERCISES: CPT

## 2025-06-24 NOTE — PROGRESS NOTES
[facility]  PHYSICAL THERAPY  [] EVALUATION  [x] DAILY NOTE (LAND) [] DAILY NOTE (AQUATIC ) [] PROGRESS NOTE [] DISCHARGE NOTE    [] [facility] [place]   [x] [place] [facility]    [] [facility]   [] [facility]    Date: 2025  Patient Name:  Milton Davis  : 1955  MRN: [id number]  CSN: [id number]    Referring Practitioner Yosef Wheeler PA-C [id number]      Diagnosis  Diagnoses       M54.12 (ICD-10-CM) - Radiculopathy, cervical region    M50.322 (ICD-10-CM) - Other cervical disc degeneration at C5-C6 level    M50.323 (ICD-10-CM) - Other cervical disc degeneration at C6-C7 level    M54.2 (ICD-10-CM) - Cervicalgia           Treatment Diagnosis M54.2  Neck Pain  R29.3 Abnormal Posture  R53.1 Weakness   Date of Evaluation 25   Additional Pertinent History Milton Davis has a past medical history of Hyperlipidemia and Hypertension.  he has a past surgical history that includes back surgery; Diagnostic Cardiac Cath Lab Procedure (10/23/2020); Band hemorrhoidectomy (); Carpal tunnel release (Left, 03/10/2024); Carpal tunnel release (Left, 2024); and eye surgery (Bilateral, ).     Allergies No Known Allergies   Medications   Current Outpatient Medications:     atorvastatin (LIPITOR) 80 MG tablet, Take 1 tablet by mouth nightly, Disp: 90 tablet, Rfl: 3    lisinopril (PRINIVIL;ZESTRIL) 5 MG tablet, take 1 tablet by mouth once daily, Disp: 90 tablet, Rfl: 3    docusate sodium (COLACE) 100 MG capsule, Take 1 capsule by mouth 2 times daily While taking Norco for pain to prevent constipation, Disp: 14 capsule, Rfl: 0    Nemolizumab-ilto (NEMLUVIO SC), Inject into the skin every 30 days, Disp: , Rfl:     tamsulosin (FLOMAX) 0.4 MG capsule, Take 1 capsule by mouth daily, Disp: 90 capsule, Rfl: 3    pantoprazole (PROTONIX) 40 MG tablet, Take 1 tablet by mouth 2 times daily, Disp: , Rfl:     aspirin (RA ASPIRIN EC

## 2025-06-26 ENCOUNTER — HOSPITAL ENCOUNTER (OUTPATIENT)
Dept: PHYSICAL THERAPY | Age: 70
Setting detail: THERAPIES SERIES
Discharge: HOME | End: 2025-06-26
Payer: MEDICARE

## 2025-06-26 PROCEDURE — 97110 THERAPEUTIC EXERCISES: CPT

## 2025-06-26 PROCEDURE — 97140 MANUAL THERAPY 1/> REGIONS: CPT

## 2025-06-26 NOTE — PROGRESS NOTES
[facility]  PHYSICAL THERAPY  [] EVALUATION  [x] DAILY NOTE (LAND) [] DAILY NOTE (AQUATIC ) [] PROGRESS NOTE [] DISCHARGE NOTE    [] [facility] [place]   [x] [place] [facility]    [] [facility]   [] [facility]    Date: 2025  Patient Name:  Milton Davis  : 1955  MRN: [id number]  CSN: [id number]    Referring Practitioner Yosef Wheeler PA-C [id number]      Diagnosis  Diagnoses       M54.12 (ICD-10-CM) - Radiculopathy, cervical region    M50.322 (ICD-10-CM) - Other cervical disc degeneration at C5-C6 level    M50.323 (ICD-10-CM) - Other cervical disc degeneration at C6-C7 level    M54.2 (ICD-10-CM) - Cervicalgia           Treatment Diagnosis M54.2  Neck Pain  R29.3 Abnormal Posture  R53.1 Weakness   Date of Evaluation 25   Additional Pertinent History Milton Davis has a past medical history of Hyperlipidemia and Hypertension.  he has a past surgical history that includes back surgery; Diagnostic Cardiac Cath Lab Procedure (10/23/2020); Band hemorrhoidectomy (); Carpal tunnel release (Left, 03/10/2024); Carpal tunnel release (Left, 2024); and eye surgery (Bilateral, ).     Allergies No Known Allergies   Medications   Current Outpatient Medications:     atorvastatin (LIPITOR) 80 MG tablet, Take 1 tablet by mouth nightly, Disp: 90 tablet, Rfl: 3    lisinopril (PRINIVIL;ZESTRIL) 5 MG tablet, take 1 tablet by mouth once daily, Disp: 90 tablet, Rfl: 3    docusate sodium (COLACE) 100 MG capsule, Take 1 capsule by mouth 2 times daily While taking Norco for pain to prevent constipation, Disp: 14 capsule, Rfl: 0    Nemolizumab-ilto (NEMLUVIO SC), Inject into the skin every 30 days, Disp: , Rfl:     tamsulosin (FLOMAX) 0.4 MG capsule, Take 1 capsule by mouth daily, Disp: 90 capsule, Rfl: 3    pantoprazole (PROTONIX) 40 MG tablet, Take 1 tablet by mouth 2 times daily, Disp: , Rfl:     aspirin (RA ASPIRIN EC

## 2025-07-01 ENCOUNTER — TRANSCRIBE ORDERS (OUTPATIENT)
Dept: ADMINISTRATIVE | Age: 70
End: 2025-07-01

## 2025-07-01 ENCOUNTER — HOSPITAL ENCOUNTER (OUTPATIENT)
Dept: PHYSICAL THERAPY | Age: 70
Setting detail: THERAPIES SERIES
Discharge: HOME | End: 2025-07-01
Payer: MEDICARE

## 2025-07-01 DIAGNOSIS — Z87.891 HISTORY OF TOBACCO USE: Primary | ICD-10-CM

## 2025-07-01 PROCEDURE — 97110 THERAPEUTIC EXERCISES: CPT

## 2025-07-01 PROCEDURE — 97140 MANUAL THERAPY 1/> REGIONS: CPT

## 2025-07-01 NOTE — PROGRESS NOTES
[facility]  PHYSICAL THERAPY  [] EVALUATION  [x] DAILY NOTE (LAND) [] DAILY NOTE (AQUATIC ) [] PROGRESS NOTE [] DISCHARGE NOTE    [] [facility] [place]   [x] [place] [facility]    [] [facility]   [] [facility]    Date: 2025  Patient Name:  Milton Davis  : 1955  MRN: [id number]  CSN: [id number]    Referring Practitioner Yosef Wheeler PA-C [id number]      Diagnosis  Diagnoses       M54.12 (ICD-10-CM) - Radiculopathy, cervical region    M50.322 (ICD-10-CM) - Other cervical disc degeneration at C5-C6 level    M50.323 (ICD-10-CM) - Other cervical disc degeneration at C6-C7 level    M54.2 (ICD-10-CM) - Cervicalgia           Treatment Diagnosis M54.2  Neck Pain  R29.3 Abnormal Posture  R53.1 Weakness   Date of Evaluation 25   Additional Pertinent History Milton Davis has a past medical history of Hyperlipidemia and Hypertension.  he has a past surgical history that includes back surgery; Diagnostic Cardiac Cath Lab Procedure (10/23/2020); Band hemorrhoidectomy (); Carpal tunnel release (Left, 03/10/2024); Carpal tunnel release (Left, 2024); and eye surgery (Bilateral, ).     Allergies No Known Allergies   Medications   Current Outpatient Medications:     atorvastatin (LIPITOR) 80 MG tablet, Take 1 tablet by mouth nightly, Disp: 90 tablet, Rfl: 3    lisinopril (PRINIVIL;ZESTRIL) 5 MG tablet, take 1 tablet by mouth once daily, Disp: 90 tablet, Rfl: 3    docusate sodium (COLACE) 100 MG capsule, Take 1 capsule by mouth 2 times daily While taking Norco for pain to prevent constipation, Disp: 14 capsule, Rfl: 0    Nemolizumab-ilto (NEMLUVIO SC), Inject into the skin every 30 days, Disp: , Rfl:     tamsulosin (FLOMAX) 0.4 MG capsule, Take 1 capsule by mouth daily, Disp: 90 capsule, Rfl: 3    pantoprazole (PROTONIX) 40 MG tablet, Take 1 tablet by mouth 2 times daily, Disp: , Rfl:     aspirin (RA ASPIRIN EC ADULT

## 2025-07-03 ENCOUNTER — HOSPITAL ENCOUNTER (OUTPATIENT)
Dept: PHYSICAL THERAPY | Age: 70
Setting detail: THERAPIES SERIES
Discharge: HOME | End: 2025-07-03
Payer: MEDICARE

## 2025-07-03 PROCEDURE — 97140 MANUAL THERAPY 1/> REGIONS: CPT

## 2025-07-03 PROCEDURE — 97110 THERAPEUTIC EXERCISES: CPT

## 2025-07-03 NOTE — PROGRESS NOTES
[facility]  PHYSICAL THERAPY  [] EVALUATION  [x] DAILY NOTE (LAND) [] DAILY NOTE (AQUATIC ) [] PROGRESS NOTE [] DISCHARGE NOTE    [] [facility] [place]   [x] [place] [facility]    [] [facility]   [] [facility]    Date: 7/3/2025  Patient Name:  Milton Davis  : 1955  MRN: [id number]  CSN: [id number]    Referring Practitioner Yosef Wheeler PA-C [id number]      Diagnosis  Diagnoses       M54.12 (ICD-10-CM) - Radiculopathy, cervical region    M50.322 (ICD-10-CM) - Other cervical disc degeneration at C5-C6 level    M50.323 (ICD-10-CM) - Other cervical disc degeneration at C6-C7 level    M54.2 (ICD-10-CM) - Cervicalgia           Treatment Diagnosis M54.2  Neck Pain  R29.3 Abnormal Posture  R53.1 Weakness   Date of Evaluation 25   Additional Pertinent History Miltno Davis has a past medical history of Hyperlipidemia and Hypertension.  he has a past surgical history that includes back surgery; Diagnostic Cardiac Cath Lab Procedure (10/23/2020); Band hemorrhoidectomy (); Carpal tunnel release (Left, 03/10/2024); Carpal tunnel release (Left, 2024); and eye surgery (Bilateral, ).     Allergies No Known Allergies   Medications   Current Outpatient Medications:     atorvastatin (LIPITOR) 80 MG tablet, Take 1 tablet by mouth nightly, Disp: 90 tablet, Rfl: 3    lisinopril (PRINIVIL;ZESTRIL) 5 MG tablet, take 1 tablet by mouth once daily, Disp: 90 tablet, Rfl: 3    docusate sodium (COLACE) 100 MG capsule, Take 1 capsule by mouth 2 times daily While taking Norco for pain to prevent constipation, Disp: 14 capsule, Rfl: 0    Nemolizumab-ilto (NEMLUVIO SC), Inject into the skin every 30 days, Disp: , Rfl:     tamsulosin (FLOMAX) 0.4 MG capsule, Take 1 capsule by mouth daily, Disp: 90 capsule, Rfl: 3    pantoprazole (PROTONIX) 40 MG tablet, Take 1 tablet by mouth 2 times daily, Disp: , Rfl:     aspirin (RA ASPIRIN EC ADULT

## 2025-07-07 ENCOUNTER — HOSPITAL ENCOUNTER (OUTPATIENT)
Dept: CT IMAGING | Age: 70
Discharge: HOME | End: 2025-07-07
Attending: FAMILY MEDICINE
Payer: MEDICARE

## 2025-07-07 DIAGNOSIS — Z87.891 HISTORY OF TOBACCO USE: ICD-10-CM

## 2025-07-07 PROCEDURE — 71271 CT THORAX LUNG CANCER SCR C-: CPT

## 2025-07-08 ENCOUNTER — APPOINTMENT (OUTPATIENT)
Dept: PHYSICAL THERAPY | Age: 70
End: 2025-07-08
Payer: MEDICARE

## 2025-07-09 ENCOUNTER — HOSPITAL ENCOUNTER (OUTPATIENT)
Dept: PHYSICAL THERAPY | Age: 70
Setting detail: THERAPIES SERIES
Discharge: HOME | End: 2025-07-09
Payer: MEDICARE

## 2025-07-09 PROCEDURE — 97110 THERAPEUTIC EXERCISES: CPT

## 2025-07-09 PROCEDURE — 97140 MANUAL THERAPY 1/> REGIONS: CPT

## 2025-07-09 NOTE — PROGRESS NOTES
1129   Timed Code Minutes: 29 min   Total Treatment Time:  29 min       Electronically Signed by: Laura Lucas PT

## 2025-07-10 ENCOUNTER — HOSPITAL ENCOUNTER (OUTPATIENT)
Dept: PHYSICAL THERAPY | Age: 70
Setting detail: THERAPIES SERIES
Discharge: HOME | End: 2025-07-10
Payer: MEDICARE

## 2025-07-10 PROCEDURE — 97110 THERAPEUTIC EXERCISES: CPT

## 2025-07-15 ENCOUNTER — HOSPITAL ENCOUNTER (OUTPATIENT)
Dept: PHYSICAL THERAPY | Age: 70
Setting detail: THERAPIES SERIES
Discharge: HOME | End: 2025-07-15
Payer: MEDICARE

## 2025-07-15 PROCEDURE — 97110 THERAPEUTIC EXERCISES: CPT

## 2025-07-15 NOTE — THERAPY DISCHARGE
[facility]  PHYSICAL THERAPY  [] EVALUATION  [] DAILY NOTE (LAND) [] DAILY NOTE (AQUATIC ) [] PROGRESS NOTE [x] DISCHARGE NOTE    [] [facility] [place]   [x] [place] [facility]    [] [facility]   [] [facility]    Date: 7/15/2025  Patient Name:  Milton Davis  : 1955  MRN: [id number]  CSN: [id number]    Referring Practitioner Yosef Wheeler PA-C [id number]      Diagnosis  Diagnoses       M54.12 (ICD-10-CM) - Radiculopathy, cervical region    M50.322 (ICD-10-CM) - Other cervical disc degeneration at C5-C6 level    M50.323 (ICD-10-CM) - Other cervical disc degeneration at C6-C7 level    M54.2 (ICD-10-CM) - Cervicalgia           Treatment Diagnosis M54.2  Neck Pain  R29.3 Abnormal Posture  R53.1 Weakness   Date of Evaluation 25   Additional Pertinent History Milton Davis has a past medical history of Hyperlipidemia and Hypertension.  he has a past surgical history that includes back surgery; Diagnostic Cardiac Cath Lab Procedure (10/23/2020); Band hemorrhoidectomy (); Carpal tunnel release (Left, 03/10/2024); Carpal tunnel release (Left, 2024); and eye surgery (Bilateral, ).     Allergies No Known Allergies   Medications   Current Outpatient Medications:     atorvastatin (LIPITOR) 80 MG tablet, Take 1 tablet by mouth nightly, Disp: 90 tablet, Rfl: 3    lisinopril (PRINIVIL;ZESTRIL) 5 MG tablet, take 1 tablet by mouth once daily, Disp: 90 tablet, Rfl: 3    docusate sodium (COLACE) 100 MG capsule, Take 1 capsule by mouth 2 times daily While taking Norco for pain to prevent constipation, Disp: 14 capsule, Rfl: 0    Nemolizumab-ilto (NEMLUVIO SC), Inject into the skin every 30 days, Disp: , Rfl:     tamsulosin (FLOMAX) 0.4 MG capsule, Take 1 capsule by mouth daily, Disp: 90 capsule, Rfl: 3    pantoprazole (PROTONIX) 40 MG tablet, Take 1 tablet by mouth 2 times daily, Disp: , Rfl:     aspirin (RA ASPIRIN EC

## 2025-07-17 ENCOUNTER — APPOINTMENT (OUTPATIENT)
Dept: PHYSICAL THERAPY | Age: 70
End: 2025-07-17
Payer: MEDICARE

## 2025-07-26 ENCOUNTER — HOSPITAL ENCOUNTER (EMERGENCY)
Age: 70
Discharge: HOME | End: 2025-07-26
Payer: MEDICARE

## 2025-07-26 VITALS
HEART RATE: 73 BPM | HEIGHT: 70 IN | DIASTOLIC BLOOD PRESSURE: 73 MMHG | TEMPERATURE: 97.5 F | BODY MASS INDEX: 26.92 KG/M2 | OXYGEN SATURATION: 99 % | RESPIRATION RATE: 16 BRPM | SYSTOLIC BLOOD PRESSURE: 120 MMHG | WEIGHT: 188 LBS

## 2025-07-26 DIAGNOSIS — J20.9 ACUTE BRONCHITIS, UNSPECIFIED ORGANISM: Primary | ICD-10-CM

## 2025-07-26 DIAGNOSIS — J32.9 RHINOSINUSITIS: ICD-10-CM

## 2025-07-26 PROCEDURE — 99214 OFFICE O/P EST MOD 30 MIN: CPT

## 2025-07-26 PROCEDURE — 6370000000 HC RX 637 (ALT 250 FOR IP)

## 2025-07-26 PROCEDURE — 94640 AIRWAY INHALATION TREATMENT: CPT

## 2025-07-26 PROCEDURE — 99213 OFFICE O/P EST LOW 20 MIN: CPT

## 2025-07-26 RX ORDER — ALBUTEROL SULFATE AND BUDESONIDE 90; 80 UG/1; UG/1
2 AEROSOL, METERED RESPIRATORY (INHALATION) EVERY 4 HOURS PRN
COMMUNITY
Start: 2025-06-05

## 2025-07-26 RX ORDER — PREDNISONE 20 MG/1
TABLET ORAL
Qty: 15 TABLET | Refills: 0 | Status: SHIPPED | OUTPATIENT
Start: 2025-07-26 | End: 2025-08-05

## 2025-07-26 RX ORDER — BENZONATATE 200 MG/1
200 CAPSULE ORAL 3 TIMES DAILY PRN
Qty: 30 CAPSULE | Refills: 0 | Status: SHIPPED | OUTPATIENT
Start: 2025-07-26 | End: 2025-08-05

## 2025-07-26 RX ORDER — IPRATROPIUM BROMIDE AND ALBUTEROL SULFATE 2.5; .5 MG/3ML; MG/3ML
1 SOLUTION RESPIRATORY (INHALATION) ONCE
Status: COMPLETED | OUTPATIENT
Start: 2025-07-26 | End: 2025-07-26

## 2025-07-26 RX ADMIN — IPRATROPIUM BROMIDE AND ALBUTEROL SULFATE 1 DOSE: .5; 3 SOLUTION RESPIRATORY (INHALATION) at 11:29

## 2025-07-26 ASSESSMENT — ENCOUNTER SYMPTOMS
COUGH: 1
ABDOMINAL PAIN: 0
RHINORRHEA: 1
NAUSEA: 0
COLOR CHANGE: 0
DIARRHEA: 0
PHOTOPHOBIA: 0
SINUS PAIN: 0
VOMITING: 0
WHEEZING: 1
SORE THROAT: 1
CONSTIPATION: 0
CHEST TIGHTNESS: 0
EYE PAIN: 0
SINUS PRESSURE: 1
EYE REDNESS: 0

## 2025-07-26 ASSESSMENT — PAIN - FUNCTIONAL ASSESSMENT: PAIN_FUNCTIONAL_ASSESSMENT: 0-10

## 2025-07-26 ASSESSMENT — PAIN SCALES - GENERAL: PAINLEVEL_OUTOF10: 0

## 2025-07-26 ASSESSMENT — VISUAL ACUITY: OU: 1

## 2025-07-26 NOTE — DISCHARGE INSTRUCTIONS
I sent in Augmentin for you.  This is the antibiotic that can cause GI upset.  Make sure you incorporate a probiotic or a daily yogurt to help with this.  Take this medication with food.    I sent in prednisone for you.  This will help reduce inflammation and the urge to cough.     Use your airsupra as directed. Keep using your regular inhalers.     It is important for you to cough and deep breathe throughout the day to help prevent pneumonia.      I recommend using tylenol and ibuprofen on an alternating schedule for fever/pain/body aches.  This gives you more control over how much you are taking vs compound medications like Dayquil or Nyquil.      Ensure that you are staying hydrated.     I did prescribe Tessalon for you to help with coughing.  If these do not provider relief there is an OTC medication that is effective and does not interact with your medications.  You can use chlorpheniramine maleate as needed for cough at night and postnasal drip.      You can use Mucinex, I recommended without the DM.  Ensure that they are drinking extra water with this, take separately with a full glass of water.      You can use Jennifer pot or nasal rinse for symptom relief of sinus pressure.       VTE Assessment already completed for this visit

## 2025-07-26 NOTE — ED NOTES
Breathing treatment completed and discontinued. SPO2 99%.  Pt states he feels \"better\".      Laura Nieto, RN  07/26/25 8112

## 2025-07-26 NOTE — ED PROVIDER NOTES
[facility] EMERGENCY DEPARTMENT  Urgent Care Encounter       CHIEF COMPLAINT       Chief Complaint   Patient presents with    Cough     Cough x 1 week    Facial Pain     Sinus and nasal congestion       Nurses Notes reviewed and I agree except as noted in the HPI.  HISTORY OF PRESENT ILLNESS   Milton Davis is a 69 y.o. male who presents to [facility] for evaluation of cough, sinus pain, and nasal congestion.  Pt denies any fever, chills, SOB, CP, light-headedness or dizziness, numbness or tingling, abd pain, N/V/D, constipation or urinary complaints.  Reports cough has been on-going for 1 week.  He recently had a CT scan for lung cancer screening that was negative.  He reports that he typically experiences similar symptoms \"once or twice a year\" in which he typically needs steroids.  He has two inhalers.  He reports using the advair disc twice daily, but has not used his airsupra.  Reports that he can use that every 4 hours as needed.  He reports his symptoms worsened on Monday or Tuesday and have albert persistent.  Reports sinus pressure/congestion that is on-going.      The history is provided by the patient. No  was used.       REVIEW OF SYSTEMS     Review of Systems   Constitutional:  Positive for fatigue. Negative for fever.   HENT:  Positive for congestion, postnasal drip, rhinorrhea, sinus pressure and sore throat. Negative for ear pain and sinus pain.    Eyes:  Negative for photophobia, pain and redness.   Respiratory:  Positive for cough and wheezing. Negative for chest tightness.    Cardiovascular:  Negative for chest pain.   Gastrointestinal:  Negative for abdominal pain, constipation, diarrhea, nausea and vomiting.   Genitourinary:  Negative for difficulty urinating.   Skin:  Negative for color change.   Allergic/Immunologic: Positive for environmental allergies.   Neurological:  Negative for dizziness, light-headedness and headaches.   Hematological:   Negative for adenopathy. Does not bruise/bleed easily.   Psychiatric/Behavioral:  Negative for suicidal ideas.    All other systems reviewed and are negative.      PAST MEDICAL HISTORY         Diagnosis Date    Hyperlipidemia     Hypertension        SURGICALHISTORY     Patient  has a past surgical history that includes back surgery; Diagnostic Cardiac Cath Lab Procedure (10/23/2020); Band hemorrhoidectomy (2023); Carpal tunnel release (Left, 03/10/2024); Carpal tunnel release (Left, 03/24/2024); and eye surgery (Bilateral, 2024).    CURRENT MEDICATIONS       Previous Medications    ADVAIR DISKUS 250-50 MCG/DOSE AEPB        AIRSUPRA 90-80 MCG/ACT AERO    Inhale 2 puffs into the lungs every 4 hours as needed    ALBUTEROL (PROVENTIL) (2.5 MG/3ML) 0.083% NEBULIZER SOLUTION    Take 3 mLs by nebulization every 6 hours as needed for Wheezing    ASPIRIN (RA ASPIRIN EC ADULT LOW ST) 81 MG EC TABLET    take 1 tablet by mouth once daily    ATORVASTATIN (LIPITOR) 80 MG TABLET    Take 1 tablet by mouth nightly    DOCUSATE SODIUM (COLACE) 100 MG CAPSULE    Take 1 capsule by mouth 2 times daily While taking Norco for pain to prevent constipation    EZETIMIBE (ZETIA) 10 MG TABLET    Take 1 tablet by mouth daily    FLUTICASONE (FLONASE) 50 MCG/ACT NASAL SPRAY        LISINOPRIL (PRINIVIL;ZESTRIL) 5 MG TABLET    take 1 tablet by mouth once daily    NEMOLIZUMAB-ILTO (NEMLUVIO SC)    Inject into the skin every 30 days    PANTOPRAZOLE (PROTONIX) 40 MG TABLET    Take 1 tablet by mouth 2 times daily    PROAIR  (90 BASE) MCG/ACT INHALER        TAMSULOSIN (FLOMAX) 0.4 MG CAPSULE    Take 1 capsule by mouth daily       ALLERGIES     Patient is has no known allergies.    Patients   Immunization History   Administered Date(s) Administered    COVID-19, MODERNA BLUE border, Primary or Immunocompromised, (age 12y+), IM, 100 mcg/0.5mL 03/22/2021, 04/19/2021, 12/22/2021    COVID-19, MODERNA Bivalent, (age 12y+), IM, 50 mcg/0.5 mL 10/10/2022,